# Patient Record
Sex: FEMALE | Race: WHITE | NOT HISPANIC OR LATINO | Employment: UNEMPLOYED | URBAN - METROPOLITAN AREA
[De-identification: names, ages, dates, MRNs, and addresses within clinical notes are randomized per-mention and may not be internally consistent; named-entity substitution may affect disease eponyms.]

---

## 2018-01-11 ENCOUNTER — ALLIED HEALTH/NURSE VISIT (OUTPATIENT)
Dept: FAMILY MEDICINE | Facility: OTHER | Age: 28
End: 2018-01-11
Payer: COMMERCIAL

## 2018-01-11 VITALS — WEIGHT: 179 LBS | BODY MASS INDEX: 31.71 KG/M2

## 2018-01-11 DIAGNOSIS — Z32.01 PREGNANCY TEST POSITIVE: ICD-10-CM

## 2018-01-11 DIAGNOSIS — Z32.00 POSSIBLE PREGNANCY, NOT YET CONFIRMED: Primary | ICD-10-CM

## 2018-01-11 LAB
B-HCG SERPL-ACNC: ABNORMAL IU/L (ref 0–5)
BETA HCG QUAL IFA URINE: POSITIVE

## 2018-01-11 PROCEDURE — 84703 CHORIONIC GONADOTROPIN ASSAY: CPT | Performed by: FAMILY MEDICINE

## 2018-01-11 PROCEDURE — 36415 COLL VENOUS BLD VENIPUNCTURE: CPT | Performed by: FAMILY MEDICINE

## 2018-01-11 PROCEDURE — 84702 CHORIONIC GONADOTROPIN TEST: CPT | Performed by: FAMILY MEDICINE

## 2018-01-11 NOTE — MR AVS SNAPSHOT
After Visit Summary   1/11/2018    Mary Mckeon    MRN: [id number]           Patient Information     Date Of Birth          1990        Visit Information        Provider Department      1/11/2018 4:00 PM NL RN TEAM A, [facility]        Today's Diagnoses     Possible pregnancy, not yet confirmed    -  1    Pregnancy test positive           Follow-ups after your visit        Your next 10 appointments already scheduled     Feb 08, 2018  4:00 PM CST   [facility] with ISE[facility] CN[facility]   [facility]    [address] [facility] [id number] [phone]              Who to contact     If you have questions or need follow up information about today's clinic visit or your schedule please contact [facility] directly at [phone].  Normal or non-critical lab and imaging results will be communicated to you by MyChart, letter or phone within 4 business days after the clinic has received the results. If you do not hear from us within 7 days, please contact the clinic through MyChart or phone. If you have a critical or abnormal lab result, we will notify you by phone as soon as possible.  Submit refill requests through PacerPro or call your pharmacy and they will forward the refill request to us. Please allow 3 business days for your refill to be completed.          Additional Information About Your Visit        MyChart Information     PacerPro gives you secure access to your electronic health record. If you see a primary care provider, you can also send messages to your care team and make appointments. If you have questions, please call your primary care clinic.  If you do not have a primary care provider, please call [phone] and they will assist you.        Care EveryWhere ID     This is your Care EveryWhere ID. This could be used by other organizations to access your [facility] medical records  [id number]         Your Vitals Were     Last Period Breastfeeding? BMI (Body Mass Index)             11/27/2017 (Exact Date) No 31.71 kg/m2          Blood Pressure from Last 3 Encounters:   07/22/16 114/60   06/30/16 94/60   06/03/16 114/66    Weight from Last 3 Encounters:   01/11/18 179 lb (81.2 kg)   07/22/16 207 lb (93.9 kg)   06/30/16 203 lb 12 oz (92.4 kg)              We Performed the Following     Beta HCG qual IFA urine     HCG quantitative pregnancy        Primary Care Provider Fax #    Physician No Ref-Primary [phone]       No address on file        Equal Access to Services     ANAHI DIAZ : Hadii sylvester Johnson, kinsey shultz, sarkis espino, garrick perrin . So [facility] [phone].    ATENCIÓN: Si habla español, tiene a turpin disposición servicios gratuitos de asistencia lingüística. Llame al [phone].    We comply with applicable federal civil rights laws and [place] laws. We do not discriminate on the basis of race, color, national origin, age, disability, sex, sexual orientation, or gender identity.            Thank you!     Thank you for choosing [facility]  for your care. Our goal is always to provide you with excellent care. Hearing back from our patients is one way we can continue to improve our services. Please take a few minutes to complete the written survey that you may receive in the mail after your visit with us. Thank you!             Your Updated Medication List - Protect others around you: Learn how to safely use, store and throw away your medicines at www.disposemymeds.org.          This list is accurate as of: 1/11/18  4:24 PM.  Always use your most recent med list.                   Brand Name Dispense Instructions for use Diagnosis    albuterol 108 (90 BASE) MCG/ACT Inhaler    PROAIR HFA/PROVENTIL HFA/VENTOLIN HFA    1 Inhaler    Inhale 2 puffs into the lungs every 6 hours as needed for shortness of breath / dyspnea     Intermittent asthma       cephALEXin 500 MG capsule    KEFLEX    40 capsule    Take 1 capsule (500 mg) by mouth 4 times daily    Cellulitis of abdominal wall       FLOVENT  MCG/ACT Inhaler   Generic drug:  fluticasone     3 Inhaler    INHALE 2 PUFFS INTO THE LUNGS 2 TIMES DAILY    Intermittent asthma       IRON FORMULA PO      Take 325 mg by mouth 2 times daily        norethindrone 0.35 MG per tablet    MICRONOR    84 tablet    Take 1 tablet (0.35 mg) by mouth daily    Encounter for initial prescription of contraceptive pills       PRENATAL 1 PO       Pregnancy examination or test, pregnancy unconfirmed

## 2018-01-11 NOTE — PROGRESS NOTES
Mary Mckeon is a 27 year old here today for a pregnancy test.  LMP: Patient's last menstrual period was 2017 (exact date).  Wt: 179 lbs 0 oz.    Symptoms include breast tenderness, absence of menses, nausea, and fatigue.    Mary informed of positive pregnancy test results. FABRICIO: 9/3/2018    Educational advice given: nutrition, smoking and drugs & alcohol.    Current medications reviewed: Yes    Previous pregnancy history remarkable for: none.    Plan: follow-up appointment with Kandy Lux CNM, for pre-aleyda care, take multivitamin or pre-aleyda vitamins and OB Education packet given.    She is to call back if she has any questions or concerns.  She is advised to notify a provider immediately if she experiences any severe cramping or abdominal pain or any vaginal bleeding.    Nayla Vazquez RN

## 2018-01-22 ENCOUNTER — RADIANT APPOINTMENT (OUTPATIENT)
Dept: ULTRASOUND IMAGING | Facility: OTHER | Age: 28
End: 2018-01-22
Attending: ADVANCED PRACTICE MIDWIFE
Payer: COMMERCIAL

## 2018-01-22 DIAGNOSIS — Z32.01 PREGNANCY TEST POSITIVE: ICD-10-CM

## 2018-01-22 PROCEDURE — 76801 OB US < 14 WKS SINGLE FETUS: CPT

## 2018-02-10 ENCOUNTER — NURSE TRIAGE (OUTPATIENT)
Dept: NURSING | Facility: CLINIC | Age: 28
End: 2018-02-10

## 2018-02-11 NOTE — TELEPHONE ENCOUNTER
"Pt called from [place] so unable to triage. Pt states she is 12 wks pregnant and \"I am just miscarried my baby\"  She had moderate bleeding and passed \"the entire fetus\". No severe abdominal pain. No lightheadedness. No heavy bleeding. Advised wear regular sanitary pad. If saturating pad in 2 hrs or less she should seek care at nearest hospital ED. If feeling lightheaded/dizzy or very weak or if constant abd pain, severe cramping, fever, feeling ill in general or other sx she is not sure about, seek care at nearest hospital ED. Pt voiced understanding. Advised she may call back if she has questions we can try to answer or we can try on-call. Cary Hollins RN/FNA    "

## 2018-02-20 ENCOUNTER — TELEPHONE (OUTPATIENT)
Dept: OBGYN | Facility: OTHER | Age: 28
End: 2018-02-20

## 2018-02-20 NOTE — TELEPHONE ENCOUNTER
LM for the patient to return call to the clinic to discuss the below. Will await to hear from patient. Ana Brwoning RN, BSN

## 2018-02-20 NOTE — TELEPHONE ENCOUNTER
Reason for Call:  Other question    Detailed comments: patient states she had a miscarriage a week ago Saturday and she wants to know if she needs to come in and see you? Patient has an appointment on 02/26/2018 for her 1 st ob should she keep it and see you for the miscarriage?    Phone Number Patient can be reached at: Home number on file [phone] (home)    Best Time: anytime    Can we leave a detailed message on this number? YES    Call taken on 2/20/2018 at 8:38 AM by Brooklyn Baker

## 2018-02-20 NOTE — TELEPHONE ENCOUNTER
"Mary Mckeon is a 27 year old female who calls with miscarriage.    NURSING ASSESSMENT:  Description:  Last Saturday when she was on her way to [place] started to have vaginal bleeding and when she went to the bathroom \"saw it in the toilet.\"  Believes she was around 11 weeks along when this occurred. Denies vaginal bleeding at this time, vaginal discomfort, discharge, odor, itching, abdominal pain, fevers.   Onset/duration:  Last Saturday   Precip. factors:  Newly pregnant followed by vaginal bleeding   Associated symptoms:  None at this time  Improves/worsens symptoms:  Improved   Pain scale (0-10)   0/10  LMP/preg/breast feedin/10/2018  Last exam/Treatment:  2016  Allergies: No Known Allergies    NURSING PLAN: Nursing advice to patient to keep scheduled OV RN changed  Reason for visit and type    RECOMMENDED DISPOSITION:  Home care advice   Will comply with recommendation: Yes  If further questions/concerns or if symptoms do not improve, worsen or new symptoms develop, call your PCP or [facility] as soon as possible.    NOTES:  Disposition was determined by the first positive assessment question, therefore all previous assessment questions were negative    Guideline used:  Telephone Triage Protocols for Nurses, Fifth Edition, Lizy Del Rio  Vaginal bleeding  Nursing judgment    Ana Browning, RN, BSN         "

## 2018-02-20 NOTE — TELEPHONE ENCOUNTER
LM for the patient to return call to the clinic to discuss the below. Will await to hear from patient. Ana Browning RN, BSN

## 2018-02-23 ENCOUNTER — VIRTUAL VISIT (OUTPATIENT)
Dept: FAMILY MEDICINE | Facility: OTHER | Age: 28
End: 2018-02-23

## 2018-02-23 NOTE — PROGRESS NOTES
"Date:   Clinician: Broderick Monterroso  Clinician NPI: [id number]  Patient: Mary Torres  Patient : 1990  Patient Address: [address] [place] [id number]  Patient Phone: [phone]  Visit Protocol: UTI  Patient Summary:  Mary is a 27 year old ( : 1990 ) female who initiated a Visit for a presumed bladder infection. When asked the question \"Please sign me up to receive news, health information and promotions. \", Mary responded \"No\".    Her symptoms began 2 days ago and consist of hesitation, urgency, foul smelling urine, urinary frequency, and dysuria.   Symptom Details   Urinary Frequency: Every hour    She denies loss of appetite, chills, vaginal discharge, flank pain, feeling feverish, nausea, recent antibiotic use, abdominal pain, vomiting, hematuria, and urinary incontinence. Mary has never had kidney stones. She has not been hospitalized, been a patient in a nursing home, or had a catheter in the past two weeks. She denies risk factors for sexually transmitted infections.   Mary has not had any UTIs in the past 12 months. Her current symptoms are similar to the previous UTI symptoms. She took TMP/Sulfa for her last infection and found it to be effective.   Mary does not get yeast infections when she takes antibiotics.   She denies pregnancy and denies breastfeeding. Her last period was over a month ago.   She does NOT smoke or use smokeless tobacco.  MEDICATIONS:  No current medications , ALLERGIES:  NKDA   Clinician Response:  Dear Mary,  Based on the information you have provided, you likely have a bladder infection, also called an acute urinary tract infection (UTI).   To treat your infection, I am prescribing:   Nitrofurantoin (Macrobid). Swallow one (1) tablet twice a day for 5 days. Take the tablet with food. Continue taking the tablets even if you feel better before all the medication is gone. There is no refill with this prescription.   Antibiotic " selections by the provider are based on safety and effectiveness. You may or may not be prescribed the same medication that you took for your last bladder infection.   Some people develop allergies to antibiotics. If you notice a new rash, significant swelling, or difficulty breathing, stop the medication immediately and go into a clinic for physical evaluation.   To help treat your current UTI and prevent future occurrences, remember to:     Drink 8-10, 8-ounce glasses of water daily.    Urinate after sexual intercourse.    Wipe front to back after using the bathroom.     Some women may develop a yeast infection as a side effect of taking antibiotics. If you notice symptoms of a yeast infection, [facility] can help treat that condition as well. Simply log in and complete another Visit, which will cover all of the necessary questions to determine the best treatment for you.   You should visit a clinic for a follow-up visit if your symptoms do not improve in 1-2 days or if you experience another urinary tract infection soon after completing this treatment.  If you become pregnant during this course of treatment, stop taking the medication and contact your primary care provider.   Diagnosis: Acute Uncomplicated Bladder Infection  Diagnosis ICD: N39.0  Prescription: nitrofurantoin (Macrobid) 100mg oral tablet 10 tablets, 5 days supply. Take one tablet by mouth two times a day for 5 days. Refills: 0, Refill as needed: no, Allow substitutions: yes  Pharmacy: [id number] - [phone] - [address], [place] [address]

## 2018-02-26 ENCOUNTER — PRENATAL OFFICE VISIT (OUTPATIENT)
Dept: OBGYN | Facility: OTHER | Age: 28
End: 2018-02-26
Payer: COMMERCIAL

## 2018-02-26 ENCOUNTER — MYC MEDICAL ADVICE (OUTPATIENT)
Dept: OBGYN | Facility: OTHER | Age: 28
End: 2018-02-26

## 2018-02-26 ENCOUNTER — RADIANT APPOINTMENT (OUTPATIENT)
Dept: ULTRASOUND IMAGING | Facility: CLINIC | Age: 28
End: 2018-02-26
Attending: OBSTETRICS & GYNECOLOGY
Payer: COMMERCIAL

## 2018-02-26 VITALS
WEIGHT: 182 LBS | SYSTOLIC BLOOD PRESSURE: 116 MMHG | BODY MASS INDEX: 32.24 KG/M2 | DIASTOLIC BLOOD PRESSURE: 60 MMHG | HEART RATE: 104 BPM

## 2018-02-26 DIAGNOSIS — O03.9 MISCARRIAGE: Primary | ICD-10-CM

## 2018-02-26 DIAGNOSIS — B96.89 BV (BACTERIAL VAGINOSIS): ICD-10-CM

## 2018-02-26 DIAGNOSIS — N89.8 VAGINAL DISCHARGE: ICD-10-CM

## 2018-02-26 DIAGNOSIS — O03.9 MISCARRIAGE: ICD-10-CM

## 2018-02-26 DIAGNOSIS — N76.0 BV (BACTERIAL VAGINOSIS): ICD-10-CM

## 2018-02-26 LAB
B-HCG SERPL-ACNC: ABNORMAL IU/L (ref 0–5)
SPECIMEN SOURCE: ABNORMAL
WET PREP SPEC: ABNORMAL

## 2018-02-26 PROCEDURE — 84702 CHORIONIC GONADOTROPIN TEST: CPT | Performed by: OBSTETRICS & GYNECOLOGY

## 2018-02-26 PROCEDURE — 76816 OB US FOLLOW-UP PER FETUS: CPT | Performed by: RADIOLOGY

## 2018-02-26 PROCEDURE — 36415 COLL VENOUS BLD VENIPUNCTURE: CPT | Performed by: OBSTETRICS & GYNECOLOGY

## 2018-02-26 PROCEDURE — 99214 OFFICE O/P EST MOD 30 MIN: CPT | Performed by: OBSTETRICS & GYNECOLOGY

## 2018-02-26 PROCEDURE — 87210 SMEAR WET MOUNT SALINE/INK: CPT | Performed by: OBSTETRICS & GYNECOLOGY

## 2018-02-26 RX ORDER — METRONIDAZOLE 500 MG/1
500 TABLET ORAL 2 TIMES DAILY
Qty: 14 TABLET | Refills: 0 | Status: SHIPPED | OUTPATIENT
Start: 2018-02-26 | End: 2018-03-09

## 2018-02-26 ASSESSMENT — PAIN SCALES - GENERAL: PAINLEVEL: NO PAIN (0)

## 2018-02-26 NOTE — MR AVS SNAPSHOT
After Visit Summary   2/26/2018    Mary Torres    MRN: [id number]           Patient Information     Date Of Birth          1990        Visit Information        Provider Department      2/26/2018 8:00 AM Jailyn Palacios DO [facility]        Today's Diagnoses     Miscarriage    -  1    Vaginal discharge          Care Instructions    Please call if you any questions.    [facility]  [address] [place]   [address]  [phone]        Jailyn Palacios            Follow-ups after your visit        Follow-up notes from your care team     Return in about 2 weeks (around 3/12/2018).      Future tests that were ordered for you today     Open Standing Orders        Priority Remaining Interval Expires Ordered    HCG quantitative pregnancy Routine 4/5  3/16/2018 2/26/2018          Open Future Orders        Priority Expected Expires Ordered    US OB Single Follow Up Repeat Routine  5/27/2018 2/26/2018            Who to contact     If you have questions or need follow up information about today's clinic visit or your schedule please contact [facility] directly at [phone].  Normal or non-critical lab and imaging results will be communicated to you by TextRecruithart, letter or phone within 4 business days after the clinic has received the results. If you do not hear from us within 7 days, please contact the clinic through TextRecruithart or phone. If you have a critical or abnormal lab result, we will notify you by phone as soon as possible.  Submit refill requests through Taste Guru or call your pharmacy and they will forward the refill request to us. Please allow 3 business days for your refill to be completed.          Additional Information About Your Visit        MyChart Information     Taste Guru gives you secure access to your electronic health record. If you see a primary care provider, you can also send messages to your care team and make appointments. If you  have questions, please call your primary care clinic.  If you do not have a primary care provider, please call [phone] and they will assist you.        Care EveryWhere ID     This is your Care EveryWhere ID. This could be used by other organizations to access your [facility] medical records  [id number]        Your Vitals Were     Pulse Last Period Breastfeeding? BMI (Body Mass Index)          104 11/27/2017 (Exact Date) Unknown 32.24 kg/m2         Blood Pressure from Last 3 Encounters:   02/26/18 116/60   07/22/16 114/60   06/30/16 94/60    Weight from Last 3 Encounters:   02/26/18 182 lb (82.6 kg)   01/11/18 179 lb (81.2 kg)   07/22/16 207 lb (93.9 kg)              We Performed the Following     HCG quantitative pregnancy     Wet prep        Primary Care Provider Fax #    Physician No Ref-Primary [phone]       No address on file        Equal Access to Services     ANAHI DIAZ : Hadii sylvester martinio Alex, waaxda luqadaha, qaybta kaalmada ademargaret, garrick perrin . So [facility] [phone].    ATENCIÓN: Si habla español, tiene a turpin disposición servicios gratuitos de asistencia lingüística. Tracie al [phone].    We comply with applicable federal civil rights laws and [place] laws. We do not discriminate on the basis of race, color, national origin, age, disability, sex, sexual orientation, or gender identity.            Thank you!     Thank you for choosing [facility]  for your care. Our goal is always to provide you with excellent care. Hearing back from our patients is one way we can continue to improve our services. Please take a few minutes to complete the written survey that you may receive in the mail after your visit with us. Thank you!             Your Updated Medication List - Protect others around you: Learn how to safely use, store and throw away your medicines at www.disposemymeds.org.          This list is accurate as of 2/26/18  8:33 AM.  Always  use your most recent med list.                   Brand Name Dispense Instructions for use Diagnosis    albuterol 108 (90 BASE) MCG/ACT Inhaler    PROAIR HFA/PROVENTIL HFA/VENTOLIN HFA    1 Inhaler    Inhale 2 puffs into the lungs every 6 hours as needed for shortness of breath / dyspnea    Intermittent asthma       FLOVENT  MCG/ACT Inhaler   Generic drug:  fluticasone     3 Inhaler    INHALE 2 PUFFS INTO THE LUNGS 2 TIMES DAILY    Intermittent asthma       MACROBID PO      Take by mouth 2 times daily (with meals)        PRENATAL 1 PO       Pregnancy examination or test, pregnancy unconfirmed

## 2018-02-26 NOTE — PROGRESS NOTES
"Subjective  27 year old pregnant female at 13 6/7 weeks based on 8 week ultrasound presents today complaining of a likely MAB.  Patient was flying to [place] on 2/10 and felt a gush of blood.  She went to the bathroom and the blood had soaked through her underwear and jeans.  She went to the bathroom later that day and saw a \"fetus\" in it.  She is not having anymore vaginal bleeding.  She had very minimal vaginal bleeding after passing the tissue she says.  No cramping.  No fever or chills.  Patient has had unprotected since then.  She was seen early January and had a quant done then which was 30,050.  O+.  We discussed getting a quant and ultrasound today.  Reviewed that miscarriage occurs ~ 1 in 5 pregnancy events and that there was no direct event or prevention that the patient could have avoided or performed.  Discussed that there are many etiologies for miscarriage, the most common being a genetic anomaly that occurs within the pregnancy itself.  Reviewed that risk of miscarriage for next pregnancy is not elevated by the current event.    Reviewed options of expectant management, D&C, and medical therapy (cytotec).  Reviewed risks and benefits of all options.  All questions answered.  Patient is opting for expectant management for now until the results are back.             ROS: 10 point ROS neg other than the symptoms noted above in the HPI.  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 No family hx of      Hypertension No family hx of      CEREBROVASCULAR DISEASE No family hx of      Breast Cancer No family hx of      Cancer " - colorectal No family hx of      Prostate Cancer No family hx of      Alcohol/Drug No family hx of      Allergies No family hx of      Alzheimer Disease No family hx of      Anesthesia Reaction No family hx of      Arthritis No family hx of      Blood Disease No family hx of      Cardiovascular No family hx of      Circulatory No family hx of      Congenital Anomalies No family hx of      Connective Tissue Disorder No family hx of      Depression No family hx of      Endocrine Disease No family hx of      Eye Disorder No family hx of      Genetic Disorder No family hx of      GASTROINTESTINAL DISEASE No family hx of      Genitourinary Problems No family hx of      Gynecology No family hx of      HEART DISEASE No family hx of      Lipids No family hx of      Musculoskeletal Disorder No family hx of      Neurologic Disorder No family hx of      Obesity No family hx of      OSTEOPOROSIS No family hx of      Psychotic Disorder No family hx of      Respiratory No family hx of      Family History Negative No family hx of      Hearing Loss No family hx of      Social History   Substance Use Topics     Smoking status: Never Smoker     Smokeless tobacco: Never Used     Alcohol use No         Objective  Vitals: /60  Pulse 104  Wt 182 lb (82.6 kg)  LMP 11/27/2017 (Exact Date)  Breastfeeding? Unknown  BMI 32.24 kg/m2  BMI= Body mass index is 32.24 kg/(m^2).    General appearance=well developed, well-nourished female  Psych=mood is stable  PELVIC:    External genitalia: normal without lesions or masses  Urethral meatus: no lesions or prolapse noted, normal size  Urethra: no masses, non tender  Bladder: non tender, no fullness  Vagina: normal mucosa and rugae, moderate amount of clear discharge, no old blood or active bleeding, wet prep obatained  Cervix: normal without lesion, no cervical motion tenderness, healthy, multiparous  Uterus: small, mobile, nontender.  Adnexa: non tender, without masses  Rectal:  deffered  Ext=no clubbing or cyanosis, no swelling      Assessment  1.)  Likely MAB  2.)  Vaginal discharge       Plan  1.)  Quant and ultrasound  2.)  Wet prep      25 minutes was spent face to face with the patient today discussing her history, diagnosis, and follow-up plan as noted above.  Over 50% of the visit was spent in counseling and coordination of care.        Nursing notes read and reviewed    Jailyn Palacios

## 2018-02-26 NOTE — PATIENT INSTRUCTIONS
Please call if you any questions.    [address]   [address]  [phone]        Jailyn Palacios

## 2018-02-26 NOTE — TELEPHONE ENCOUNTER
I called and spoke to patient.  All questions answered and patient verbalizes understanding.    Jailyn Palacios

## 2018-02-26 NOTE — NURSING NOTE
"Chief Complaint   Patient presents with     Miscarriage       Initial /60  Pulse 104  Wt 182 lb (82.6 kg)  LMP 11/27/2017 (Exact Date)  Breastfeeding? Unknown  BMI 32.24 kg/m2 Estimated body mass index is 32.24 kg/(m^2) as calculated from the following:    Height as of 11/24/15: 5' 3\" (1.6 m).    Weight as of this encounter: 182 lb (82.6 kg).  Medication Reconciliation: complete  "

## 2018-03-06 ENCOUNTER — MYC MEDICAL ADVICE (OUTPATIENT)
Dept: OBGYN | Facility: OTHER | Age: 28
End: 2018-03-06

## 2018-03-07 NOTE — TELEPHONE ENCOUNTER
Routing to  OB/GYN Provider pool to review and advise if willing to do a phone visit with patient for possible yeast infection after antibiotic use (prescribed by GG) and is pregnant. Please review and advise. Ana Browning RN, BSN

## 2018-03-07 NOTE — TELEPHONE ENCOUNTER
I believe Dr. Palacios may be only one of the OB providers that does phone visits. I'm happy to do E-visit, but as patient stated it may not let her due to pregnancy. Could then either do office visit for wet prep, lab visit for self collect wet prep-I would be ok putting in orders for her or could do 7 day OTC Monistat. As she is pregnant, this is likely what would be prescribed over Diflucan anyway. Bhargavi WEISS CNP

## 2018-03-08 NOTE — TELEPHONE ENCOUNTER
Patient needs to be seen in the office for a wet prep.  I can work her into my schedule.  Thanks.    Jailyn Palacios

## 2018-03-09 ENCOUNTER — PRENATAL OFFICE VISIT (OUTPATIENT)
Dept: OBGYN | Facility: CLINIC | Age: 28
End: 2018-03-09
Payer: COMMERCIAL

## 2018-03-09 VITALS
BODY MASS INDEX: 32.06 KG/M2 | HEART RATE: 82 BPM | WEIGHT: 181 LBS | SYSTOLIC BLOOD PRESSURE: 116 MMHG | DIASTOLIC BLOOD PRESSURE: 71 MMHG

## 2018-03-09 DIAGNOSIS — Z98.891 H/O: C-SECTION: Chronic | ICD-10-CM

## 2018-03-09 DIAGNOSIS — Z23 NEED FOR TDAP VACCINATION: ICD-10-CM

## 2018-03-09 DIAGNOSIS — Z23 NEED FOR PROPHYLACTIC VACCINATION AND INOCULATION AGAINST INFLUENZA: ICD-10-CM

## 2018-03-09 DIAGNOSIS — J45.20 MILD INTERMITTENT ASTHMA, UNSPECIFIED WHETHER COMPLICATED: ICD-10-CM

## 2018-03-09 DIAGNOSIS — Z34.92 NORMAL PREGNANCY IN SECOND TRIMESTER: Primary | ICD-10-CM

## 2018-03-09 LAB
ABO + RH BLD: NORMAL
ABO + RH BLD: NORMAL
ALBUMIN UR-MCNC: NEGATIVE MG/DL
APPEARANCE UR: CLEAR
BASOPHILS # BLD AUTO: 0 10E9/L (ref 0–0.2)
BASOPHILS NFR BLD AUTO: 0.1 %
BILIRUB UR QL STRIP: NEGATIVE
BLD GP AB SCN SERPL QL: NORMAL
BLOOD BANK CMNT PATIENT-IMP: NORMAL
COLOR UR AUTO: YELLOW
DIFFERENTIAL METHOD BLD: NORMAL
EOSINOPHIL # BLD AUTO: 0.1 10E9/L (ref 0–0.7)
EOSINOPHIL NFR BLD AUTO: 1.8 %
ERYTHROCYTE [DISTWIDTH] IN BLOOD BY AUTOMATED COUNT: 13.1 % (ref 10–15)
GLUCOSE UR STRIP-MCNC: NEGATIVE MG/DL
HBV SURFACE AG SERPL QL IA: NONREACTIVE
HCT VFR BLD AUTO: 35.4 % (ref 35–47)
HGB BLD-MCNC: 12.1 G/DL (ref 11.7–15.7)
HGB UR QL STRIP: ABNORMAL
HIV 1+2 AB+HIV1 P24 AG SERPL QL IA: NONREACTIVE
KETONES UR STRIP-MCNC: NEGATIVE MG/DL
LEUKOCYTE ESTERASE UR QL STRIP: NEGATIVE
LYMPHOCYTES # BLD AUTO: 1.9 10E9/L (ref 0.8–5.3)
LYMPHOCYTES NFR BLD AUTO: 24.5 %
MCH RBC QN AUTO: 29.9 PG (ref 26.5–33)
MCHC RBC AUTO-ENTMCNC: 34.2 G/DL (ref 31.5–36.5)
MCV RBC AUTO: 87 FL (ref 78–100)
MONOCYTES # BLD AUTO: 0.6 10E9/L (ref 0–1.3)
MONOCYTES NFR BLD AUTO: 7.2 %
NEUTROPHILS # BLD AUTO: 5.2 10E9/L (ref 1.6–8.3)
NEUTROPHILS NFR BLD AUTO: 66.4 %
NITRATE UR QL: NEGATIVE
NON-SQ EPI CELLS #/AREA URNS LPF: ABNORMAL /LPF
PH UR STRIP: 8.5 PH (ref 5–7)
PLATELET # BLD AUTO: 241 10E9/L (ref 150–450)
RBC # BLD AUTO: 4.05 10E12/L (ref 3.8–5.2)
RBC #/AREA URNS AUTO: ABNORMAL /HPF
SOURCE: ABNORMAL
SP GR UR STRIP: 1.01 (ref 1–1.03)
SPECIMEN EXP DATE BLD: NORMAL
SPECIMEN SOURCE: NORMAL
UROBILINOGEN UR STRIP-ACNC: 0.2 EU/DL (ref 0.2–1)
WBC # BLD AUTO: 7.8 10E9/L (ref 4–11)
WBC #/AREA URNS AUTO: ABNORMAL /HPF
WET PREP SPEC: NORMAL

## 2018-03-09 PROCEDURE — 87591 N.GONORRHOEAE DNA AMP PROB: CPT | Performed by: OBSTETRICS & GYNECOLOGY

## 2018-03-09 PROCEDURE — 90471 IMMUNIZATION ADMIN: CPT | Performed by: OBSTETRICS & GYNECOLOGY

## 2018-03-09 PROCEDURE — 87491 CHLMYD TRACH DNA AMP PROBE: CPT | Performed by: OBSTETRICS & GYNECOLOGY

## 2018-03-09 PROCEDURE — 99207 ZZC FIRST OB VISIT: CPT | Performed by: OBSTETRICS & GYNECOLOGY

## 2018-03-09 PROCEDURE — 87389 HIV-1 AG W/HIV-1&-2 AB AG IA: CPT | Performed by: OBSTETRICS & GYNECOLOGY

## 2018-03-09 PROCEDURE — 86900 BLOOD TYPING SEROLOGIC ABO: CPT | Performed by: OBSTETRICS & GYNECOLOGY

## 2018-03-09 PROCEDURE — 87210 SMEAR WET MOUNT SALINE/INK: CPT | Performed by: OBSTETRICS & GYNECOLOGY

## 2018-03-09 PROCEDURE — 86901 BLOOD TYPING SEROLOGIC RH(D): CPT | Performed by: OBSTETRICS & GYNECOLOGY

## 2018-03-09 PROCEDURE — 85025 COMPLETE CBC W/AUTO DIFF WBC: CPT | Performed by: OBSTETRICS & GYNECOLOGY

## 2018-03-09 PROCEDURE — 36415 COLL VENOUS BLD VENIPUNCTURE: CPT | Performed by: OBSTETRICS & GYNECOLOGY

## 2018-03-09 PROCEDURE — 86780 TREPONEMA PALLIDUM: CPT | Performed by: OBSTETRICS & GYNECOLOGY

## 2018-03-09 PROCEDURE — 86762 RUBELLA ANTIBODY: CPT | Performed by: OBSTETRICS & GYNECOLOGY

## 2018-03-09 PROCEDURE — 81001 URINALYSIS AUTO W/SCOPE: CPT | Performed by: OBSTETRICS & GYNECOLOGY

## 2018-03-09 PROCEDURE — 90686 IIV4 VACC NO PRSV 0.5 ML IM: CPT | Performed by: OBSTETRICS & GYNECOLOGY

## 2018-03-09 PROCEDURE — 87340 HEPATITIS B SURFACE AG IA: CPT | Performed by: OBSTETRICS & GYNECOLOGY

## 2018-03-09 PROCEDURE — 86850 RBC ANTIBODY SCREEN: CPT | Performed by: OBSTETRICS & GYNECOLOGY

## 2018-03-09 ASSESSMENT — PAIN SCALES - GENERAL: PAINLEVEL: NO PAIN (0)

## 2018-03-09 NOTE — MR AVS SNAPSHOT
After Visit Summary   3/9/2018    Mary Torres    MRN: [id number]           Patient Information     Date Of Birth          1990        Visit Information        Provider Department      3/9/2018 8:15 AM Jailyn Palacios,  [facility], [place]        Today's Diagnoses     Normal pregnancy in second trimester    -  1    H/O:         Need for Tdap vaccination        Mild intermittent asthma, unspecified whether complicated        Need for prophylactic vaccination and inoculation against influenza          Care Instructions    Prenatal Care  What is prenatal care?   Prenatal care is the care you receive when you are pregnant. It includes care given by your healthcare provider, support from your family, and an extra focus on giving yourself the care you need during this special time. Prenatal care improves your chances for a healthy pregnancy and healthy baby.   When should I see my healthcare provider?   Good care during pregnancy includes regularly scheduled prenatal exams.   If you are not yet pregnant but planning to get pregnant in the next few months, see your provider. Your provider may do some tests and talk about things you can do to have a healthy pregnancy and healthy baby.   You should schedule your first prenatal visit with your provider as soon as you think or know you are pregnant. Depending on your health and health history, your provider will probably schedule visits at least once a month for the first 6 months. During the 7th and 8th months you will see your provider every 2 weeks. During the last month you will probably see your provider once a week until you deliver your baby. If your pregnancy is high risk, your provider will probably want to see you more often. In some cases your provider may refer you to a medical specialist for more help with special needs, such as diabetes.   At each visit your healthcare provider will check to make sure that you and the  baby are healthy. Regular visits can help you and your provider prevent possible problems. They can also help your provider find and treat any problems early. In addition to meeting your medical needs, your provider advise you about caring for yourself. You will talk about how to have a healthy diet and get plenty of exercise and rest. Your provider can also help you deal with the emotional changes that can happen during pregnancy.   What will happen at the first prenatal visit?   At your first visit, your provider will ask about your personal medical history. He or she will also ask about the baby's father and your family health history. This information can help give your provider an idea of any problems you might have during your pregnancy. You will have a physical exam, including checks of your height, weight, and blood pressure and a pelvic exam. You will have a Pap test, urine tests, blood tests, and cultures of the cervix and vagina to check for infection.   Your provider will calculate your due date and the age of your baby. If your periods were regular before you got pregnant, and you are sure of the day when your last period started, your due date will be estimated to be 40 weeks from that day.   Your provider will talk to you about how to stay healthy during your pregnancy.   What will happen at other prenatal visits?   Your provider will check how you are doing and how the baby is developing. He or she will discuss how you are feeling, ask if you have any problems, and answer your questions.   During each prenatal visit your provider will:   weigh you   take your blood pressure   check your urine for sugar, protein, or bacteria   check your face, hands, ankles, and feet for swelling   listen to the baby's heartbeat   measure the size of the uterus to check the baby's growth.   At different times during the pregnancy, other exams and tests may be done. Some are routine and others are done only when a  problem is suspected or you have a risk factor for a problem. Examples of other tests you might have are:   tests to check for genetic problems and some birth defects, such as:   chorionic villus sampling of cells from the placenta   amniocentesis to test the fluid around the baby   blood tests called triple or quad screens   ultrasound scans to check the baby's growth, development, and health and to look at your uterus, the amniotic sac, and the placenta   blood tests to check for diabetes   electronic monitoring to check the health of the baby.   How can I take care of myself during my pregnancy?   Here are some things you can do to take good care of yourself during your pregnancy and prepare for the birth of your child:   Keep all appointments with your healthcare provider. Use these visits to discuss your pregnancy concerns or problems. Write down questions before each visit so that you will not forget about things you want to talk about.   Eat healthy meals that include whole grains, fresh fruits and vegetables, and calcium-rich foods, such as milk, cheese, and yogurt. Choose foods low in saturated fat. Do not eat uncooked or undercooked meats or fish.   Avoid certain fish with high levels of mercury. These fish include shark, kyle mackerel, swordfish, and tilefish. Do not eat more than 12 ounces of fish per week, or no more than 6 ounces of tuna fish per week. Because albacore tuna fish is also high in mercury, choose light tuna.   Drink plenty of water each day.   Take vitamins, other supplements, and medicines as recommended by your provider.   Unless your healthcare provider tells you not to, try to be physically active for at least 30 minutes a day, most days of the week. If you are pressed for time, you might find it easier to exercise 10 minutes at a time, 3 times a day. Consider taking a prenatal exercise class.   Do not smoke, do not drink alcohol, and do not take illegal drugs.   Talk to your  healthcare provider before you take any medicine, including nonprescription and herbal medicines. Some medicines are not safe during pregnancy.   Avoid hot tubs or saunas.   If you have cats in your home, do not empty the cat litter while you are pregnant. It may contain a parasite that causes an infection called toxoplasmosis, which can cause birth defects. Also, use gloves when you work in garden areas used by cats.   Stay away from toxic chemicals like insecticides, solvents (such as some  or paint thinners), lead, and mercury. Check labels on household products. Most dangerous products have pregnancy warnings on their labels. Ask your healthcare provider about products if you are unsure.   Relax by taking breaks from work or chores.   Help reduce stress by sharing your feelings with others.   Report any violence or other types of abuse in your home.   Learn more about pregnancy, labor, and delivery. Read books, watch videos, go to a childbirth class, and talk with experienced moms.   Plan for the lifestyle changes a new baby will bring. Prepare for possible changes in your budget, work situation, daily schedule, and relationships with family and friends.   Talk to your provider about the pros and cons of breast-feeding.   Before and during your pregnancy, try to do everything you can to keep yourself and your baby healthy during your pregnancy.     Published by [employer].  This content is reviewed periodically and is subject to change as new health information becomes available. The information is intended to inform and educate and is not a replacement for medical evaluation, advice, diagnosis or treatment by a healthcare professional.   Developed by [employer].   ? 2010 [employer] and/or its affiliates. All Rights Reserved.   Copyright   [employer] 2011                Follow-ups after your visit        Follow-up notes from your care team     Return in about 4 weeks (around 4/6/2018).       Who to contact     If you have questions or need follow up information about today's clinic visit or your schedule please contact [facility] directly at [phone].  Normal or non-critical lab and imaging results will be communicated to you by MyChart, letter or phone within 4 business days after the clinic has received the results. If you do not hear from us within 7 days, please contact the clinic through [employer] or phone. If you have a critical or abnormal lab result, we will notify you by phone as soon as possible.  Submit refill requests through [employer] or call your pharmacy and they will forward the refill request to us. Please allow 3 business days for your refill to be completed.          Additional Information About Your Visit        [employer] Information     [employer] gives you secure access to your electronic health record. If you see a primary care provider, you can also send messages to your care team and make appointments. If you have questions, please call your primary care clinic.  If you do not have a primary care provider, please call [phone] and they will assist you.        Care EveryWhere ID     This is your Care EveryWhere ID. This could be used by other organizations to access your [facility] medical records  [id number]        Your Vitals Were     Pulse Last Period BMI (Body Mass Index)             82 11/27/2017 (Exact Date) 32.06 kg/m2          Blood Pressure from Last 3 Encounters:   03/09/18 116/71   02/26/18 116/60   07/22/16 114/60    Weight from Last 3 Encounters:   03/09/18 181 lb (82.1 kg)   02/26/18 182 lb (82.6 kg)   01/11/18 179 lb (81.2 kg)              We Performed the Following     ABO/Rh type and screen     Anti Treponema     CBC with platelets differential     Chlamydia trachomatis PCR     FLU VAC, SPLIT VIRUS IM > 3 YO (QUADRIVALENT) [65100]     Hepatitis B surface antigen     HIV Antigen Antibody Combo     Neisseria gonorrhoeae PCR     Rubella Antibody IgG  Quantitative     UA with Microscopic reflex to Culture     Vaccine Administration, Initial [93305]     Wet prep        Primary Care Provider Fax #    Physician No Ref-Primary [phone]       No address on file        Equal Access to Services     ANAHI DIAZ : Hadii aad ku hadbrendakrystle Johnson, kinsey shultz, sarkis espino, garrick cornejo marychuy hickman. So [facility] [phone].    ATENCIÓN: Si habla español, tiene a turpin disposición servicios gratuitos de asistencia lingüística. Llame al [phone].    We comply with applicable federal civil rights laws and [place] laws. We do not discriminate on the basis of race, color, national origin, age, disability, sex, sexual orientation, or gender identity.            Thank you!     Thank you for choosing [facility]  for your care. Our goal is always to provide you with excellent care. Hearing back from our patients is one way we can continue to improve our services. Please take a few minutes to complete the written survey that you may receive in the mail after your visit with us. Thank you!             Your Updated Medication List - Protect others around you: Learn how to safely use, store and throw away your medicines at www.disposemymeds.org.          This list is accurate as of 3/9/18 11:59 PM.  Always use your most recent med list.                   Brand Name Dispense Instructions for use Diagnosis    albuterol 108 (90 BASE) MCG/ACT Inhaler    PROAIR HFA/PROVENTIL HFA/VENTOLIN HFA    1 Inhaler    Inhale 2 puffs into the lungs every 6 hours as needed for shortness of breath / dyspnea    Intermittent asthma       FLOVENT  MCG/ACT Inhaler   Generic drug:  fluticasone     3 Inhaler    INHALE 2 PUFFS INTO THE LUNGS 2 TIMES DAILY    Intermittent asthma       PRENATAL 1 PO       Pregnancy examination or test, pregnancy unconfirmed

## 2018-03-09 NOTE — PROGRESS NOTES
HPI:    Mary is a 27 year old yo  at 14 4/7 weeks by 8 week ultrasound who presents today for her initial OB visit. Last pap smear was in 2016 and was normal.    Issues: Vaginal bleeding early in pregnancy.  She passed tissue and thought miscarriage.  Patient has a white vaginal discharge with itching. No odor.  No fever or chills.     Past OB Hx: 2 c sections     Father of baby: No health issues       Past Medical History:   Diagnosis Date     Asthma, intermittent              Past Surgical History:   Procedure Laterality Date      SECTION  13    Failure to descend     ENT SURGERY      wisdom teeth        Family History   Problem Relation Age of Onset     Asthma Mother      DIABETES Father      Thyroid Disease Father      CANCER Paternal Grandmother      lung cancer     Asthma Maternal Grandmother      Asthma Sister      C.A.D. No family hx of      Hypertension No family hx of      CEREBROVASCULAR DISEASE No family hx of      Breast Cancer No family hx of      Cancer - colorectal No family hx of      Prostate Cancer No family hx of      Alcohol/Drug No family hx of      Allergies No family hx of      Alzheimer Disease No family hx of      Anesthesia Reaction No family hx of      Arthritis No family hx of      Blood Disease No family hx of      Cardiovascular No family hx of      Circulatory No family hx of      Congenital Anomalies No family hx of      Connective Tissue Disorder No family hx of      Depression No family hx of      Endocrine Disease No family hx of      Eye Disorder No family hx of      Genetic Disorder No family hx of      GASTROINTESTINAL DISEASE No family hx of      Genitourinary Problems No family hx of      Gynecology No family hx of      HEART DISEASE No family hx of      Lipids No family hx of      Musculoskeletal Disorder No family hx of      Neurologic Disorder No family hx of      Obesity No family hx of      OSTEOPOROSIS No family hx of      Psychotic Disorder No  family hx of      Respiratory No family hx of      Family History Negative No family hx of      Hearing Loss No family hx of         Social History     Social History     Marital status:      Spouse name: N/A     Number of children: 1     Years of education: N/A     Occupational History      [employer]     MA-  @ [employer]     Social History Main Topics     Smoking status: Never Smoker     Smokeless tobacco: Never Used     Alcohol use No     Drug use: No     Sexual activity: Yes     Partners: Male     Birth control/ protection: None      Comment: none     Other Topics Concern      Service No     Blood Transfusions No     Caffeine Concern No     Occupational Exposure No     Hobby Hazards No     Sleep Concern No     Stress Concern No     Weight Concern No     Special Diet No     Back Care No     Exercise Yes     Bike Helmet No     don't ride bike     Seat Belt Yes     Self-Exams Yes     Social History Narrative    Lives with spouse and daughter, in own home. One dog. No cats.          Current Outpatient Prescriptions:      Prenatal MV-Min-Fe Fum-FA-DHA (PRENATAL 1 PO), , Disp: , Rfl:      FLOVENT  MCG/ACT inhaler, INHALE 2 PUFFS INTO THE LUNGS 2 TIMES DAILY, Disp: 3 Inhaler, Rfl: 1     albuterol (PROAIR HFA, PROVENTIL HFA, VENTOLIN HFA) 108 (90 BASE) MCG/ACT inhaler, Inhale 2 puffs into the lungs every 6 hours as needed for shortness of breath / dyspnea, Disp: 1 Inhaler, Rfl: 3      Objective:  Physical Exam:   Breast:  Benign exam, no masses palpated.  No skin changes, no axillary lymphadenopathy, no nipple discharge.  Axilla feel completely normal, no lymph node enlargement and non-tender.  Heart=RRR, no murmurs  Thyroid=normal, no masses, no TTP  Lungs=Clear to ascultation bilateral, non-labored breathing  Abd=soft, Nontender/nondistended, +bowel sounds x4  PELVIC:    External genitalia: normal without lesion  Vagina: normal mucosa and rugae, white discharge, wet prep obtained  Cervix:  normal without lesion, healthy, nulliparous, GC and Chlamydia obtained  Uterus: small, mobile, nontender.  Adnexa: non tender, without masses  Rectal: deffered  Ext=no clubbing or cyanosis  UILi=994's    Assessment:   1.  IUP at 14 4/7 weeks here for her initial OB visit    Plan:    1.  PNV  2.  NOB Labs and flu vaccine  3.  Discussed routine prenatal care, quad screen, GCT, anatomy scan at ~19 weeks, frequency of visits.  4.  Discussed first trimester screen and she is past this  5.  [place] [facility] [place].  RTC 4 weeks.  7.  History of 2 c sections  8.  Vaginal discharge=wet prep  9.  Asthma=stable      Discussed physician coverage, tertiary support, diet, exercise, weight gain, schedule of visits, routine and indicated ultrasounds, and childbirth education.    Options for  testing for chromosomal and birth defects were discussed with the patient. Diagnostic tests include CVS and Amniocentesis. We discussed that these tests are definitive but invasive and do carry a risk of fetal loss.   Screening tests include nuchal translucency/blood marker testing in the first trimester and quad screening in the second trimester. We discussed that these are screening tests and not diagnostic tests and that false positives and negatives are a distinct possibility.     25 minutes was spent face to face with the patient today discussing her history, diagnosis, and follow-up plan as noted above.  Over 50% of the visit was spent in counseling and coordination of care.    Total Visit Time: 30 minutes      Jailyn Palacios

## 2018-03-09 NOTE — NURSING NOTE
"Chief Complaint   Patient presents with     Vaginal Problem     wet prep       Initial /71  Pulse 82  Wt 181 lb (82.1 kg)  BMI 32.06 kg/m2 Estimated body mass index is 32.06 kg/(m^2) as calculated from the following:    Height as of 11/24/15: 5' 3\" (1.6 m).    Weight as of this encounter: 181 lb (82.1 kg).  Medication Reconciliation: complete  "

## 2018-03-09 NOTE — PATIENT INSTRUCTIONS
Prenatal Care  What is prenatal care?   Prenatal care is the care you receive when you are pregnant. It includes care given by your healthcare provider, support from your family, and an extra focus on giving yourself the care you need during this special time. Prenatal care improves your chances for a healthy pregnancy and healthy baby.   When should I see my healthcare provider?   Good care during pregnancy includes regularly scheduled prenatal exams.   If you are not yet pregnant but planning to get pregnant in the next few months, see your provider. Your provider may do some tests and talk about things you can do to have a healthy pregnancy and healthy baby.   You should schedule your first prenatal visit with your provider as soon as you think or know you are pregnant. Depending on your health and health history, your provider will probably schedule visits at least once a month for the first 6 months. During the 7th and 8th months you will see your provider every 2 weeks. During the last month you will probably see your provider once a week until you deliver your baby. If your pregnancy is high risk, your provider will probably want to see you more often. In some cases your provider may refer you to a medical specialist for more help with special needs, such as diabetes.   At each visit your healthcare provider will check to make sure that you and the baby are healthy. Regular visits can help you and your provider prevent possible problems. They can also help your provider find and treat any problems early. In addition to meeting your medical needs, your provider advise you about caring for yourself. You will talk about how to have a healthy diet and get plenty of exercise and rest. Your provider can also help you deal with the emotional changes that can happen during pregnancy.   What will happen at the first prenatal visit?   At your first visit, your provider will ask about your personal medical history. He  or she will also ask about the baby's father and your family health history. This information can help give your provider an idea of any problems you might have during your pregnancy. You will have a physical exam, including checks of your height, weight, and blood pressure and a pelvic exam. You will have a Pap test, urine tests, blood tests, and cultures of the cervix and vagina to check for infection.   Your provider will calculate your due date and the age of your baby. If your periods were regular before you got pregnant, and you are sure of the day when your last period started, your due date will be estimated to be 40 weeks from that day.   Your provider will talk to you about how to stay healthy during your pregnancy.   What will happen at other prenatal visits?   Your provider will check how you are doing and how the baby is developing. He or she will discuss how you are feeling, ask if you have any problems, and answer your questions.   During each prenatal visit your provider will:   weigh you   take your blood pressure   check your urine for sugar, protein, or bacteria   check your face, hands, ankles, and feet for swelling   listen to the baby's heartbeat   measure the size of the uterus to check the baby's growth.   At different times during the pregnancy, other exams and tests may be done. Some are routine and others are done only when a problem is suspected or you have a risk factor for a problem. Examples of other tests you might have are:   tests to check for genetic problems and some birth defects, such as:   chorionic villus sampling of cells from the placenta   amniocentesis to test the fluid around the baby   blood tests called triple or quad screens   ultrasound scans to check the baby's growth, development, and health and to look at your uterus, the amniotic sac, and the placenta   blood tests to check for diabetes   electronic monitoring to check the health of the baby.   How can I take care  of myself during my pregnancy?   Here are some things you can do to take good care of yourself during your pregnancy and prepare for the birth of your child:   Keep all appointments with your healthcare provider. Use these visits to discuss your pregnancy concerns or problems. Write down questions before each visit so that you will not forget about things you want to talk about.   Eat healthy meals that include whole grains, fresh fruits and vegetables, and calcium-rich foods, such as milk, cheese, and yogurt. Choose foods low in saturated fat. Do not eat uncooked or undercooked meats or fish.   Avoid certain fish with high levels of mercury. These fish include shark, kyle mackerel, swordfish, and tilefish. Do not eat more than 12 ounces of fish per week, or no more than 6 ounces of tuna fish per week. Because albacore tuna fish is also high in mercury, choose light tuna.   Drink plenty of water each day.   Take vitamins, other supplements, and medicines as recommended by your provider.   Unless your healthcare provider tells you not to, try to be physically active for at least 30 minutes a day, most days of the week. If you are pressed for time, you might find it easier to exercise 10 minutes at a time, 3 times a day. Consider taking a prenatal exercise class.   Do not smoke, do not drink alcohol, and do not take illegal drugs.   Talk to your healthcare provider before you take any medicine, including nonprescription and herbal medicines. Some medicines are not safe during pregnancy.   Avoid hot tubs or saunas.   If you have cats in your home, do not empty the cat litter while you are pregnant. It may contain a parasite that causes an infection called toxoplasmosis, which can cause birth defects. Also, use gloves when you work in garden areas used by cats.   Stay away from toxic chemicals like insecticides, solvents (such as some  or paint thinners), lead, and mercury. Check labels on household products.  Most dangerous products have pregnancy warnings on their labels. Ask your healthcare provider about products if you are unsure.   Relax by taking breaks from work or chores.   Help reduce stress by sharing your feelings with others.   Report any violence or other types of abuse in your home.   Learn more about pregnancy, labor, and delivery. Read books, watch videos, go to a childbirth class, and talk with experienced moms.   Plan for the lifestyle changes a new baby will bring. Prepare for possible changes in your budget, work situation, daily schedule, and relationships with family and friends.   Talk to your provider about the pros and cons of breast-feeding.   Before and during your pregnancy, try to do everything you can to keep yourself and your baby healthy during your pregnancy.     Published by [employer].  This content is reviewed periodically and is subject to change as new health information becomes available. The information is intended to inform and educate and is not a replacement for medical evaluation, advice, diagnosis or treatment by a healthcare professional.   Developed by [employer].   ? 2010 [employer] and/or its affiliates. All Rights Reserved.   Copyright   [employer] 2011

## 2018-03-09 NOTE — PROGRESS NOTES
Injectable Influenza Immunization Documentation    1.  Is the person to be vaccinated sick today?   No    2. Does the person to be vaccinated have an allergy to a component   of the vaccine?   No  Egg Allergy Algorithm Link    3. Has the person to be vaccinated ever had a serious reaction   to influenza vaccine in the past?   No    4. Has the person to be vaccinated ever had Guillain-Barré syndrome?   No    Form completed by [facility]           Injectable Influenza Immunization Documentation    1.  Is the person to be vaccinated sick today?   No    2. Does the person to be vaccinated have an allergy to a component   of the vaccine?   No  Egg Allergy Algorithm Link    3. Has the person to be vaccinated ever had a serious reaction   to influenza vaccine in the past?   No    4. Has the person to be vaccinated ever had Guillain-Barré syndrome?   No    Form completed by [facility]

## 2018-03-10 LAB — T PALLIDUM IGG+IGM SER QL: NEGATIVE

## 2018-03-11 LAB
C TRACH DNA SPEC QL NAA+PROBE: NEGATIVE
N GONORRHOEA DNA SPEC QL NAA+PROBE: NEGATIVE
RUBV IGG SERPL IA-ACNC: 15 IU/ML
SPECIMEN SOURCE: NORMAL
SPECIMEN SOURCE: NORMAL

## 2018-03-14 ENCOUNTER — MYC MEDICAL ADVICE (OUTPATIENT)
Dept: OBGYN | Facility: OTHER | Age: 28
End: 2018-03-14

## 2018-03-14 NOTE — TELEPHONE ENCOUNTER
GG please review and advise if able to place 20 week US for patient to complete prior to next OV at 21 weeks.   Next 5 appointments (look out 90 days)     Apr 20, 2018  3:45 PM CDT   ESTABL[facility] PRENATAL with Jail[facility]acios DO   [facility]    [address] [id number]   [phone]              Ana Browning, RN, BSN

## 2018-03-15 DIAGNOSIS — Z34.92 NORMAL PREGNANCY IN SECOND TRIMESTER: Primary | ICD-10-CM

## 2018-04-13 ENCOUNTER — RADIANT APPOINTMENT (OUTPATIENT)
Dept: ULTRASOUND IMAGING | Facility: OTHER | Age: 28
End: 2018-04-13
Attending: OBSTETRICS & GYNECOLOGY
Payer: COMMERCIAL

## 2018-04-13 DIAGNOSIS — Z34.92 NORMAL PREGNANCY IN SECOND TRIMESTER: ICD-10-CM

## 2018-04-13 PROCEDURE — 76805 OB US >/= 14 WKS SNGL FETUS: CPT

## 2018-04-20 ENCOUNTER — PRENATAL OFFICE VISIT (OUTPATIENT)
Dept: OBGYN | Facility: OTHER | Age: 28
End: 2018-04-20
Payer: COMMERCIAL

## 2018-04-20 VITALS
DIASTOLIC BLOOD PRESSURE: 60 MMHG | WEIGHT: 192 LBS | SYSTOLIC BLOOD PRESSURE: 122 MMHG | BODY MASS INDEX: 34.01 KG/M2 | HEART RATE: 80 BPM

## 2018-04-20 DIAGNOSIS — J45.20 MILD INTERMITTENT ASTHMA, UNSPECIFIED WHETHER COMPLICATED: ICD-10-CM

## 2018-04-20 DIAGNOSIS — Z34.92 NORMAL PREGNANCY IN SECOND TRIMESTER: Primary | ICD-10-CM

## 2018-04-20 DIAGNOSIS — Z98.891 H/O: C-SECTION: Chronic | ICD-10-CM

## 2018-04-20 PROCEDURE — 99207 ZZC PRENATAL VISIT: CPT | Performed by: OBSTETRICS & GYNECOLOGY

## 2018-04-20 ASSESSMENT — PAIN SCALES - GENERAL: PAINLEVEL: NO PAIN (0)

## 2018-04-20 NOTE — PROGRESS NOTES
28 year old  at 20w4d weeks presents to the clinic for a routine prenatal visit.  No concerns.  No leakage of fluid, vaginal bleeding, or contractions   Good fetal movement.  FHTs: 150's  Fundal height:  s=d  Normal anatomy ultrasound but questionable marginal cord insertion.  Level II ultrasound with MFM ordered.  Asthma=stable   RTC 4 weeks    Jailyn Palacios

## 2018-04-20 NOTE — MR AVS SNAPSHOT
After Visit Summary   2018    Mary Torres    MRN: [id number]           Patient Information     Date Of Birth          1990        Visit Information        Provider Department      2018 3:45 PM Jailyn Palacios DO [facility]        Today's Diagnoses     Normal pregnancy in second trimester    -  1    H/O:         Mild intermittent asthma, unspecified whether complicated           Follow-ups after your visit        Follow-up notes from your care team     Return in about 4 weeks (around 2018).      Future tests that were ordered for you today     Open Future Orders        Priority Expected Expires Ordered    Glucose tolerance gest screen 1 hour Routine  2018    OB hemoglobin Routine  2018            Who to contact     If you have questions or need follow up information about today's clinic visit or your schedule please contact [facility] directly at [phone].  Normal or non-critical lab and imaging results will be communicated to you by [email or web address], letter or phone within 4 business days after the clinic has received the results. If you do not hear from us within 7 days, please contact the clinic through [email or web address] or phone. If you have a critical or abnormal lab result, we will notify you by phone as soon as possible.  Submit refill requests through [employer] or call your pharmacy and they will forward the refill request to us. Please allow 3 business days for your refill to be completed.          Additional Information About Your Visit        Alum.nihart Information     [employer] gives you secure access to your electronic health record. If you see a primary care provider, you can also send messages to your care team and make appointments. If you have questions, please call your primary care clinic.  If you do not have a primary care provider, please call [phone] and they will assist you.        Care EveryWhere  ID     This is your Care EveryWhere ID. This could be used by other organizations to access your [facility] medical records  [id number]        Your Vitals Were     Pulse Last Period BMI (Body Mass Index)             80 11/27/2017 (Exact Date) 34.01 kg/m2          Blood Pressure from Last 3 Encounters:   04/20/18 122/60   03/09/18 116/71   02/26/18 116/60    Weight from Last 3 Encounters:   04/20/18 192 lb (87.1 kg)   03/09/18 181 lb (82.1 kg)   02/26/18 182 lb (82.6 kg)               Primary Care Provider Fax #    Physician No Ref-Primary [phone]       No address on file        Equal Access to Services     ANAHI DIAZ : Guera Johnson, kinsey shultz, sarkis kaalmaezequiel espino, garrick perrin . So [facility] [phone].    ATENCIÓN: Si habla español, tiene a turpin disposición servicios gratuitos de asistencia lingüística. Llame al [phone].    We comply with applicable federal civil rights laws and [place] laws. We do not discriminate on the basis of race, color, national origin, age, disability, sex, sexual orientation, or gender identity.            Thank you!     Thank you for choosing [facility]  for your care. Our goal is always to provide you with excellent care. Hearing back from our patients is one way we can continue to improve our services. Please take a few minutes to complete the written survey that you may receive in the mail after your visit with us. Thank you!             Your Updated Medication List - Protect others around you: Learn how to safely use, store and throw away your medicines at www.disposemymeds.org.          This list is accurate as of 4/20/18  4:14 PM.  Always use your most recent med list.                   Brand Name Dispense Instructions for use Diagnosis    albuterol 108 (90 Base) MCG/ACT Inhaler    PROAIR HFA/PROVENTIL HFA/VENTOLIN HFA    1 Inhaler    Inhale 2 puffs into the lungs every 6 hours as needed for shortness  of breath / dyspnea    Intermittent asthma       FLOVENT  MCG/ACT Inhaler   Generic drug:  fluticasone     3 Inhaler    INHALE 2 PUFFS INTO THE LUNGS 2 TIMES DAILY    Intermittent asthma       PRENATAL 1 PO       Pregnancy examination or test, pregnancy unconfirmed

## 2018-04-20 NOTE — NURSING NOTE
"Chief Complaint   Patient presents with     Prenatal Care       Initial /60  Pulse 80  Wt 192 lb (87.1 kg)  LMP 11/27/2017 (Exact Date)  BMI 34.01 kg/m2 Estimated body mass index is 34.01 kg/(m^2) as calculated from the following:    Height as of 11/24/15: 5' 3\" (1.6 m).    Weight as of this encounter: 192 lb (87.1 kg).  Medication Reconciliation: complete     20w4d    "

## 2018-05-09 ENCOUNTER — TRANSFERRED RECORDS (OUTPATIENT)
Dept: HEALTH INFORMATION MANAGEMENT | Facility: CLINIC | Age: 28
End: 2018-05-09

## 2018-05-14 ENCOUNTER — PRENATAL OFFICE VISIT (OUTPATIENT)
Dept: OBGYN | Facility: OTHER | Age: 28
End: 2018-05-14
Payer: COMMERCIAL

## 2018-05-14 VITALS
DIASTOLIC BLOOD PRESSURE: 54 MMHG | WEIGHT: 199 LBS | BODY MASS INDEX: 35.25 KG/M2 | HEART RATE: 92 BPM | SYSTOLIC BLOOD PRESSURE: 120 MMHG

## 2018-05-14 DIAGNOSIS — Z53.9 ERRONEOUS ENCOUNTER--DISREGARD: Primary | ICD-10-CM

## 2018-05-14 DIAGNOSIS — Z34.92 NORMAL PREGNANCY IN SECOND TRIMESTER: Primary | ICD-10-CM

## 2018-05-14 DIAGNOSIS — Z22.7 LTBI (LATENT TUBERCULOSIS INFECTION): ICD-10-CM

## 2018-05-14 DIAGNOSIS — J45.20 MILD INTERMITTENT ASTHMA, UNSPECIFIED WHETHER COMPLICATED: ICD-10-CM

## 2018-05-14 LAB
GLUCOSE 1H P 50 G GLC PO SERPL-MCNC: 114 MG/DL (ref 60–129)
HGB BLD-MCNC: 11.6 G/DL (ref 11.7–15.7)

## 2018-05-14 PROCEDURE — 00000218 ZZHCL STATISTIC OBHBG - HEMOGLOBIN: Performed by: OBSTETRICS & GYNECOLOGY

## 2018-05-14 PROCEDURE — 99207 ZZC PRENATAL VISIT: CPT | Performed by: OBSTETRICS & GYNECOLOGY

## 2018-05-14 PROCEDURE — 36415 COLL VENOUS BLD VENIPUNCTURE: CPT | Performed by: OBSTETRICS & GYNECOLOGY

## 2018-05-14 PROCEDURE — 82950 GLUCOSE TEST: CPT | Performed by: OBSTETRICS & GYNECOLOGY

## 2018-05-14 ASSESSMENT — PAIN SCALES - GENERAL: PAINLEVEL: NO PAIN (0)

## 2018-05-14 NOTE — PROGRESS NOTES
28 year old  at 24w0d weeks presents to the clinic for a routine prenatal visit.  No concerns.  No vaginal bleeding, leakage of fluid, or contractions   Good fetal movement.  FHTs= 148  Fundal height=25cm  Normal anatomy ultrasound but cardiac views not well seen.  Patient has a repeat ultrasound with MFM in   RT 4 weeks  Asthma=stable  GCT today  Blood type:O+    Jailyn Palacios

## 2018-05-14 NOTE — MR AVS SNAPSHOT
After Visit Summary   5/14/2018    Mary Torres    MRN: [id number]           Patient Information     Date Of Birth          1990        Visit Information        Provider Department      5/14/2018 8:15 AM Jailyn Palacios, DO [facility]        Today's Diagnoses     Normal pregnancy in second trimester    -  1    LTBI (latent tuberculosis infection)        Mild intermittent asthma, unspecified whether complicated          Care Instructions    What to watch out for are: regular contractions every 5 min, vaginal bleeding, decreased fetal movement, or leakage of fluid.  Please call the office or go to L&D if you develop any of these signs and symptoms.      I will see you for your follow up appointment.  Please feel free to call if you have any questions or concerns.      Thanks,  Jailyn Palacios, DO            Follow-ups after your visit        Follow-up notes from your care team     Return in about 4 weeks (around 6/11/2018).      Who to contact     If you have questions or need follow up information about today's clinic visit or your schedule please contact [facility] directly at [phone].  Normal or non-critical lab and imaging results will be communicated to you by Population Diagnosticshart, letter or phone within 4 business days after the clinic has received the results. If you do not hear from us within 7 days, please contact the clinic through [employer] or phone. If you have a critical or abnormal lab result, we will notify you by phone as soon as possible.  Submit refill requests through [employer] or call your pharmacy and they will forward the refill request to us. Please allow 3 business days for your refill to be completed.          Additional Information About Your Visit        Population Diagnostics[employer] Information     [employer] gives you secure access to your electronic health record. If you see a primary care provider, you can also send messages to your care team and make appointments.  If you have questions, please call your primary care clinic.  If you do not have a primary care provider, please call [phone] and they will assist you.        Care EveryWhere ID     This is your Care EveryWhere ID. This could be used by other organizations to access your [facility] medical records  [id number]        Your Vitals Were     Pulse Last Period BMI (Body Mass Index)             92 11/27/2017 (Exact Date) 35.25 kg/m2          Blood Pressure from Last 3 Encounters:   05/14/18 120/54   04/20/18 122/60   03/09/18 116/71    Weight from Last 3 Encounters:   05/14/18 199 lb (90.3 kg)   04/20/18 192 lb (87.1 kg)   03/09/18 181 lb (82.1 kg)              We Performed the Following     Glucose tolerance gest screen 1 hour     OB hemoglobin        Primary Care Provider Fax #    Physician No Ref-Primary [phone]       No address on file        Equal Access to Services     ANAHI DIAZ : Hadii sylvester Johnson, waaxda luannamarie, qaybta kaalmada srinivas, garrick perrin . So [facility] [phone].    ATENCIÓN: Si habla español, tiene a turpin disposición servicios gratuitos de asistencia lingüística. Llame al [phone].    We comply with applicable federal civil rights laws and [place] laws. We do not discriminate on the basis of race, color, national origin, age, disability, sex, sexual orientation, or gender identity.            Thank you!     Thank you for choosing [facility]  for your care. Our goal is always to provide you with excellent care. Hearing back from our patients is one way we can continue to improve our services. Please take a few minutes to complete the written survey that you may receive in the mail after your visit with us. Thank you!             Your Updated Medication List - Protect others around you: Learn how to safely use, store and throw away your medicines at www.disposemymeds.org.          This list is accurate as of 5/14/18  8:33 AM.   Always use your most recent med list.                   Brand Name Dispense Instructions for use Diagnosis    albuterol 108 (90 Base) MCG/ACT Inhaler    PROAIR HFA/PROVENTIL HFA/VENTOLIN HFA    1 Inhaler    Inhale 2 puffs into the lungs every 6 hours as needed for shortness of breath / dyspnea    Intermittent asthma       FLOVENT  MCG/ACT Inhaler   Generic drug:  fluticasone     3 Inhaler    INHALE 2 PUFFS INTO THE LUNGS 2 TIMES DAILY    Intermittent asthma       PRENATAL 1 PO       Pregnancy examination or test, pregnancy unconfirmed

## 2018-05-14 NOTE — PATIENT INSTRUCTIONS
What to watch out for are: regular contractions every 5 min, vaginal bleeding, decreased fetal movement, or leakage of fluid.  Please call the office or go to L&D if you develop any of these signs and symptoms.      I will see you for your follow up appointment.  Please feel free to call if you have any questions or concerns.      Thanks,  Jailyn Palacios, DO

## 2018-06-06 ENCOUNTER — TRANSFERRED RECORDS (OUTPATIENT)
Dept: HEALTH INFORMATION MANAGEMENT | Facility: CLINIC | Age: 28
End: 2018-06-06

## 2018-06-11 ENCOUNTER — PRENATAL OFFICE VISIT (OUTPATIENT)
Dept: OBGYN | Facility: OTHER | Age: 28
End: 2018-06-11
Payer: COMMERCIAL

## 2018-06-11 VITALS
DIASTOLIC BLOOD PRESSURE: 60 MMHG | WEIGHT: 204.5 LBS | BODY MASS INDEX: 36.23 KG/M2 | HEART RATE: 84 BPM | SYSTOLIC BLOOD PRESSURE: 118 MMHG

## 2018-06-11 DIAGNOSIS — Z98.891 H/O: C-SECTION: Chronic | ICD-10-CM

## 2018-06-11 DIAGNOSIS — Z23 NEED FOR VACCINATION: ICD-10-CM

## 2018-06-11 DIAGNOSIS — Z34.93 NORMAL PREGNANCY IN THIRD TRIMESTER: Primary | ICD-10-CM

## 2018-06-11 PROCEDURE — 99207 ZZC PRENATAL VISIT: CPT | Performed by: OBSTETRICS & GYNECOLOGY

## 2018-06-11 PROCEDURE — 90471 IMMUNIZATION ADMIN: CPT | Performed by: OBSTETRICS & GYNECOLOGY

## 2018-06-11 PROCEDURE — 90715 TDAP VACCINE 7 YRS/> IM: CPT | Performed by: OBSTETRICS & GYNECOLOGY

## 2018-06-11 NOTE — PROGRESS NOTES
28 year old  at 28w6d weeks presents to the clinic for a routine prenatal visit.  No concerns.  No vaginal bleeding, leakage of fluid, or contractions   Good fetal movement.  Fundal height=29cm  CLQh=451  IVV=472  Hgb=11.6  Rh O+  RTC 2 weeks.  TDAP today  ERCS=try for   Asthma=stable    Jailyn Palacios

## 2018-06-11 NOTE — MR AVS SNAPSHOT
After Visit Summary   2018    Mary Torres    MRN: [id number]           Patient Information     Date Of Birth          1990        Visit Information        Provider Department      2018 8:15 AM Jailyn Palacios,  [facility]        Today's Diagnoses     Normal pregnancy in third trimester    -  1    H/O:           Care Instructions    What to watch out for are: regular contractions every 5 min, vaginal bleeding, decreased fetal movement, or leakage of fluid.  Please call the office or go to L&D if you develop any of these signs and symptoms.      I will see you for your follow up appointment.  Please feel free to call if you have any questions or concerns.      Thanks,  Jailyn Palacios, DO            Follow-ups after your visit        Follow-up notes from your care team     Return in about 2 weeks (around 2018).      Who to contact     If you have questions or need follow up information about today's clinic visit or your schedule please contact [facility] directly at [phone].  Normal or non-critical lab and imaging results will be communicated to you by [employer], letter or phone within 4 business days after the clinic has received the results. If you do not hear from us within 7 days, please contact the clinic through [employer] or phone. If you have a critical or abnormal lab result, we will notify you by phone as soon as possible.  Submit refill requests through [employer] or call your pharmacy and they will forward the refill request to us. Please allow 3 business days for your refill to be completed.          Additional Information About Your Visit        Cant[employer] Information     [employer] gives you secure access to your electronic health record. If you see a primary care provider, you can also send messages to your care team and make appointments. If you have questions, please call your primary care clinic.  If you do not have a  primary care provider, please call [phone] and they will assist you.        Care EveryWhere ID     This is your Care EveryWhere ID. This could be used by other organizations to access your [facility] medical records  [id number]        Your Vitals Were     Pulse Last Period BMI (Body Mass Index)             84 11/27/2017 (Exact Date) 36.23 kg/m2          Blood Pressure from Last 3 Encounters:   06/11/18 118/60   05/14/18 120/54   04/20/18 122/60    Weight from Last 3 Encounters:   06/11/18 204 lb 8 oz (92.8 kg)   05/14/18 199 lb (90.3 kg)   04/20/18 192 lb (87.1 kg)              We Performed the Following     Neema-Operative Worksheet        Primary Care Provider Fax #    Physician No Ref-Primary [phone]       No address on file        Equal Access to Services     ANAHI DIAZ : Hadii sylvester Johnson, waaxezequiel shultz, qamumtazta kaalmaezequiel espino, garrick perrin . So [facility] [phone].    ATENCIÓN: Si habla español, tiene a turpin disposición servicios gratuitos de asistencia lingüística. Llame al [phone].    We comply with applicable federal civil rights laws and [place] laws. We do not discriminate on the basis of race, color, national origin, age, disability, sex, sexual orientation, or gender identity.            Thank you!     Thank you for choosing [facility]  for your care. Our goal is always to provide you with excellent care. Hearing back from our patients is one way we can continue to improve our services. Please take a few minutes to complete the written survey that you may receive in the mail after your visit with us. Thank you!             Your Updated Medication List - Protect others around you: Learn how to safely use, store and throw away your medicines at www.disposemymeds.org.          This list is accurate as of 6/11/18  8:36 AM.  Always use your most recent med list.                   Brand Name Dispense Instructions for use Diagnosis     albuterol 108 (90 Base) MCG/ACT Inhaler    PROAIR HFA/PROVENTIL HFA/VENTOLIN HFA    1 Inhaler    Inhale 2 puffs into the lungs every 6 hours as needed for shortness of breath / dyspnea    Intermittent asthma       FLOVENT  MCG/ACT Inhaler   Generic drug:  fluticasone     3 Inhaler    INHALE 2 PUFFS INTO THE LUNGS 2 TIMES DAILY    Intermittent asthma       PRENATAL 1 PO       Pregnancy examination or test, pregnancy unconfirmed

## 2018-06-12 ENCOUNTER — TELEPHONE (OUTPATIENT)
Dept: OBGYN | Facility: CLINIC | Age: 28
End: 2018-06-12

## 2018-06-12 NOTE — TELEPHONE ENCOUNTER
Surgery Scheduled.    Date of Surgery 18 Time of Surgery 12:30 pm  Procedure: Repeat C/Section  Hospital/Surgical Facility[place] [facility]  Surgeon: Dr. Palacios  Type of Anesthesia Anticipated: Spinal  Pre-op:To be done by Dr. Palacios at [facility]  6 week post op 10/05/18 with Dr. Palacios at [facility]  Pre-certification 18  Consent signed: NA  Hospital Stay 3 days       [facility] surgery packet mailed to patient's home address.  Patient instructed NPO 12 hours prior to surgery, arrive 1 1/2 hours prior to surgery, must not have a .  Patient understood and agrees to the plan.      Surgery Pre-Certification    Medical Record Number: [id number]  Mary Torres  YOB: 1990   Phone: [phone] (home) [phone] (work)  Primary Provider: No Ref-Primary, Physician    Reason for Admit:  Previous     Surgeon: Dr. Palacios  Surgical Procedure: Repeat   ICD-9 Coded: O34.21  Date of Surgery: 18  Consent signed? N/A     Hospital: [facility]  Inpatient- Length of stay:  3 days.    Requestor:  Mame Hoover     Location:  [facility]    Bella Vizcarra CMA

## 2018-06-12 NOTE — TELEPHONE ENCOUNTER
Associated Diagnoses      H/O:            Comments      Body mass index is 36.23 kg/(m^2).          Order Questions      Question Answer Comment     Procedure name(s) - multi select Repeat c section      Is this a multi surgeon case? No      Laterality N/A      Reason for procedure History of c sections x2      Location of Case: [facility]      Surgeon Procedure Time (incision to closure) in minutes (per procedure as applicable) 60      Note:  Surgical Case Time Needed (in minutes)     Patient Class (for admit prior to surgery, specify number of days in comments): Inpatient      Length of Stay: 3 days      Anesthesia Spinal      H&P To Be Completed By: Surgeon      Post-Op Appointment 6 weeks      Vendor Needed? No

## 2018-06-18 NOTE — TELEPHONE ENCOUNTER
Spoke to Claire, at [employer], CPT code:57140 code is valid and billable, if patient stays longer then 96 hours then will need to do a PA on this. Call ref. # [id number] Claire 06/18/18 10:01am

## 2018-06-26 ENCOUNTER — PRENATAL OFFICE VISIT (OUTPATIENT)
Dept: OBGYN | Facility: OTHER | Age: 28
End: 2018-06-26
Payer: COMMERCIAL

## 2018-06-26 VITALS
BODY MASS INDEX: 36.98 KG/M2 | HEART RATE: 68 BPM | WEIGHT: 208.75 LBS | SYSTOLIC BLOOD PRESSURE: 130 MMHG | DIASTOLIC BLOOD PRESSURE: 56 MMHG

## 2018-06-26 DIAGNOSIS — Z98.891 H/O: C-SECTION: Primary | Chronic | ICD-10-CM

## 2018-06-26 DIAGNOSIS — Z34.03 ENCOUNTER FOR SUPERVISION OF NORMAL FIRST PREGNANCY IN THIRD TRIMESTER: ICD-10-CM

## 2018-06-26 PROCEDURE — 99207 ZZC PRENATAL VISIT: CPT | Performed by: ADVANCED PRACTICE MIDWIFE

## 2018-06-26 NOTE — MR AVS SNAPSHOT
After Visit Summary   2018    Mary Torres    MRN: [id number]           Patient Information     Date Of Birth          1990        Visit Information        Provider Department      2018 8:30 AM Kandy Pope APRN CNM [facility]        Today's Diagnoses     H/O:     -  1    Encounter for supervision of normal first pregnancy in third trimester           Follow-ups after your visit        Follow-up notes from your care team     Return in about 2 weeks (around 7/10/2018).      Your next 10 appointments already scheduled     Oct 05, 2018  3:30 PM CDT   Post Partum with Jailyn Palacios,    [facility]    [address] [facility] [address] [phone]              Who to contact     If you have questions or need follow up information about today's clinic visit or your schedule please contact [facility] directly at [phone].  Normal or non-critical lab and imaging results will be communicated to you by Rocket Softwarehart, letter or phone within 4 business days after the clinic has received the results. If you do not hear from us within 7 days, please contact the clinic through statusboom or phone. If you have a critical or abnormal lab result, we will notify you by phone as soon as possible.  Submit refill requests through statusboom or call your pharmacy and they will forward the refill request to us. Please allow 3 business days for your refill to be completed.          Additional Information About Your Visit        [employer] Information     statusboom gives you secure access to your electronic health record. If you see a primary care provider, you can also send messages to your care team and make appointments. If you have questions, please call your primary care clinic.  If you do not have a primary care provider, please call [phone] and they will assist you.        Care EveryWhere ID     This is  your Care EveryWhere ID. This could be used by other organizations to access your [place] medical records  [id number]        Your Vitals Were     Pulse Last Period BMI (Body Mass Index)             68 11/27/2017 (Exact Date) 36.98 kg/m2          Blood Pressure from Last 3 Encounters:   06/26/18 130/56   06/11/18 118/60   05/14/18 120/54    Weight from Last 3 Encounters:   06/26/18 208 lb 12 oz (94.7 kg)   06/11/18 204 lb 8 oz (92.8 kg)   05/14/18 199 lb (90.3 kg)              Today, you had the following     No orders found for display       Primary Care Provider Fax #    Physician No Ref-Primary [phone]       No address on file        Equal Access to Services     ANAHI DIAZ : Guera Johnson, waaxda luqadaha, qaybta kaalmada srinivas, garrick perrin . So [facility] [phone].    ATENCIÓN: Si habla español, tiene a turpin disposición servicios gratuitos de asistencia lingüística. Llame al [phone].    We comply with applicable federal civil rights laws and [place] laws. We do not discriminate on the basis of race, color, national origin, age, disability, sex, sexual orientation, or gender identity.            Thank you!     Thank you for choosing [facility]  for your care. Our goal is always to provide you with excellent care. Hearing back from our patients is one way we can continue to improve our services. Please take a few minutes to complete the written survey that you may receive in the mail after your visit with us. Thank you!             Your Updated Medication List - Protect others around you: Learn how to safely use, store and throw away your medicines at www.disposemymeds.org.          This list is accurate as of 6/26/18  8:54 AM.  Always use your most recent med list.                   Brand Name Dispense Instructions for use Diagnosis    albuterol 108 (90 Base) MCG/ACT Inhaler    PROAIR HFA/PROVENTIL HFA/VENTOLIN HFA    1 Inhaler    Inhale  2 puffs into the lungs every 6 hours as needed for shortness of breath / dyspnea    Intermittent asthma       FLOVENT  MCG/ACT Inhaler   Generic drug:  fluticasone     3 Inhaler    INHALE 2 PUFFS INTO THE LUNGS 2 TIMES DAILY    Intermittent asthma       PRENATAL 1 PO       Pregnancy examination or test, pregnancy unconfirmed

## 2018-06-26 NOTE — PROGRESS NOTES
Feeling well.  No complaints.   No contra/lof/vb  Will be stopping work soon.   Moving in to her new home a few days before her scheduled c/s.  Knows her limits.   Will breast feed.  POP for BC>   RTC 2 w  Kandy Lux, ISELA, CNM

## 2018-06-26 NOTE — NURSING NOTE
"Chief Complaint   Patient presents with     Prenatal Care       Initial /56 (BP Location: Left arm, Patient Position: Chair, Cuff Size: Adult Large)  Pulse 68  Wt 208 lb 12 oz (94.7 kg)  LMP 11/27/2017 (Exact Date)  BMI 36.98 kg/m2 Estimated body mass index is 36.98 kg/(m^2) as calculated from the following:    Height as of 11/24/15: 5' 3\" (1.6 m).    Weight as of this encounter: 208 lb 12 oz (94.7 kg).  Medication Reconciliation: complete    Berenice Gross CMA    "

## 2018-06-27 ENCOUNTER — TRANSFERRED RECORDS (OUTPATIENT)
Dept: HEALTH INFORMATION MANAGEMENT | Facility: CLINIC | Age: 28
End: 2018-06-27

## 2018-07-13 ENCOUNTER — PRENATAL OFFICE VISIT (OUTPATIENT)
Dept: OBGYN | Facility: OTHER | Age: 28
End: 2018-07-13
Payer: COMMERCIAL

## 2018-07-13 VITALS
HEART RATE: 84 BPM | SYSTOLIC BLOOD PRESSURE: 120 MMHG | DIASTOLIC BLOOD PRESSURE: 62 MMHG | WEIGHT: 211.75 LBS | BODY MASS INDEX: 37.51 KG/M2

## 2018-07-13 DIAGNOSIS — Z98.891 H/O: C-SECTION: Chronic | ICD-10-CM

## 2018-07-13 DIAGNOSIS — J45.20 MILD INTERMITTENT ASTHMA, UNSPECIFIED WHETHER COMPLICATED: ICD-10-CM

## 2018-07-13 DIAGNOSIS — Z34.93 NORMAL PREGNANCY IN THIRD TRIMESTER: Primary | ICD-10-CM

## 2018-07-13 PROCEDURE — 99207 ZZC PRENATAL VISIT: CPT | Performed by: OBSTETRICS & GYNECOLOGY

## 2018-07-13 NOTE — MR AVS SNAPSHOT
After Visit Summary   2018    Mary Torres    MRN: [id number]           Patient Information     Date Of Birth          1990        Visit Information        Provider Department      2018 4:30 PM Jailyn Palacios DO [facility]        Today's Diagnoses     Normal pregnancy in third trimester    -  1    H/O:         Mild intermittent asthma, unspecified whether complicated          Care Instructions    SIGNS OF  LABOR    Labor is  if it happens more than three weeks before your due date.    It can be hard to know if you are in labor, since the symptoms can be like the normal feelings of pregnancy.  Often, the only difference is the symptoms increase or they don't go away.     Signs of  labor can include:    Change in your vaginal discharge:  You will have more vaginal discharge when you are pregnant and it should be creamy white.  Call the clinic right away if your discharge has a foul odor, pink or bloody,  or if it becomes watery or is more than is normal for you during your pregnancy.    More than 5-6 contractions or tightenings per hour.  Contractions can feel like period cramps or bowel (gas or diarrhea) pain.  You will feel it in the lower part of your abdomen, in your back or as a pressure feeling in your bottom.  It is often regular, coming for 30 seconds or a minute and then going away, only to come back 5 or 10 minutes later. Some contractions are normal during pregnancy, but if you are feeling more than 5-6 in one hour, empty your bladder, then drink 16-24 ounces of water, eat a snack and lay down on your left side. Put your hand on your abdomen to count the contractions.  If after one hour of resting you have still had 5-6 contractions call your clinic.          Follow-ups after your visit        Follow-up notes from your care team     Return in about 2 weeks (around 2018).      Your next 10 appointments already  scheduled     Jul 30, 2018  9:00 AM CDT   ESTABL[facility]    [address] [facility] [id number] [phone]            Oct 05, 2018  3:30 PM CDT   Post Partum with Jailyn Avila AlexandrailDO   [facility]    [address] [facility] [id number] [phone]              Who to contact     If you have questions or need follow up information about today's clinic visit or your schedule please contact [facility] directly at [phone].  Normal or non-critical lab and imaging results will be communicated to you by alikehart, letter or phone within 4 business days after the clinic has received the results. If you do not hear from us within 7 days, please contact the clinic through [employer]is Network Technologyt or phone. If you have a critical or abnormal lab result, we will notify you by phone as soon as possible.  Submit refill requests through Keepcon or call your pharmacy and they will forward the refill request to us. Please allow 3 business days for your refill to be completed.          Additional Information About Your Visit        Keepcon Information     Keepcon gives you secure access to your electronic health record. If you see a primary care provider, you can also send messages to your care team and make appointments. If you have questions, please call your primary care clinic.  If you do not have a primary care provider, please call [phone] and they will assist you.        Care EveryWhere ID     This is your Care EveryWhere ID. This could be used by other organizations to access your [place] medical records  [id number]        Your Vitals Were     Pulse Last Period BMI (Body Mass Index)             84 11/27/2017 (Exact Date) 37.51 kg/m2          Blood Pressure from Last 3 Encounters:   07/13/18 120/62   06/26/18 130/56   06/11/18 118/60    Weight from Last 3  Encounters:   07/13/18 211 lb 12 oz (96 kg)   06/26/18 208 lb 12 oz (94.7 kg)   06/11/18 204 lb 8 oz (92.8 kg)              Today, you had the following     No orders found for display       Primary Care Provider Fax #    Physician No Ref-Primary [phone]       No address on file        Equal Access to Services     [facility] : Hadii aad ku hadasho Soomaali, waaxda luqadaha, qaybta kaalmada adeegyada, garrick kline quen elizabeth haywardyueeneida perrin . So [facility] [phone].    ATENCIÓN: Si habla español, tiene a turpin disposición servicios gratuitos de asistencia lingüística. Llame al [phone].    We comply with applicable federal civil rights laws and [place] laws. We do not discriminate on the basis of race, color, national origin, age, disability, sex, sexual orientation, or gender identity.            Thank you!     Thank you for choosing [facility]  for your care. Our goal is always to provide you with excellent care. Hearing back from our patients is one way we can continue to improve our services. Please take a few minutes to complete the written survey that you may receive in the mail after your visit with us. Thank you!             Your Updated Medication List - Protect others around you: Learn how to safely use, store and throw away your medicines at www.disposemymeds.org.          This list is accurate as of 7/13/18  4:46 PM.  Always use your most recent med list.                   Brand Name Dispense Instructions for use Diagnosis    albuterol 108 (90 Base) MCG/ACT Inhaler    PROAIR HFA/PROVENTIL HFA/VENTOLIN HFA    1 Inhaler    Inhale 2 puffs into the lungs every 6 hours as needed for shortness of breath / dyspnea    Intermittent asthma       FLOVENT  MCG/ACT Inhaler   Generic drug:  fluticasone     3 Inhaler    INHALE 2 PUFFS INTO THE LUNGS 2 TIMES DAILY    Intermittent asthma       PRENATAL 1 PO       Pregnancy examination or test, pregnancy unconfirmed

## 2018-07-13 NOTE — PROGRESS NOTES
28 year old  at 33w3d weeks presents to the clinic for a routine prenatal visit.    No concerns.  Patient denies any vaginal bleeding, leakage of fluid, or contractions     Good fetal movement  Fundal height=35cm  FHTs=160's  RCS=  Asthma=stable  Discussed labor precautions.  RTC 2 weeks    Jailyn Palacios

## 2018-07-30 ENCOUNTER — PRENATAL OFFICE VISIT (OUTPATIENT)
Dept: OBGYN | Facility: OTHER | Age: 28
End: 2018-07-30
Payer: COMMERCIAL

## 2018-07-30 VITALS
BODY MASS INDEX: 38 KG/M2 | WEIGHT: 214.5 LBS | DIASTOLIC BLOOD PRESSURE: 62 MMHG | SYSTOLIC BLOOD PRESSURE: 126 MMHG | HEART RATE: 96 BPM

## 2018-07-30 DIAGNOSIS — Z98.891 H/O: C-SECTION: Chronic | ICD-10-CM

## 2018-07-30 DIAGNOSIS — Z34.93 NORMAL PREGNANCY IN THIRD TRIMESTER: Primary | ICD-10-CM

## 2018-07-30 PROCEDURE — 99207 ZZC PRENATAL VISIT: CPT | Performed by: OBSTETRICS & GYNECOLOGY

## 2018-07-30 PROCEDURE — 87653 STREP B DNA AMP PROBE: CPT | Performed by: OBSTETRICS & GYNECOLOGY

## 2018-07-30 NOTE — PROGRESS NOTES
28 year old  at 35w6d weeks presents to the clinic for a routine prenatal visit.  No concerns.  No vaginal bleeding, leakage of fluid, or contractions  Fundal height=36cm  VIPk=386  CX=1-2/-2  GBS done today  ERCS=18  Asthma=stable  Labor precautions discussed  RTC 1 week    Jailyn Palacios

## 2018-07-30 NOTE — MR AVS SNAPSHOT
After Visit Summary   2018    Mary Torres    MRN: [id number]           Patient Information     Date Of Birth          1990        Visit Information        Provider Department      2018 9:00 AM Jailyn Palacios DO [facility]        Today's Diagnoses     Normal pregnancy in third trimester    -  1    H/O:           Care Instructions    What to watch out for are: regular contractions every 5 min, vaginal bleeding, decreased fetal movement, or leakage of fluid.  Please call the office or go to L&D if you develop any of these signs and symptoms.      I will see you for your follow up appointment.  Please feel free to call if you have any questions or concerns.      Thanks,  Jailyn Palacios, DO            Follow-ups after your visit        Follow-up notes from your care team     Return in about 1 week (around 2018).      Your next 10 appointments already scheduled     Oct 05, 2018  3:30 PM CDT   Post Partum with Jailyn Palacios DO   [facility]    [address] [place] [address] [phone]              Who to contact     If you have questions or need follow up information about today's clinic visit or your schedule please contact [facility] directly at [phone].  Normal or non-critical lab and imaging results will be communicated to you by MyChart, letter or phone within 4 business days after the clinic has received the results. If you do not hear from us within 7 days, please contact the clinic through MyChart or phone. If you have a critical or abnormal lab result, we will notify you by phone as soon as possible.  Submit refill requests through [employer] or call your pharmacy and they will forward the refill request to us. Please allow 3 business days for your refill to be completed.          Additional Information About Your Visit        MyChart Information     Character [employer] gives  you secure access to your electronic health record. If you see a primary care provider, you can also send messages to your care team and make appointments. If you have questions, please call your primary care clinic.  If you do not have a primary care provider, please call [phone] and they will assist you.        Care EveryWhere ID     This is your Care EveryWhere ID. This could be used by other organizations to access your [place] medical records  [id number]        Your Vitals Were     Pulse Last Period BMI (Body Mass Index)             96 11/27/2017 (Exact Date) 38 kg/m2          Blood Pressure from Last 3 Encounters:   07/30/18 126/62   07/13/18 120/62   06/26/18 130/56    Weight from Last 3 Encounters:   07/30/18 214 lb 8 oz (97.3 kg)   07/13/18 211 lb 12 oz (96 kg)   06/26/18 208 lb 12 oz (94.7 kg)              We Performed the Following     Group B strep PCR        Primary Care Provider Fax #    Physician No Ref-Primary [phone]       No address on file        Equal Access to Services     BRAD [facility]: Hadii aad ku hadasho Soluis, waaxda luqadaha, qaybta kaalmada adejanieyaezequiel, garrick perrin . So [facility] [phone].    ATENCIÓN: Si habla español, tiene a turpin disposición servicios gratuitos de asistencia lingüística. Llame al [phone].    We comply with applicable federal civil rights laws and [place] laws. We do not discriminate on the basis of race, color, national origin, age, disability, sex, sexual orientation, or gender identity.            Thank you!     Thank you for choosing [facility]  for your care. Our goal is always to provide you with excellent care. Hearing back from our patients is one way we can continue to improve our services. Please take a few minutes to complete the written survey that you may receive in the mail after your visit with us. Thank you!             Your Updated Medication List - Protect others around you: Learn how to  safely use, store and throw away your medicines at www.disposemymeds.org.          This list is accurate as of 7/30/18  9:15 AM.  Always use your most recent med list.                   Brand Name Dispense Instructions for use Diagnosis    albuterol 108 (90 Base) MCG/ACT Inhaler    PROAIR HFA/PROVENTIL HFA/VENTOLIN HFA    1 Inhaler    Inhale 2 puffs into the lungs every 6 hours as needed for shortness of breath / dyspnea    Intermittent asthma       FLOVENT  MCG/ACT Inhaler   Generic drug:  fluticasone     3 Inhaler    INHALE 2 PUFFS INTO THE LUNGS 2 TIMES DAILY    Intermittent asthma       PRENATAL 1 PO       Pregnancy examination or test, pregnancy unconfirmed

## 2018-07-31 LAB
GP B STREP DNA SPEC QL NAA+PROBE: NEGATIVE
SPECIMEN SOURCE: NORMAL

## 2018-08-07 ENCOUNTER — PRENATAL OFFICE VISIT (OUTPATIENT)
Dept: OBGYN | Facility: CLINIC | Age: 28
End: 2018-08-07
Payer: COMMERCIAL

## 2018-08-07 VITALS
DIASTOLIC BLOOD PRESSURE: 70 MMHG | BODY MASS INDEX: 38.19 KG/M2 | HEART RATE: 97 BPM | OXYGEN SATURATION: 98 % | SYSTOLIC BLOOD PRESSURE: 119 MMHG | WEIGHT: 215.6 LBS

## 2018-08-07 DIAGNOSIS — Z98.891 H/O: C-SECTION: Chronic | ICD-10-CM

## 2018-08-07 DIAGNOSIS — Z34.93 NORMAL PREGNANCY IN THIRD TRIMESTER: Primary | ICD-10-CM

## 2018-08-07 DIAGNOSIS — J45.20 MILD INTERMITTENT ASTHMA, UNSPECIFIED WHETHER COMPLICATED: ICD-10-CM

## 2018-08-07 PROCEDURE — 99207 ZZC PRENATAL VISIT: CPT | Performed by: OBSTETRICS & GYNECOLOGY

## 2018-08-07 NOTE — PROGRESS NOTES
28 year old  at 37w0d weeks presents to the clinic for a routine prenatal visit.  No concerns.  Complains of feeling more pressure.  No vaginal bleeding, leakage of fluid, or contractions   Fundal height=38cm  KTWl=103  CX=3/80/-2  Discussed labor precautions  RTC 1 week  ERCS=  Asthma=stable  GBS=Negative    Jailyn Palacios

## 2018-08-07 NOTE — MR AVS SNAPSHOT
After Visit Summary   2018    Mary Torres    MRN: [id number]           Patient Information     Date Of Birth          1990        Visit Information        Provider Department      2018 2:15 PM Jailyn Palacios DO [facility]        Today's Diagnoses     Normal pregnancy in third trimester    -  1    H/O:         Mild intermittent asthma, unspecified whether complicated          Care Instructions    What to watch out for are: regular contractions every 5 min, vaginal bleeding, decreased fetal movement, or leakage of fluid.  Please call the office or go to L&D if you develop any of these signs and symptoms.      I will see you for your follow up appointment.  Please feel free to call if you have any questions or concerns.      Thanks,  Jailyn Palacios, DO            Follow-ups after your visit        Follow-up notes from your care team     Return in about 1 week (around 2018).      Your next 10 appointments already scheduled     Oct 05, 2018  3:30 PM CDT   Post Partum with Jailyn Palacios DO   [facility]    [address] [place] [address] [phone]              Who to contact     If you have questions or need follow up information about today's clinic visit or your schedule please contact [facility] directly at [phone].  Normal or non-critical lab and imaging results will be communicated to you by MyChart, letter or phone within 4 business days after the clinic has received the results. If you do not hear from us within 7 days, please contact the clinic through MyChart or phone. If you have a critical or abnormal lab result, we will notify you by phone as soon as possible.  Submit refill requests through [employer] or call your pharmacy and they will forward the refill request to us. Please allow 3 business days for your refill to be completed.          Additional  Information About Your Visit        [employer] Inchart Information     Betty[employer]il gives you secure access to your electronic health record. If you see a primary care provider, you can also send messages to your care team and make appointments. If you have questions, please call your primary care clinic.  If you do not have a primary care provider, please call [phone] and they will assist you.        Care EveryWhere ID     This is your Care EveryWhere ID. This could be used by other organizations to access your [place] medical records  [id number]        Your Vitals Were     Pulse Last Period Pulse Oximetry BMI (Body Mass Index)          97 11/27/2017 (Exact Date) 98% 38.19 kg/m2         Blood Pressure from Last 3 Encounters:   08/07/18 119/70   07/30/18 126/62   07/13/18 120/62    Weight from Last 3 Encounters:   08/07/18 215 lb 9.6 oz (97.8 kg)   07/30/18 214 lb 8 oz (97.3 kg)   07/13/18 211 lb 12 oz (96 kg)              Today, you had the following     No orders found for display       Primary Care Provider Fax #    Physician No Ref-Primary [phone]       No address on file        Equal Access to Services     [facility]: Hadii sylvester Johnson, waaxda luannamarie, qaybta kaalmada adeabdulkadirda, garrick perrin . So [facility] [phone].    ATENCIÓN: Si habla español, tiene a turpin disposición servicios gratuitos de asistencia lingüística. Llame al [phone].    We comply with applicable federal civil rights laws and [place] laws. We do not discriminate on the basis of race, color, national origin, age, disability, sex, sexual orientation, or gender identity.            Thank you!     Thank you for choosing [facility]  for your care. Our goal is always to provide you with excellent care. Hearing back from our patients is one way we can continue to improve our services. Please take a few minutes to complete the written survey that you may receive in the mail after your visit  with us. Thank you!             Your Updated Medication List - Protect others around you: Learn how to safely use, store and throw away your medicines at www.disposemymeds.org.          This list is accurate as of 8/7/18  2:19 PM.  Always use your most recent med list.                   Brand Name Dispense Instructions for use Diagnosis    albuterol 108 (90 Base) MCG/ACT Inhaler    PROAIR HFA/PROVENTIL HFA/VENTOLIN HFA    1 Inhaler    Inhale 2 puffs into the lungs every 6 hours as needed for shortness of breath / dyspnea    Intermittent asthma       FLOVENT  MCG/ACT Inhaler   Generic drug:  fluticasone     3 Inhaler    INHALE 2 PUFFS INTO THE LUNGS 2 TIMES DAILY    Intermittent asthma       PRENATAL 1 PO       Pregnancy examination or test, pregnancy unconfirmed

## 2018-08-13 ENCOUNTER — PRENATAL OFFICE VISIT (OUTPATIENT)
Dept: OBGYN | Facility: OTHER | Age: 28
End: 2018-08-13
Payer: COMMERCIAL

## 2018-08-13 VITALS
BODY MASS INDEX: 38.62 KG/M2 | DIASTOLIC BLOOD PRESSURE: 60 MMHG | HEART RATE: 88 BPM | WEIGHT: 218 LBS | SYSTOLIC BLOOD PRESSURE: 120 MMHG

## 2018-08-13 DIAGNOSIS — Z98.891 H/O: C-SECTION: Chronic | ICD-10-CM

## 2018-08-13 DIAGNOSIS — J45.20 MILD INTERMITTENT ASTHMA, UNSPECIFIED WHETHER COMPLICATED: ICD-10-CM

## 2018-08-13 DIAGNOSIS — Z34.93 NORMAL PREGNANCY IN THIRD TRIMESTER: Primary | ICD-10-CM

## 2018-08-13 PROCEDURE — 99207 ZZC PRENATAL VISIT: CPT | Performed by: OBSTETRICS & GYNECOLOGY

## 2018-08-13 NOTE — MR AVS SNAPSHOT
After Visit Summary   2018    Mary Torres    MRN: [id number]           Patient Information     Date Of Birth          1990        Visit Information        Provider Department      2018 7:45 AM Jailyn Palacios DO [facility]        Today's Diagnoses     Normal pregnancy in third trimester    -  1    H/O:         Mild intermittent asthma, unspecified whether complicated          Care Instructions    What to watch out for are: regular contractions every 5 min, vaginal bleeding, decreased fetal movement, or leakage of fluid.  Please call the office or go to L&D if you develop any of these signs and symptoms.      I will see you for your follow up appointment.  Please feel free to call if you have any questions or concerns.      Thanks,  Jailyn Palacios, DO            Follow-ups after your visit        Follow-up notes from your care team     Return in about 1 week (around 2018).      Your next 10 appointments already scheduled     Oct 05, 2018  3:30 PM CDT   Post Partum with Jailyn Palacios DO   [facility]    [address] [place] [address] [id number] [phone]              Who to contact     If you have questions or need follow up information about today's clinic visit or your schedule please contact [facility] directly at [phone].  Normal or non-critical lab and imaging results will be communicated to you by MyChart, letter or phone within 4 business days after the clinic has received the results. If you do not hear from us within 7 days, please contact the clinic through MyChart or phone. If you have a critical or abnormal lab result, we will notify you by phone as soon as possible.  Submit refill requests through Imagry or call your pharmacy and they will forward the refill request to us. Please allow 3 business days for your refill to be completed.          Additional  Information About Your Visit        [employer] Information     [employer] gives you secure access to your electronic health record. If you see a primary care provider, you can also send messages to your care team and make appointments. If you have questions, please call your primary care clinic.  If you do not have a primary care provider, please call [phone] and they will assist you.        Care EveryWhere ID     This is your Care EveryWhere ID. This could be used by other organizations to access your [place] medical records  [id number]        Your Vitals Were     Pulse Last Period BMI (Body Mass Index)             88 11/27/2017 (Exact Date) 38.62 kg/m2          Blood Pressure from Last 3 Encounters:   08/13/18 120/60   08/07/18 119/70   07/30/18 126/62    Weight from Last 3 Encounters:   08/13/18 218 lb (98.9 kg)   08/07/18 215 lb 9.6 oz (97.8 kg)   07/30/18 214 lb 8 oz (97.3 kg)              Today, you had the following     No orders found for display       Primary Care Provider Fax #    Physician No Ref-Primary [phone]       No address on file        Equal Access to Services     BRAD DIAZ : Hadii sylvester bruner Soluis, waarchieda lexii, qaybta kaalmaezequiel espino, garrick perrin . So [facility] [phone].    ATENCIÓN: Si habla español, tiene a turpin disposición servicios gratZuni Comprehensive Health Centeros de asistencia lingüística. Llame al [phone].    We comply with applicable federal civil rights laws and [place] laws. We do not discriminate on the basis of race, color, national origin, age, disability, sex, sexual orientation, or gender identity.            Thank you!     Thank you for choosing [facility]  for your care. Our goal is always to provide you with excellent care. Hearing back from our patients is one way we can continue to improve our services. Please take a few minutes to complete the written survey that you may receive in the mail after your visit with us. Thank  you!             Your Updated Medication List - Protect others around you: Learn how to safely use, store and throw away your medicines at www.disposemymeds.org.          This list is accurate as of 8/13/18  8:07 AM.  Always use your most recent med list.                   Brand Name Dispense Instructions for use Diagnosis    albuterol 108 (90 Base) MCG/ACT inhaler    PROAIR HFA/PROVENTIL HFA/VENTOLIN HFA    1 Inhaler    Inhale 2 puffs into the lungs every 6 hours as needed for shortness of breath / dyspnea    Intermittent asthma       FLOVENT  MCG/ACT Inhaler   Generic drug:  fluticasone     3 Inhaler    INHALE 2 PUFFS INTO THE LUNGS 2 TIMES DAILY    Intermittent asthma       PRENATAL 1 PO       Pregnancy examination or test, pregnancy unconfirmed

## 2018-08-13 NOTE — NURSING NOTE
"Chief Complaint   Patient presents with     Prenatal Care       Initial /60 (BP Location: Left arm, Patient Position: Chair, Cuff Size: Adult Regular)  Pulse 88  Wt 218 lb (98.9 kg)  LMP 2017 (Exact Date)  BMI 38.62 kg/m2 Estimated body mass index is 38.62 kg/(m^2) as calculated from the following:    Height as of 11/24/15: 5' 3\" (1.6 m).    Weight as of this encounter: 218 lb (98.9 kg).  BP completed using cuff size: regular        Jamila Delgado, [facility]  2018          "

## 2018-10-05 ENCOUNTER — PRENATAL OFFICE VISIT (OUTPATIENT)
Dept: OBGYN | Facility: OTHER | Age: 28
End: 2018-10-05
Payer: COMMERCIAL

## 2018-10-05 VITALS
BODY MASS INDEX: 35.47 KG/M2 | DIASTOLIC BLOOD PRESSURE: 70 MMHG | HEART RATE: 76 BPM | WEIGHT: 200.25 LBS | SYSTOLIC BLOOD PRESSURE: 110 MMHG

## 2018-10-05 DIAGNOSIS — Z30.011 ENCOUNTER FOR INITIAL PRESCRIPTION OF CONTRACEPTIVE PILLS: ICD-10-CM

## 2018-10-05 DIAGNOSIS — D22.9 ATYPICAL MOLE: ICD-10-CM

## 2018-10-05 PROCEDURE — 99207 ZZC POST PARTUM EXAM: CPT | Performed by: OBSTETRICS & GYNECOLOGY

## 2018-10-05 RX ORDER — ACETAMINOPHEN AND CODEINE PHOSPHATE 120; 12 MG/5ML; MG/5ML
0.35 SOLUTION ORAL DAILY
Qty: 84 TABLET | Refills: 3 | Status: SHIPPED | OUTPATIENT
Start: 2018-10-05 | End: 2019-07-22

## 2018-10-05 NOTE — PATIENT INSTRUCTIONS
Please call if you any questions.    [address]   [address]  [phone]        Jailyn Palacios

## 2018-10-05 NOTE — MR AVS SNAPSHOT
After Visit Summary   10/5/2018    Mary Torres    MRN: [id number]           Patient Information     Date Of Birth          1990        Visit Information        Provider Department      10/5/2018 3:30 PM Jailyn Palacios DO [facility]        Today's Diagnoses     Routine postpartum follow-up    -  1    Encounter for initial prescription of contraceptive pills        Atypical mole          Care Instructions    Please call if you any questions.    [facility]  [address] [place]   [address]  [phone]        Jailyn Palacios            Follow-ups after your visit        Additional Services     DERMATOLOGY REFERRAL       Your provider has referred you to: [facility]: [facility] [phone]   [email or web address]    Please be aware that coverage of these services is subject to the terms and limitations of your health insurance plan.  Call member services at your health plan with any benefit or coverage questions.      Please bring the following with you to your appointment:    (1) Any X-Rays, CTs or MRIs which have been performed.  Contact the facility where they were done to arrange for  prior to your scheduled appointment.    (2) List of current medications  (3) This referral request   (4) Any documents/labs given to you for this referral                  Follow-up notes from your care team     Return in about 1 year (around 10/5/2019).      Who to contact     If you have questions or need follow up information about today's clinic visit or your schedule please contact [facility] directly at [phone].  Normal or non-critical lab and imaging results will be communicated to you by MyChart, letter or phone within 4 business days after the clinic has received the results. If you do not hear from us within 7 days, please contact the clinic through iStorezt  or phone. If you have a critical or abnormal lab result, we will notify you by phone as soon as possible.  Submit refill requests through [employer] or call your pharmacy and they will forward the refill request to us. Please allow 3 business days for your refill to be completed.          Additional Information About Your Visit        [employer] Information     [employer] gives you secure access to your electronic health record. If you see a primary care provider, you can also send messages to your care team and make appointments. If you have questions, please call your primary care clinic.  If you do not have a primary care provider, please call [phone] and they will assist you.        Care EveryWhere ID     This is your Care EveryWhere ID. This could be used by other organizations to access your [place] medical records  [id number]        Your Vitals Were     Pulse Last Period BMI (Body Mass Index)             76 11/27/2017 (Exact Date) 35.47 kg/m2          Blood Pressure from Last 3 Encounters:   10/05/18 110/70   08/13/18 120/60   08/07/18 119/70    Weight from Last 3 Encounters:   10/05/18 200 lb 4 oz (90.8 kg)   08/13/18 218 lb (98.9 kg)   08/07/18 215 lb 9.6 oz (97.8 kg)              We Performed the Following     DERMATOLOGY REFERRAL          Today's Medication Changes          These changes are accurate as of 10/5/18  3:58 PM.  If you have any questions, ask your nurse or doctor.               Start taking these medicines.        Dose/Directions    norethindrone 0.35 MG per tablet   Commonly known as:  MICRONOR   Used for:  Encounter for initial prescription of contraceptive pills   Started by:  Jailyn Palacios DO        Dose:  0.35 mg   Take 1 tablet (0.35 mg) by mouth daily   Quantity:  84 tablet   Refills:  3            Where to get your medicines      These medications were sent to [place] [address] [place] [address] [facility] [address]     Phone:  [phone]      norethindrone 0.35 MG per tablet                Primary Care Provider Fax #    Physician No Ref-Primary [phone]       No address on file        Equal Access to Services     ANAHI DIAZ : Hadii aad ku hadlinda Tapiaradhaali, kinsey josepatria, sarkis espino, garrick knappbean marylou. So [facility] [phone].    ATENCIÓN: Si habla español, tiene a turpin disposición servicios gratuitos de asistencia lingüística. Llame al [phone].    We comply with applicable federal civil rights laws and [place] laws. We do not discriminate on the basis of race, color, national origin, age, disability, sex, sexual orientation, or gender identity.            Thank you!     Thank you for choosing [facility]  for your care. Our goal is always to provide you with excellent care. Hearing back from our patients is one way we can continue to improve our services. Please take a few minutes to complete the written survey that you may receive in the mail after your visit with us. Thank you!             Your Updated Medication List - Protect others around you: Learn how to safely use, store and throw away your medicines at www.disposemymeds.org.          This list is accurate as of 10/5/18  3:58 PM.  Always use your most recent med list.                   Brand Name Dispense Instructions for use Diagnosis    albuterol 108 (90 Base) MCG/ACT inhaler    PROAIR HFA/PROVENTIL HFA/VENTOLIN HFA    1 Inhaler    Inhale 2 puffs into the lungs every 6 hours as needed for shortness of breath / dyspnea    Intermittent asthma       FLOVENT  MCG/ACT Inhaler   Generic drug:  fluticasone     3 Inhaler    INHALE 2 PUFFS INTO THE LUNGS 2 TIMES DAILY    Intermittent asthma       norethindrone 0.35 MG per tablet    MICRONOR    84 tablet    Take 1 tablet (0.35 mg) by mouth daily    Encounter for initial prescription of contraceptive pills       PRENATAL 1 PO       Pregnancy examination or test, pregnancy  unconfirmed

## 2018-10-05 NOTE — PROGRESS NOTES
Subjective  28 year old non-pregnant female presents today for a postpartum visit.  Patient had a RCS on 18.  No pain.  Some vaginal spotting.  No problems urinating.  Normal bowel movements.  Patient is breast feeding.  No signs and symptoms of ppd.  Patient scored a 2 on the PHQ-9.  No thoughts of suicide or infanticide.  Patient is not due for a pap smear today.  Patient is wanting to do an oral contractive pills for birth control.  Will do Micronor because she is breastfeeding.        ROS: 10 point ROS neg other than the symptoms noted above in the HPI.  Past Medical History:   Diagnosis Date     Asthma, intermittent      Past Surgical History:   Procedure Laterality Date      SECTION  13    Failure to descend     ENT SURGERY      wisdom teeth     Family History   Problem Relation Age of Onset     Asthma Mother      Diabetes Father      Thyroid Disease Father      Cancer Paternal Grandmother      lung cancer     Asthma Maternal Grandmother      Asthma Sister      C.A.D. No family hx of      Hypertension No family hx of      Cerebrovascular Disease No family hx of      Breast Cancer No family hx of      Cancer - colorectal No family hx of      Prostate Cancer No family hx of      Alcohol/Drug No family hx of      Allergies No family hx of      Alzheimer Disease No family hx of      Anesthesia Reaction No family hx of      Arthritis No family hx of      Blood Disease No family hx of      Cardiovascular No family hx of      Circulatory No family hx of      Congenital Anomalies No family hx of      Connective Tissue Disorder No family hx of      Depression No family hx of      Endocrine Disease No family hx of      Eye Disorder No family hx of      Genetic Disorder No family hx of      GASTROINTESTINAL DISEASE No family hx of      Genitourinary Problems No family hx of      Gynecology No family hx of      HEART DISEASE No family hx of      Lipids No family hx of      Musculoskeletal Disorder No  family hx of      Neurologic Disorder No family hx of      Obesity No family hx of      Osteoporosis No family hx of      Psychotic Disorder No family hx of      Respiratory No family hx of      Family History Negative No family hx of      Hearing Loss No family hx of      Social History   Substance Use Topics     Smoking status: Never Smoker     Smokeless tobacco: Never Used     Alcohol use No         Objective  Vitals: /70 (Patient Position: Sitting, Cuff Size: Adult Regular)  Pulse 76  Wt 200 lb 4 oz (90.8 kg)  LMP 11/27/2017 (Exact Date)  BMI 35.47 kg/m2  BMI= Body mass index is 35.47 kg/(m^2).       Skin=small lesion on right side of chest, likey atypical mole that was scratched  Abd=soft, Nontender/nondistended, incision=healed well        Assessment  1.)  Post partum visit  2.)  S/p RCS on 8/21  3.)  Birth control   4.)  Atypical mole       Plan  1.)  Micronor ordered  2.)  Dermatology referral        Jailyn Palacios

## 2018-10-06 ASSESSMENT — PATIENT HEALTH QUESTIONNAIRE - PHQ9: SUM OF ALL RESPONSES TO PHQ QUESTIONS 1-9: 0

## 2018-11-15 ENCOUNTER — OFFICE VISIT (OUTPATIENT)
Dept: DERMATOLOGY | Facility: CLINIC | Age: 28
End: 2018-11-15
Attending: OBSTETRICS & GYNECOLOGY
Payer: COMMERCIAL

## 2018-11-15 DIAGNOSIS — D48.5 NEOPLASM OF UNCERTAIN BEHAVIOR OF SKIN: Primary | ICD-10-CM

## 2018-11-15 PROCEDURE — 11100 HC BIOPSY SKIN/SUBQ/MUC MEM, SINGLE LESION: CPT | Performed by: DERMATOLOGY

## 2018-11-15 PROCEDURE — 88305 TISSUE EXAM BY PATHOLOGIST: CPT | Mod: TC | Performed by: DERMATOLOGY

## 2018-11-15 ASSESSMENT — PAIN SCALES - GENERAL: PAINLEVEL: NO PAIN (0)

## 2018-11-15 NOTE — NURSING NOTE
The following medication was given:     MEDICATION:  Lidocaine with epinephrine 1% 1:[id number]  ROUTE: SQ  SITE: see procedure note  DOSE: 1 cc  LOT #: -[id number]  : Ferny  EXPIRATION DATE: 09-19  NDC#: 8693-3717-34   Was there drug waste? No  Multi-dose vial: Yes    Cheryl Grey LPN  November 15, 2018

## 2018-11-15 NOTE — PATIENT INSTRUCTIONS

## 2018-11-15 NOTE — NURSING NOTE
Mary Torres's goals for this visit include:   Chief Complaint   Patient presents with     Spot check     of spot on right chest. No personal or family hx of SC.     She requests these members of her care team be copied on today's visit information:     PCP: No Ref-Primary, Physician    Referring Provider:  Jailyn Palacios DO  [address] [facility] [address]  [place] [address]    [facility] 11/27/2017 (Exact Date)    Do you need any medication refills at today's visit? No    Cheryl Grey LPN

## 2018-11-15 NOTE — LETTER
"    11/15/2018         RE: Mary Torres  [address] [facility] [address]        Dear Colleague,    Thank you for referring your patient, Mary Torres, to the [facility]. Please see a copy of my visit note below.    [facility] Dermatology Note      Dermatology Problem List:  1. NUB, right chest DDx: pyogenic granuloma vs AMM  -s/p biopsy 11/15/2018    Encounter Date: Nov 15, 2018    CC:  Chief Complaint   Patient presents with     Spot check     of spot on right chest. No personal or family hx of SC.         History of Present Illness:  Ms. Mary Torres is a 28 year old female who presents as a referral from Dr. Palacios fro an \"atypical mole\" on her chest. It started as a pink bump. It developed when she was pregnant. It got larger in size then had random episodes of bleeding that was very difficult to stop. Patient had her third baby girl in August, she is breast feeding but not exclusively. No other concerns addressed today.    Past Medical History:   Patient Active Problem List   Diagnosis     CARDIOVASCULAR SCREENING; LDL GOAL LESS THAN 160     Lymph node enlargement     Intermittent asthma     LTBI (latent tuberculosis infection)     Encounter for long-term (current) use of medications     Encounter for initial prescription of contraceptive pills     Past Medical History:   Diagnosis Date     Asthma, intermittent      Past Surgical History:   Procedure Laterality Date      SECTION  13    Failure to descend     ENT SURGERY      wisdom teeth       Social History:  Patient  reports that she has never smoked. She has never used smokeless tobacco. She reports that she does not drink alcohol or use illicit drugs. 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    Family History:  Family History   Problem Relation Age of Onset     Asthma Mother      Diabetes Father      Thyroid Disease " Father      Cancer Paternal Grandmother      lung cancer     Asthma Maternal Grandmother      Asthma Sister      C.A.D. No family hx of      Hypertension No family hx of      Cerebrovascular Disease No family hx of      Breast Cancer No family hx of      Cancer - colorectal No family hx of      Prostate Cancer No family hx of      Alcohol/Drug No family hx of      Allergies No family hx of      Alzheimer Disease No family hx of      Anesthesia Reaction No family hx of      Arthritis No family hx of      Blood Disease No family hx of      Cardiovascular No family hx of      Circulatory No family hx of      Congenital Anomalies No family hx of      Connective Tissue Disorder No family hx of      Depression No family hx of      Endocrine Disease No family hx of      Eye Disorder No family hx of      Genetic Disorder No family hx of      GASTROINTESTINAL DISEASE No family hx of      Genitourinary Problems No family hx of      Gynecology No family hx of      HEART DISEASE No family hx of      Lipids No family hx of      Musculoskeletal Disorder No family hx of      Neurologic Disorder No family hx of      Obesity No family hx of      Osteoporosis No family hx of      Psychotic Disorder No family hx of      Respiratory No family hx of      Family History Negative No family hx of      Hearing Loss No family hx of        Medications:  Current Outpatient Prescriptions   Medication Sig Dispense Refill     albuterol (PROAIR HFA, PROVENTIL HFA, VENTOLIN HFA) 108 (90 BASE) MCG/ACT inhaler Inhale 2 puffs into the lungs every 6 hours as needed for shortness of breath / dyspnea 1 Inhaler 3     FLOVENT  MCG/ACT inhaler INHALE 2 PUFFS INTO THE LUNGS 2 TIMES DAILY 3 Inhaler 1     norethindrone (MICRONOR) 0.35 MG per tablet Take 1 tablet (0.35 mg) by mouth daily 84 tablet 3     Prenatal MV-Min-Fe Fum-FA-DHA (PRENATAL 1 PO)          No Known Allergies    Review of Systems:  -Constitutional: Patient is otherwise feeling well, in  usual state of health.   -Skin: As above in HPI. No additional skin concerns.    Physical exam:  Vitals: LMP 11/27/2017 (Exact Date)  GEN: This is a well developed, well-nourished female in no acute distress, in a pleasant mood.    SKIN: Focused examination of the face, neck, chest, and upper extremities was performed.  -3 mm pink papule with collarette of scale, inferior to the right clavicle  -No other lesions of concern on areas examined.           Impression/Plan:  1. 3 mm pink papule with collarette of scale, right chest. Neoplasm of uncertain behavior. Ddx: pyogenic granuloma vs AMM. Favor pyogenic granuloma. Discussed that pyogenic granulomas are common in children and pregnant women. Discussed that the best treatment is shave biopsy for diagnosis and removal. Discussed unknown risk of using lidocaine/epinephrine while breastfeeding of passing this medication into the breast milk. Expert opinion that if present in breast milk, it is at low concentrations that should not have an effect on the baby. After discussion of risks and benefits, patient wished to proceed with biopsy for diagnosis/treatment today. She will plan to pump breast milk and toss for the next day to lower risk to her baby.     Shave biopsy:  After discussion of benefits and risks including but not limited to bleeding/bruising, pain/swelling, infection, scar, incomplete removal, nerve damage/numbness, recurrence, and non-diagnostic biopsy, written consent, verbal consent and photographs were obtained. Time-out was performed. The area was cleaned with isopropyl alcohol.  was injected to obtain adequate anesthesia of the lesion on the right chest. 0.5ml of 1% lidocaine with 1:100,000 epinephrine was injected to obtain adequate anesthesia. A  shave biopsy was performed. Hemostasis was achieved with cautery. Vaseline and a sterile dressing were applied. The patient tolerated the procedure and no complications were noted. The patient was provided  with verbal and written post care instructions.     Follow-up prn.     Staff Involved:  Scribe/Staff    Scribe Disclosure  I, Magali Draper, am serving as a scribe to document services personally performed by Dr. Katharina Lemus MD, based on data collection and the provider's statements to me.     Provider Disclosure:   The documentation recorded by the scribe accurately reflects the services I personally performed and the decisions made by me.    Katharina Lemus MD    Department of Dermatology  [facility]: Phone: [phone], Fax:[phone]  [facility]: Phone: [phone], Fax: [phone]              Again, thank you for allowing me to participate in the care of your patient.        Sincerely,        Katharina Lemus MD

## 2018-11-15 NOTE — MR AVS SNAPSHOT
After Visit Summary   11/15/2018    Mary Torres    MRN: [id number]           Patient Information     Date Of Birth          1990        Visit Information        Provider Department      11/15/2018 11:00 AM Katharina Lemus MD [facility]        Today's Diagnoses     Neoplasm of uncertain behavior of skin    -  1      Care Instructions    Wound Care After a Biopsy    What is a skin biopsy?  A skin biopsy allows the doctor to examine a very small piece of tissue under the microscope to determine the diagnosis and the best treatment for the skin condition. A local anesthetic (numbing medicine)  is injected with a very small needle into the skin area to be tested. A small piece of skin is taken from the area. Sometimes a suture (stitch) is used.     What are the risks of a skin biopsy?  I will experience scar, bleeding, swelling, pain, crusting and redness. I may experience incomplete removal or recurrence. Risks of this procedure are excessive bleeding, bruising, infection, nerve damage, numbness, thick (hypertrophic or keloidal) scar and non-diagnostic biopsy.    How should I care for my wound for the first 24 hours?    Keep the wound dry and covered for 24 hours    If it bleeds, hold direct pressure on the area for 15 minutes. If bleeding does not stop then go to the emergency room    Avoid strenuous exercise the first 1-2 days or as your doctor instructs you    How should I care for the wound after 24 hours?    After 24 hours, remove the bandage    You may bathe or shower as normal    If you had a scalp biopsy, you can shampoo as usual and can use shower water to clean the biopsy site daily    Clean the wound twice a day with gentle soap and water    Do not scrub, be gentle    Apply white petroleum/Vaseline after cleaning the wound with a cotton swab or a clean finger, and keep the site covered with a Bandaid /bandage. Bandages are not necessary with a scalp biopsy    If you  are unable to cover the site with a Bandaid /bandage, re-apply ointment 2-3 times a day to keep the site moist. Moisture will help with healing    Avoid strenuous activity for first 1-2 days    Avoid lakes, rivers, pools, and oceans until the stitches are removed or the site is healed    How do I clean my wound?    Wash hands thoroughly with soap or use hand  before all wound care    Clean the wound with gentle soap and water    Apply white petroleum/Vaseline  to wound after it is clean    Replace the Bandaid /bandage to keep the wound covered for the first few days or as instructed by your doctor    If you had a scalp biopsy, warm shower water to the area on a daily basis should suffice    What should I use to clean my wound?     Cotton-tipped applicators (Qtips )    White petroleum jelly (Vaseline ). Use a clean new container and use Q-tips to apply.    Bandaids   as needed    Gentle soap     How should I care for my wound long term?    Do not get your wound dirty    Keep up with wound care for one week or until the area is healed.    A small scab will form and fall off by itself when the area is completely healed. The area will be red and will become pink in color as it heals. Sun protection is very important for how your scar will turn out. Sunscreen with an SPF 30 or greater is recommended once the area is healed.    You should have some soreness but it should be mild and slowly go away over several days. Talk to your doctor about using tylenol for pain,    When should I call my doctor?  If you have increased:     Pain or swelling    Pus or drainage (clear or slightly yellow drainage is ok)    Temperature over 100F    Spreading redness or warmth around wound    When will I hear about my results?  The biopsy results can take 2-3 weeks to come back. The clinic will call you with the results, send you a [employer] message, or have you schedule a follow-up clinic or phone time to discuss the results. Contact  our clinics if you do not hear from us in 3 weeks.     Who should I call with questions?    [facility]: [phone]     [facility]: [phone]    For urgent needs outside of business hours call the [facility] at [phone] and ask for the dermatology resident on call              Follow-ups after your visit        Who to contact     If you have questions or need follow up information about today's clinic visit or your schedule please contact [facility] directly at [phone].  Normal or non-critical lab and imaging results will be communicated to you by [employer], letter or phone within 4 business days after the clinic has received the results. If you do not hear from us within 7 days, please contact the clinic through [employer] or phone. If you have a critical or abnormal lab result, we will notify you by phone as soon as possible.  Submit refill requests through [employer] or call your pharmacy and they will forward the refill request to us. Please allow 3 business days for your refill to be completed.          Additional Information About Your Visit        MyChart Information     [employer] gives you secure access to your electronic health record. If you see a primary care provider, you can also send messages to your care team and make appointments. If you have questions, please call your primary care clinic.  If you do not have a primary care provider, please call [phone] and they will assist you.      [employer] is an electronic gateway that provides easy, online access to your medical records. With [employer], you can request a clinic appointment, read your test results, renew a prescription or communicate with your care team.     To access your existing account, please contact your [facility] or call [phone] for assistance.        Care EveryWhere ID     This is your Care EveryWhere ID. This  could be used by other organizations to access your [place] medical records  [id number]        Your Vitals Were     Last Period                   11/27/2017 (Exact Date)            Blood Pressure from Last 3 Encounters:   10/05/18 110/70   08/13/18 120/60   08/07/18 119/70    Weight from Last 3 Encounters:   10/05/18 90.8 kg (200 lb 4 oz)   08/13/18 98.9 kg (218 lb)   08/07/18 97.8 kg (215 lb 9.6 oz)              Today, you had the following     No orders found for display       Primary Care Provider Fax #    Physician No Ref-Primary [phone]       No address on file        Equal Access to Services     BRAD DIAZ : Guera Johnson, watom shultz, sarkis bhatiaalmada srinivas, garrick perrin . So [facility] [phone].    ATENCIÓN: Si habla español, tiene a turpin disposición servicios gratuitos de asistencia lingüística. Llame al [phone].    We comply with applicable federal civil rights laws and [place] laws. We do not discriminate on the basis of race, color, national origin, age, disability, sex, sexual orientation, or gender identity.            Thank you!     Thank you for choosing [facility]  for your care. Our goal is always to provide you with excellent care. Hearing back from our patients is one way we can continue to improve our services. Please take a few minutes to complete the written survey that you may receive in the mail after your visit with us. Thank you!             Your Updated Medication List - Protect others around you: Learn how to safely use, store and throw away your medicines at www.disposemymeds.org.          This list is accurate as of 11/15/18 11:14 AM.  Always use your most recent med list.                   Brand Name Dispense Instructions for use Diagnosis    albuterol 108 (90 Base) MCG/ACT inhaler    PROAIR HFA/PROVENTIL HFA/VENTOLIN HFA    1 Inhaler    Inhale 2 puffs into the lungs every 6 hours as needed for shortness  of breath / dyspnea    Intermittent asthma       FLOVENT  MCG/ACT Inhaler   Generic drug:  fluticasone     3 Inhaler    INHALE 2 PUFFS INTO THE LUNGS 2 TIMES DAILY    Intermittent asthma       norethindrone 0.35 MG per tablet    MICRONOR    84 tablet    Take 1 tablet (0.35 mg) by mouth daily    Encounter for initial prescription of contraceptive pills       PRENATAL 1 PO       Pregnancy examination or test, pregnancy unconfirmed

## 2018-11-15 NOTE — PROGRESS NOTES
"[facility] Dermatology Note      Dermatology Problem List:  1. NUB, right chest DDx: pyogenic granuloma vs AMM  -s/p biopsy 11/15/2018    Encounter Date: Nov 15, 2018    CC:  Chief Complaint   Patient presents with     Spot check     of spot on right chest. No personal or family hx of SC.         History of Present Illness:  Ms. Mary Torres is a 28 year old female who presents as a referral from Dr. Palacios fro an \"atypical mole\" on her chest. It started as a pink bump. It developed when she was pregnant. It got larger in size then had random episodes of bleeding that was very difficult to stop. Patient had her third baby girl in August, she is breast feeding but not exclusively. No other concerns addressed today.    Past Medical History:   Patient Active Problem List   Diagnosis     CARDIOVASCULAR SCREENING; LDL GOAL LESS THAN 160     Lymph node enlargement     Intermittent asthma     LTBI (latent tuberculosis infection)     Encounter for long-term (current) use of medications     Encounter for initial prescription of contraceptive pills     Past Medical History:   Diagnosis Date     Asthma, intermittent      Past Surgical History:   Procedure Laterality Date      SECTION  13    Failure to descend     ENT SURGERY      wisdom teeth       Social History:  Patient  reports that she has never smoked. She has never used smokeless tobacco. She reports that she does not drink alcohol or use illicit drugs. 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    Family History:  Family History   Problem Relation Age of Onset     Asthma Mother      Diabetes Father      Thyroid Disease Father      Cancer Paternal Grandmother      lung cancer     Asthma Maternal Grandmother      Asthma Sister      C.A.D. No family hx of      Hypertension No family hx of      Cerebrovascular Disease No family hx of      Breast Cancer No family " hx of      Cancer - colorectal No family hx of      Prostate Cancer No family hx of      Alcohol/Drug No family hx of      Allergies No family hx of      Alzheimer Disease No family hx of      Anesthesia Reaction No family hx of      Arthritis No family hx of      Blood Disease No family hx of      Cardiovascular No family hx of      Circulatory No family hx of      Congenital Anomalies No family hx of      Connective Tissue Disorder No family hx of      Depression No family hx of      Endocrine Disease No family hx of      Eye Disorder No family hx of      Genetic Disorder No family hx of      GASTROINTESTINAL DISEASE No family hx of      Genitourinary Problems No family hx of      Gynecology No family hx of      HEART DISEASE No family hx of      Lipids No family hx of      Musculoskeletal Disorder No family hx of      Neurologic Disorder No family hx of      Obesity No family hx of      Osteoporosis No family hx of      Psychotic Disorder No family hx of      Respiratory No family hx of      Family History Negative No family hx of      Hearing Loss No family hx of        Medications:  Current Outpatient Prescriptions   Medication Sig Dispense Refill     albuterol (PROAIR HFA, PROVENTIL HFA, VENTOLIN HFA) 108 (90 BASE) MCG/ACT inhaler Inhale 2 puffs into the lungs every 6 hours as needed for shortness of breath / dyspnea 1 Inhaler 3     FLOVENT  MCG/ACT inhaler INHALE 2 PUFFS INTO THE LUNGS 2 TIMES DAILY 3 Inhaler 1     norethindrone (MICRONOR) 0.35 MG per tablet Take 1 tablet (0.35 mg) by mouth daily 84 tablet 3     Prenatal MV-Min-Fe Fum-FA-DHA (PRENATAL 1 PO)          No Known Allergies    Review of Systems:  -Constitutional: Patient is otherwise feeling well, in usual state of health.   -Skin: As above in HPI. No additional skin concerns.    Physical exam:  Vitals: LMP 11/27/2017 (Exact Date)  GEN: This is a well developed, well-nourished female in no acute distress, in a pleasant mood.    SKIN: Focused  examination of the face, neck, chest, and upper extremities was performed.  -3 mm pink papule with collarette of scale, inferior to the right clavicle  -No other lesions of concern on areas examined.           Impression/Plan:  1. 3 mm pink papule with collarette of scale, right chest. Neoplasm of uncertain behavior. Ddx: pyogenic granuloma vs AMM. Favor pyogenic granuloma. Discussed that pyogenic granulomas are common in children and pregnant women. Discussed that the best treatment is shave biopsy for diagnosis and removal. Discussed unknown risk of using lidocaine/epinephrine while breastfeeding of passing this medication into the breast milk. Expert opinion that if present in breast milk, it is at low concentrations that should not have an effect on the baby. After discussion of risks and benefits, patient wished to proceed with biopsy for diagnosis/treatment today. She will plan to pump breast milk and toss for the next day to lower risk to her baby.     Shave biopsy:  After discussion of benefits and risks including but not limited to bleeding/bruising, pain/swelling, infection, scar, incomplete removal, nerve damage/numbness, recurrence, and non-diagnostic biopsy, written consent, verbal consent and photographs were obtained. Time-out was performed. The area was cleaned with isopropyl alcohol.  was injected to obtain adequate anesthesia of the lesion on the right chest. 0.5ml of 1% lidocaine with 1:100,000 epinephrine was injected to obtain adequate anesthesia. A  shave biopsy was performed. Hemostasis was achieved with cautery. Vaseline and a sterile dressing were applied. The patient tolerated the procedure and no complications were noted. The patient was provided with verbal and written post care instructions.     Follow-up prn.     Staff Involved:  Scribe/Staff    Scribe Disclosure  I, Magali Draper am serving as a scribe to document services personally performed by Dr. Katharina Lemus MD, based on  data collection and the provider's statements to me.     Provider Disclosure:   The documentation recorded by the scribe accurately reflects the services I personally performed and the decisions made by me.    Katharina Lemus MD    Department of Dermatology  [facility] [place]: Phone: [phone], Fax:[phone]  [facility]: Phone: [phone], Fax: [phone]

## 2018-11-21 LAB — COPATH REPORT: NORMAL

## 2019-01-21 ENCOUNTER — MYC REFILL (OUTPATIENT)
Dept: FAMILY MEDICINE | Facility: OTHER | Age: 29
End: 2019-01-21

## 2019-01-21 DIAGNOSIS — J45.20 INTERMITTENT ASTHMA, UNSPECIFIED ASTHMA SEVERITY, UNSPECIFIED WHETHER COMPLICATED: ICD-10-CM

## 2019-01-23 RX ORDER — ALBUTEROL SULFATE 90 UG/1
2 AEROSOL, METERED RESPIRATORY (INHALATION) EVERY 6 HOURS PRN
Qty: 1 INHALER | Refills: 3 | OUTPATIENT
Start: 2019-01-23

## 2019-01-23 NOTE — TELEPHONE ENCOUNTER
"Requested Prescriptions   Pending Prescriptions Disp Refills     albuterol (PROAIR HFA/PROVENTIL HFA/VENTOLIN HFA) 108 (90 Base) MCG/ACT inhaler 1 Inhaler 3     Sig: Inhale 2 puffs into the lungs every 6 hours as needed for shortness of breath / dyspnea    Asthma Maintenance Inhalers - Anticholinergics Failed - 1/21/2019  8:27 AM       Failed - Asthma control assessment score within normal limits in last 6 months    Please review ACT score.   ACT Total Scores 11/10/2015   ACT TOTAL SCORE -   ASTHMA ER VISITS -   ASTHMA HOSPITALIZATIONS -   ACT TOTAL SCORE (Goal Greater than or Equal to 20) 22   In the past 12 months, how many times did you visit the emergency room for your asthma without being admitted to the hospital? 0   In the past 12 months, how many times were you hospitalized overnight because of your asthma? 0          Failed - Recent (6 mo) or future (30 days) visit within the authorizing provider's specialty    Patient had office visit in the last 6 months or has a visit in the next 30 days with authorizing provider or within the authorizing provider's specialty.  See \"Patient Info\" tab in inbasket, or \"Choose Columns\" in Meds & Orders section of the refill encounter.           Passed - Patient is age 12 years or older       Passed - Medication is active on med list        albuterol (PROAIR HFA, PROVENTIL HFA, VENTOLIN HFA) 108 (90 BASE) MCG/ACT inhaler  Routing refill request to provider for review/approval because:  A break in medication, last prescribed 01/06/2015    Ana Browning RN, BSN           "

## 2019-01-29 ENCOUNTER — OFFICE VISIT (OUTPATIENT)
Dept: FAMILY MEDICINE | Facility: CLINIC | Age: 29
End: 2019-01-29
Payer: COMMERCIAL

## 2019-01-29 VITALS
HEART RATE: 94 BPM | RESPIRATION RATE: 18 BRPM | TEMPERATURE: 98.2 F | DIASTOLIC BLOOD PRESSURE: 70 MMHG | SYSTOLIC BLOOD PRESSURE: 110 MMHG | OXYGEN SATURATION: 98 % | WEIGHT: 200.3 LBS | BODY MASS INDEX: 35.48 KG/M2

## 2019-01-29 DIAGNOSIS — J45.21 INTERMITTENT ASTHMA WITH ACUTE EXACERBATION, UNSPECIFIED ASTHMA SEVERITY: Primary | ICD-10-CM

## 2019-01-29 PROCEDURE — 99214 OFFICE O/P EST MOD 30 MIN: CPT | Performed by: PHYSICIAN ASSISTANT

## 2019-01-29 RX ORDER — ALBUTEROL SULFATE 0.83 MG/ML
2.5 SOLUTION RESPIRATORY (INHALATION) EVERY 6 HOURS PRN
Qty: 1 BOX | Refills: 1 | Status: SHIPPED | OUTPATIENT
Start: 2019-01-29 | End: 2020-04-06

## 2019-01-29 RX ORDER — METHYLPREDNISOLONE 4 MG
TABLET, DOSE PACK ORAL
Qty: 21 TABLET | Refills: 0 | Status: SHIPPED | OUTPATIENT
Start: 2019-01-29 | End: 2019-02-18

## 2019-01-29 RX ORDER — ALBUTEROL SULFATE 90 UG/1
2 AEROSOL, METERED RESPIRATORY (INHALATION) EVERY 6 HOURS PRN
Qty: 1 INHALER | Refills: 3 | Status: SHIPPED | OUTPATIENT
Start: 2019-01-29 | End: 2021-01-13

## 2019-01-29 ASSESSMENT — PAIN SCALES - GENERAL: PAINLEVEL: NO PAIN (0)

## 2019-01-29 NOTE — PATIENT INSTRUCTIONS
I will refill inhaler and nebs and treat asthma flare with a medrol pack. If worsening or not improving follow up in clinic or call.

## 2019-01-29 NOTE — PROGRESS NOTES
SUBJECTIVE:   Mary Torres is a 28 year old female who presents to clinic today for the following health issues:    HPI  Asthma Follow-Up    Was ACT completed today?    Yes    ACT Total Scores 1/29/2019   ACT TOTAL SCORE -   ASTHMA ER VISITS -   ASTHMA HOSPITALIZATIONS -   ACT TOTAL SCORE (Goal Greater than or Equal to 20) 10   In the past 12 months, how many times did you visit the emergency room for your asthma without being admitted to the hospital? 0   In the past 12 months, how many times were you hospitalized overnight because of your asthma? 0       Recent asthma triggers that patient is dealing with: cold air    Patient is here in clinic due to asthma exacerbation she has been having for the last 2 weeks. She has not had fevers or chills, she has not had a productive cough or sinus symptoms. She has been using her albuterol inhaler but remains wheezy and tight and is also due for a refill.     Problem list and histories reviewed & adjusted, as indicated.  Additional history: as documented    BP Readings from Last 3 Encounters:   01/29/19 110/70   10/05/18 110/70   08/13/18 120/60    Wt Readings from Last 3 Encounters:   01/29/19 90.9 kg (200 lb 4.8 oz)   10/05/18 90.8 kg (200 lb 4 oz)   08/13/18 98.9 kg (218 lb)         ROS:  Constitutional, HEENT, cardiovascular, pulmonary, gi and gu systems are negative, except as otherwise noted.    OBJECTIVE:     /70   Pulse 94   Temp 98.2  F (36.8  C) (Temporal)   Resp 18   Wt 90.9 kg (200 lb 4.8 oz)   SpO2 98%   BMI 35.48 kg/m    Body mass index is 35.48 kg/m .  GENERAL: alert and no distress  HENT: normal cephalic/atraumatic, nose and mouth without ulcers or lesions, oropharynx clear and oral mucous membranes moist  NECK: no adenopathy, no asymmetry, masses, or scars and thyroid normal to palpation  RESP: expiratory wheezes bilateral  CV: regular rates and rhythm, no murmur, click or rub, peripheral pulses strong and no peripheral edema  MS: no gross  musculoskeletal defects noted, no edema    Diagnostic Test Results:  none     ASSESSMENT/PLAN:     Asthma: Intermittent with exacerbation   Plan:  See Patient Instructions        ICD-10-CM    1. Intermittent asthma with acute exacerbation, unspecified asthma severity J45.21 albuterol (PROAIR HFA/PROVENTIL HFA/VENTOLIN HFA) 108 (90 Base) MCG/ACT inhaler     albuterol (PROVENTIL) (2.5 MG/3ML) 0.083% neb solution     methylPREDNISolone (MEDROL DOSEPAK) 4 MG tablet therapy pack       I will refill inhaler and neb and will treat for exacerbation with a medrol pack. Follow up in clinic as needed if persisting or worsening.   See Patient Instructions    Nya Rosenberg PA-C  [facility]

## 2019-01-30 ASSESSMENT — ASTHMA QUESTIONNAIRES: ACT_TOTALSCORE: 10

## 2019-02-18 ENCOUNTER — OFFICE VISIT (OUTPATIENT)
Dept: FAMILY MEDICINE | Facility: CLINIC | Age: 29
End: 2019-02-18
Payer: COMMERCIAL

## 2019-02-18 ENCOUNTER — ANCILLARY PROCEDURE (OUTPATIENT)
Dept: GENERAL RADIOLOGY | Facility: CLINIC | Age: 29
End: 2019-02-18
Attending: PHYSICIAN ASSISTANT
Payer: COMMERCIAL

## 2019-02-18 VITALS
RESPIRATION RATE: 18 BRPM | HEART RATE: 84 BPM | BODY MASS INDEX: 34.45 KG/M2 | TEMPERATURE: 98.5 F | OXYGEN SATURATION: 96 % | DIASTOLIC BLOOD PRESSURE: 70 MMHG | WEIGHT: 194.4 LBS | HEIGHT: 63 IN | SYSTOLIC BLOOD PRESSURE: 112 MMHG

## 2019-02-18 DIAGNOSIS — R06.02 SOB (SHORTNESS OF BREATH): ICD-10-CM

## 2019-02-18 DIAGNOSIS — R06.2 WHEEZING: ICD-10-CM

## 2019-02-18 DIAGNOSIS — J45.31 MILD PERSISTENT ASTHMA WITH ACUTE EXACERBATION: Primary | ICD-10-CM

## 2019-02-18 PROCEDURE — 99214 OFFICE O/P EST MOD 30 MIN: CPT | Performed by: PHYSICIAN ASSISTANT

## 2019-02-18 PROCEDURE — 71046 X-RAY EXAM CHEST 2 VIEWS: CPT | Mod: FY

## 2019-02-18 PROCEDURE — 94640 AIRWAY INHALATION TREATMENT: CPT | Performed by: PHYSICIAN ASSISTANT

## 2019-02-18 RX ORDER — PREDNISONE 20 MG/1
40 TABLET ORAL DAILY
Qty: 10 TABLET | Refills: 0 | Status: SHIPPED | OUTPATIENT
Start: 2019-02-18 | End: 2019-03-11

## 2019-02-18 RX ORDER — ALBUTEROL SULFATE 0.83 MG/ML
2.5 SOLUTION RESPIRATORY (INHALATION) ONCE
Status: COMPLETED | OUTPATIENT
Start: 2019-02-18 | End: 2019-02-18

## 2019-02-18 RX ORDER — FLUTICASONE PROPIONATE 110 UG/1
2 AEROSOL, METERED RESPIRATORY (INHALATION) 2 TIMES DAILY
Qty: 1 INHALER | Refills: 5 | Status: SHIPPED | OUTPATIENT
Start: 2019-02-18 | End: 2019-05-28

## 2019-02-18 RX ORDER — AZITHROMYCIN 250 MG/1
TABLET, FILM COATED ORAL
Qty: 6 TABLET | Refills: 0 | Status: SHIPPED | OUTPATIENT
Start: 2019-02-18 | End: 2019-03-11

## 2019-02-18 RX ADMIN — ALBUTEROL SULFATE 2.5 MG: 0.83 SOLUTION RESPIRATORY (INHALATION) at 15:49

## 2019-02-18 ASSESSMENT — PAIN SCALES - GENERAL: PAINLEVEL: NO PAIN (0)

## 2019-02-18 ASSESSMENT — MIFFLIN-ST. JEOR: SCORE: 1582.66

## 2019-02-18 NOTE — PATIENT INSTRUCTIONS
Please neb (or inhaler) every 4 hrs on a schedule for a few days    Start zithromax  Prednisone 40mg daily for 5 days (take with food)    Start flovent 2 puffs twice daily- stay on for the next 4-6 weeks. Longer if needed    Follow up in 2 weeks for recheck     You were prescribed a steroid. This is a strong anti-inflammatory.   Primary side effects can include insomnia (trouble with sleep), increased appetite, and irritability/moodiness.  By dosing this medication earlier in the day (taking in the morning and/or at lunch) can help reduce the sleep side effects.    Do not take NSAIDs while taking prednisone (examples of NSAIDs: ibuprofen, Advil, Aleve, naproxen, diclofenac, celebrex, etc).

## 2019-02-18 NOTE — PROGRESS NOTES
"  SUBJECTIVE:   Mary Torres is a 28 year old female who presents to clinic today for the following health issues:      Asthma     Asthma:     Cough::  YES    Wheezing::  YES    Dyspnea::  YES    Use of rescue inhaler::  Q4 hr.    Taking Asthma medication as prescribed::  NO    Asthma triggers::  Cold air    ER/UC Visits or Admissions::  None    Diet:  Regular (no restrictions)  Frequency of exercise:  4-5 days/week  Duration of exercise:  15-30 minutes  Taking medications regularly:  Yes  Medication side effects:  Not applicable  Additional concerns today:  No  History of Present Illness     Asthma:     Cough::  YES    Wheezing::  YES    Dyspnea::  YES    Use of rescue inhaler::  Q4 hr.    Taking Asthma medication as prescribed::  NO    Asthma triggers::  Cold air    ER/UC Visits or Admissions::  None    Diet:  Regular (no restrictions)  Frequency of exercise:  4-5 days/week  Duration of exercise:  15-30 minutes  Taking medications regularly:  Yes  Medication side effects:  Not applicable  Additional concerns today:  No    Majority of year asthma does not bother her. \"I can go about my business and feel fine\".   Trigger: exercise, hot/humidity, URI sx.     Family had URI sx \"passing around\" early Jan 2019. Was using albuterol a few times per week. Has not used inhaled steroids in long time.   Was seen here 01/29/19 for asthma exacerbation. Treated with medrol dose pack and refilled steroids. Traveled to [place] for vacation while on steroids. Felt like she got \"a little bit better\". Sx that improved- less SOB. \"not bothering me at all\", except when running on beach- got a little SOB. \"not quite back to normal\"    Has been done with steroid pack about 2 weeks. Sx slowly got worse after off steroids. Was fine for awhile, got another cold and now \"really bad again\". Over weekend inhaler was not helping. Using neb 2x per day and was helping some. Waking at night. Sleeping propped up.   Coughing fits. Cough " productive light pink or yellow in am. Daytime dry cough. Feels wheezy. Chest tight. Not painful.   Little bit painful to breath.    No fevers.   No leg swelling.   No hx of clot. Nonsmoker.     6mo post-partum. Progestin only minipill. Breastfeeding.       Problem list and histories reviewed & adjusted, as indicated.  Additional history: as documented      Patient Active Problem List   Diagnosis     CARDIOVASCULAR SCREENING; LDL GOAL LESS THAN 160     Lymph node enlargement     Intermittent asthma     LTBI (latent tuberculosis infection)     Encounter for long-term (current) use of medications     Encounter for initial prescription of contraceptive pills     Past Surgical History:   Procedure Laterality Date      SECTION  13    Failure to descend     ENT SURGERY      wisdom teeth       Social History     Tobacco Use     Smoking status: Never Smoker     Smokeless tobacco: Never Used   Substance Use Topics     Alcohol use: No     Family History   Problem Relation Age of Onset     Asthma Mother      Diabetes Father      Thyroid Disease Father      Cancer Paternal Grandmother         lung cancer     Asthma Maternal Grandmother      Asthma Sister      C.A.D. No family hx of      Hypertension No family hx of      Cerebrovascular Disease No family hx of      Breast Cancer No family hx of      Cancer - colorectal No family hx of      Prostate Cancer No family hx of      Alcohol/Drug No family hx of      Allergies No family hx of      Alzheimer Disease No family hx of      Anesthesia Reaction No family hx of      Arthritis No family hx of      Blood Disease No family hx of      Cardiovascular No family hx of      Circulatory No family hx of      Congenital Anomalies No family hx of      Connective Tissue Disorder No family hx of      Depression No family hx of      Endocrine Disease No family hx of      Eye Disorder No family hx of      Genetic Disorder No family hx of      Gastrointestinal Disease No family  "hx of      Genitourinary Problems No family hx of      Gynecology No family hx of      Heart Disease No family hx of      Lipids No family hx of      Musculoskeletal Disorder No family hx of      Neurologic Disorder No family hx of      Obesity No family hx of      Osteoporosis No family hx of      Psychotic Disorder No family hx of      Respiratory No family hx of      Family History Negative No family hx of      Hearing Loss No family hx of      Skin Cancer No family hx of          Current Outpatient Medications   Medication Sig Dispense Refill     albuterol (PROAIR HFA/PROVENTIL HFA/VENTOLIN HFA) 108 (90 Base) MCG/ACT inhaler Inhale 2 puffs into the lungs every 6 hours as needed for shortness of breath / dyspnea 1 Inhaler 3     albuterol (PROVENTIL) (2.5 MG/3ML) 0.083% neb solution Take 1 vial (2.5 mg) by nebulization every 6 hours as needed for shortness of breath / dyspnea or wheezing 1 Box 1     norethindrone (MICRONOR) 0.35 MG per tablet Take 1 tablet (0.35 mg) by mouth daily 84 tablet 3     Prenatal MV-Min-Fe Fum-FA-DHA (PRENATAL 1 PO)        FLOVENT  MCG/ACT inhaler INHALE 2 PUFFS INTO THE LUNGS 2 TIMES DAILY (Patient not taking: Reported on 1/29/2019) 3 Inhaler 1     No Known Allergies  BP Readings from Last 3 Encounters:   02/18/19 112/70   01/29/19 110/70   10/05/18 110/70    Wt Readings from Last 3 Encounters:   02/18/19 88.2 kg (194 lb 6.4 oz)   01/29/19 90.9 kg (200 lb 4.8 oz)   10/05/18 90.8 kg (200 lb 4 oz)                  Labs reviewed in EPIC    ROS:  Constitutional, HEENT, cardiovascular, pulmonary, gi and gu systems are negative, except as otherwise noted.    OBJECTIVE:     /70   Pulse 84   Temp 98.5  F (36.9  C) (Temporal)   Resp 18   Ht 1.595 m (5' 2.8\")   Wt 88.2 kg (194 lb 6.4 oz)   SpO2 96%   BMI 34.66 kg/m    Body mass index is 34.66 kg/m .   GENERAL:well appearing, audibly wheezing, alert and no distress, nontoxic, no respiratory distress  EYES: Eyes grossly normal to " "inspection, PERRL and conjunctivae and sclerae normal  HENT: Normal cephalic/atraumatic. Ear canals clear and TM's normal, no effusion. Nose mucosa no erythema and turbinates normal. Lips normal, no lesions. Buccal muscosa moist. Soft palate no lesions or ulcers. Tonsilar archs no erythema, tonsils normal, no exudates or erythema. Uvula midline. Posterior oropharynx normal erythema.   NECK: no adenopathy and no asymmetry, masses, or scars  RESP: audibly wheezing, increased RR; diffusely wheezy through out.   CV: regular rate and rhythm, normal S1 S2, no S3 or S4, no murmur, click or rub  MSK: no LE edema, calf tenderness, normal pedal pulses  SKIN: no suspicious lesions or rashes      Diagnostic Test Results:  Xray -   Xr Chest 2 Views    Result Date: 2/18/2019  XR CHEST 2 VW 2/18/2019 4:03 PM HISTORY: Wheezing, short of breath. COMPARISON: None. FINDINGS: No airspace consolidation, pleural effusion or pneumothorax. Normal heart size.     IMPRESSION: No acute cardiopulmonary abnormality. NICO LANGE MD    Clinical course:  Albuterol neb given.  Pt reported \"I feel much better\", post-neb.   Post-neb exam: nearly full resolution of wheezing, soft wheezing in bases.       ASSESSMENT/PLAN:     1. Mild persistent asthma with acute exacerbation  Asthma exacerbation treated 2-3 weeks ago with improvement, not back to baseline  Pt had good improvement post-neb in clinic.   Restart steroid inhaler for next 4-6 weeks, longer if needed  Prednisone burst  zithromax to cover for atypicals with persisting sx after first round of prednisone  Use neb on schedule next few days.   AAP  - fluticasone (FLOVENT HFA) 110 MCG/ACT inhaler; Inhale 2 puffs into the lungs 2 times daily  Dispense: 1 Inhaler; Refill: 5  - azithromycin (ZITHROMAX) 250 MG tablet; Two tablets first day, then one tablet daily for four days.  Dispense: 6 tablet; Refill: 0  - predniSONE (DELTASONE) 20 MG tablet; Take 40 mg by mouth daily.  Dispense: 10 tablet; " Refill: 0  - Asthma Action Plan (AAP)    2. Wheezing  - albuterol (PROVENTIL) neb solution 2.5 mg; Take 3 mLs (2.5 mg) by nebulization once  - XR Chest 2 Views; Future    3. SOB (shortness of breath)  - albuterol (PROVENTIL) neb solution 2.5 mg; Take 3 mLs (2.5 mg) by nebulization once  - XR Chest 2 Views; Future    Follow Up: recheck 2 weeks  The patient was instructed to contact clinic for worsening symptoms, non-improvement as expected/discussed, and for questions regarding medications or treatment plan. Discussed parameters for follow up and included in After Visit Summary given to patient.      Diana Georges PA-C  [facility]

## 2019-02-18 NOTE — LETTER
My Asthma Action Plan  Name: Mary Torres   YOB: 1990  Date: 2/18/2019   My doctor: Diana Georges PA-C   My clinic: [facility]        My Control Medicine: Fluticasone propionate (Flovent) -   mcg 2 puffs twice daily  My Rescue Medicine: Albuterol nebulizer solution 1 treatment every 6 hrs  Albuterol (Proair/Ventolin/Proventil) inhaler 2 puffs every 4-6hrs   My Asthma Severity: mild persistent  Avoid your asthma triggers: upper respiratory infections, humidity and exercise or sports  cold air            GREEN ZONE   Good Control    I feel good    No cough or wheeze    Can work, sleep and play without asthma symptoms       Take your asthma control medicine every day.     1. If exercise triggers your asthma, take your rescue medication    15 minutes before exercise or sports, and    During exercise if you have asthma symptoms  2. Spacer to use with inhaler: If you have a spacer, make sure to use it with your inhaler             YELLOW ZONE Getting Worse  I have ANY of these:    I do not feel good    Cough or wheeze    Chest feels tight    Wake up at night   1. Keep taking your Green Zone medications  2. Start taking your rescue medicine:    every 20 minutes for up to 1 hour. Then every 4 hours for 24-48 hours.  3. If you stay in the Yellow Zone for more than 12-24 hours, contact your doctor.  4. If you do not return to the Green Zone in 12-24 hours or you get worse, start taking your oral steroid medicine if prescribed by your provider.           RED ZONE Medical Alert - Get Help  I have ANY of these:    I feel awful    Medicine is not helping    Breathing getting harder    Trouble walking or talking    Nose opens wide to breathe       1. Take your rescue medicine NOW  2. If your provider has prescribed an oral steroid medicine, start taking it NOW  3. Call your doctor NOW  4. If you are still in the Red Zone after 20 minutes and you have not reached your doctor:    Take your  rescue medicine again and    Call 911 or go to the emergency room right away    See your regular doctor within 2 weeks of an Emergency Room or Urgent Care visit for follow-up treatment.          Annual Reminders:  Meet with Asthma Educator,  Flu Shot in the Fall, consider Pneumonia Vaccination for patients with asthma (aged 19 and older).    Pharmacy:    [id number] - [place], [place] - [address]  [facility] [id number] [place] [facility] [place], [place] - [address]                      Asthma Triggers  How To Control Things That Make Your Asthma Worse    Triggers are things that make your asthma worse.  Look at the list below to help you find your triggers and what you can do about them.  You can help prevent asthma flare-ups by staying away from your triggers.      Trigger                                                          What you can do   Cigarette Smoke  Tobacco smoke can make asthma worse. Do not allow smoking in your home, car or around you.  Be sure no one smokes at a child s day care or school.  If you smoke, ask your health care provider for ways to help you quit.  Ask family members to quit too.  Ask your health care provider for a referral to Quit Plan to help you quit smoking, or call 4-784-887-PLAN.     Colds, Flu, Bronchitis  These are common triggers of asthma. Wash your hands often.  Don t touch your eyes, nose or mouth.  Get a flu shot every year.     Dust Mites  These are tiny bugs that live in cloth or carpet. They are too small to see. Wash sheets and blankets in hot water every week.   Encase pillows and mattress in dust mite proof covers.  Avoid having carpet if you can. If you have carpet, vacuum weekly.   Use a dust mask and HEPA vacuum.   Pollen and Outdoor Mold  Some people are allergic to trees, grass, or weed pollen, or molds. Try to keep your windows closed.  Limit time out doors when pollen count is high.   Ask you health care provider about taking medicine during allergy  season.     Animal Dander  Some people are allergic to skin flakes, urine or saliva from pets with fur or feathers. Keep pets with fur or feathers out of your home.    If you can t keep the pet outdoors, then keep the pet out of your bedroom.  Keep the bedroom door closed.  Keep pets off cloth furniture and away from stuffed toys.     Mice, Rats, and Cockroaches  Some people are allergic to the waste from these pests.   Cover food and garbage.  Clean up spills and food crumbs.  Store grease in the refrigerator.   Keep food out of the bedroom.   Indoor Mold  This can be a trigger if your home has high moisture. Fix leaking faucets, pipes, or other sources of water.   Clean moldy surfaces.  Dehumidify basement if it is damp and smelly.   Smoke, Strong Odors, and Sprays  These can reduce air quality. Stay away from strong odors and sprays, such as perfume, powder, hair spray, paints, smoke incense, paint, cleaning products, candles and new carpet.   Exercise or Sports  Some people with asthma have this trigger. Be active!  Ask your doctor about taking medicine before sports or exercise to prevent symptoms.    Warm up for 5-10 minutes before and after sports or exercise.     Other Triggers of Asthma  Cold air:  Cover your nose and mouth with a scarf.  Sometimes laughing or crying can be a trigger.  Some medicines and food can trigger asthma.

## 2019-02-18 NOTE — PROGRESS NOTES
The following nebulizer treatment was given:     MEDICATION: Albuterol Sulfate 2.5 mg  : [employer]  LOT #: [id number]  EXPIRATION DATE:  03/2019  NDC # 5367-6870-96     Nebulizer Start Time:  349    See Vital Signs Flowsheet

## 2019-02-19 ASSESSMENT — ASTHMA QUESTIONNAIRES: ACT_TOTALSCORE: 7

## 2019-03-04 ENCOUNTER — TELEPHONE (OUTPATIENT)
Dept: FAMILY MEDICINE | Facility: CLINIC | Age: 29
End: 2019-03-04

## 2019-03-04 NOTE — TELEPHONE ENCOUNTER
Summary:    Patient is due/failing the following:   Asthma follow up and ACT    Action needed:   Patient needs to do ACT.    Type of outreach:    Phone, spoke to patient.  patient scheduled OV    Questions for provider review:    None                                                                                                                                    Janet Salvador       Chart routed to Care Team .      Panel Management Review      Patient has the following on her problem list:   Asthma review     ACT Total Scores 2/18/2019   ACT TOTAL SCORE -   ASTHMA ER VISITS -   ASTHMA HOSPITALIZATIONS -   ACT TOTAL SCORE (Goal Greater than or Equal to 20) 7   In the past 12 months, how many times did you visit the emergency room for your asthma without being admitted to the hospital? 0   In the past 12 months, how many times were you hospitalized overnight because of your asthma? 0      1. Is Asthma diagnosis on the Problem List? Yes    2. Is Asthma listed on Health Maintenance? Yes    3. Patient is due for:  ACT      Composite cancer screening  Chart review shows that this patient is due/due soon for the following None

## 2019-03-04 NOTE — PROGRESS NOTES
"  SUBJECTIVE:   Mary Torres is a 28 year old female who presents to clinic today for the following health issues:      History of Present Illness     Asthma:     Cough::  No    Wheezing::  No    Dyspnea::  No    Use of rescue inhaler::  Twice a week    Taking Asthma medication as prescribed::  Yes    Asthma triggers::  Same as previous visit    ER/UC Visits or Admissions::  None    Diet:  Regular (no restrictions)  Frequency of exercise:  6-7 days/week  Duration of exercise:  15-30 minutes  Taking medications regularly:  Yes  Medication side effects:  None  Additional concerns today:  No    Asthma  \"I feel way better.\"  Last visit (1 mo ago) was rxed zithromax, more steroids and flovent.   Was on albuterol on schedule for a week, then realized she wasn't needing it. So stopped.  Has been using the flovent - twice daily. Thinks that is keeping sx controlled.  Able to work out again and shovel snow. Did use albuterol after shoveling and it helped.  No longer coughing on regular basis. No longer coughing up mucus.   No wheeze.  No SOB.   No more nighttime cough.  \"Its night and day difference\".  has noticed and commented how much better she is.    Triggers for asthma flares: flares with \"really hot and really cold weather\" and URIs.       Problem list and histories reviewed & adjusted, as indicated.  Additional history: as documented      Patient Active Problem List   Diagnosis     CARDIOVASCULAR SCREENING; LDL GOAL LESS THAN 160     Lymph node enlargement     Intermittent asthma     LTBI (latent tuberculosis infection)     Encounter for long-term (current) use of medications     Encounter for initial prescription of contraceptive pills     Past Surgical History:   Procedure Laterality Date      SECTION  13    Failure to descend     ENT SURGERY      wisdom teeth       Social History     Tobacco Use     Smoking status: Never Smoker     Smokeless tobacco: Never Used   Substance Use Topics     " Alcohol use: No     Family History   Problem Relation Age of Onset     Asthma Mother      Diabetes Father      Thyroid Disease Father      Cancer Paternal Grandmother         lung cancer     Asthma Maternal Grandmother      Asthma Sister      C.A.D. No family hx of      Hypertension No family hx of      Cerebrovascular Disease No family hx of      Breast Cancer No family hx of      Cancer - colorectal No family hx of      Prostate Cancer No family hx of      Alcohol/Drug No family hx of      Allergies No family hx of      Alzheimer Disease No family hx of      Anesthesia Reaction No family hx of      Arthritis No family hx of      Blood Disease No family hx of      Cardiovascular No family hx of      Circulatory No family hx of      Congenital Anomalies No family hx of      Connective Tissue Disorder No family hx of      Depression No family hx of      Endocrine Disease No family hx of      Eye Disorder No family hx of      Genetic Disorder No family hx of      Gastrointestinal Disease No family hx of      Genitourinary Problems No family hx of      Gynecology No family hx of      Heart Disease No family hx of      Lipids No family hx of      Musculoskeletal Disorder No family hx of      Neurologic Disorder No family hx of      Obesity No family hx of      Osteoporosis No family hx of      Psychotic Disorder No family hx of      Respiratory No family hx of      Family History Negative No family hx of      Hearing Loss No family hx of      Skin Cancer No family hx of          Current Outpatient Medications   Medication Sig Dispense Refill     albuterol (PROAIR HFA/PROVENTIL HFA/VENTOLIN HFA) 108 (90 Base) MCG/ACT inhaler Inhale 2 puffs into the lungs every 6 hours as needed for shortness of breath / dyspnea 1 Inhaler 3     albuterol (PROVENTIL) (2.5 MG/3ML) 0.083% neb solution Take 1 vial (2.5 mg) by nebulization every 6 hours as needed for shortness of breath / dyspnea or wheezing 1 Box 1     fluticasone (FLOVENT  "HFA) 110 MCG/ACT inhaler Inhale 2 puffs into the lungs 2 times daily 1 Inhaler 5     norethindrone (MICRONOR) 0.35 MG per tablet Take 1 tablet (0.35 mg) by mouth daily 84 tablet 3     Prenatal MV-Min-Fe Fum-FA-DHA (PRENATAL 1 PO)        No Known Allergies  BP Readings from Last 3 Encounters:   03/11/19 102/68   02/18/19 112/70   01/29/19 110/70    Wt Readings from Last 3 Encounters:   03/11/19 88.4 kg (194 lb 14.4 oz)   02/18/19 88.2 kg (194 lb 6.4 oz)   01/29/19 90.9 kg (200 lb 4.8 oz)                  Labs reviewed in EPIC    ROS:  Constitutional, HEENT, cardiovascular, pulmonary, gi and gu systems are negative, except as otherwise noted.    OBJECTIVE:     /68   Pulse 85   Temp 97.8  F (36.6  C) (Oral)   Resp 18   Ht 1.595 m (5' 2.8\")   Wt 88.4 kg (194 lb 14.4 oz)   LMP  (LMP Unknown)   SpO2 96%   Breastfeeding? No   BMI 34.75 kg/m    Body mass index is 34.75 kg/m .  GENERAL: healthy, alert and no distress  EYES: Eyes grossly normal to inspection, PERRL and conjunctivae and sclerae normal  NECK: no adenopathy, no asymmetry, masses, or scars and thyroid normal to palpation  RESP: lungs clear to auscultation - no rales, rhonchi or wheezes  MS: no gross musculoskeletal defects noted, no edema  NEURO: Normal strength and tone, mentation intact and speech normal  PSYCH: mentation appears normal, affect normal/bright    Diagnostic Test Results:  none     ASSESSMENT/PLAN:     1. Mild intermittent asthma without complication  Exacerbation has resolved.   She is well controlled, no sx.   Typical triggers are severe cold weather and heat.  Continue flovent another few weeks, can try to step down to once daily on flovent for a few weeks. If doing well, can try to stop.  Close monitoring. If sx return at any time, need to go back up on dosing to maintain control.  If maintains of flovent, discussed starting back preventively with the hot/humid weather.   Albuterol prn.       Follow Up: The patient was " instructed to contact clinic for worsening symptoms, non-improvement as expected/discussed, and for questions regarding medications or treatment plan. Discussed parameters for follow up and included in After Visit Summary given to patient.      Diana Georges PA-C  [facility]

## 2019-03-11 ENCOUNTER — OFFICE VISIT (OUTPATIENT)
Dept: FAMILY MEDICINE | Facility: CLINIC | Age: 29
End: 2019-03-11
Payer: COMMERCIAL

## 2019-03-11 VITALS
TEMPERATURE: 97.8 F | DIASTOLIC BLOOD PRESSURE: 68 MMHG | RESPIRATION RATE: 18 BRPM | SYSTOLIC BLOOD PRESSURE: 102 MMHG | HEART RATE: 85 BPM | BODY MASS INDEX: 34.54 KG/M2 | WEIGHT: 194.9 LBS | HEIGHT: 63 IN | OXYGEN SATURATION: 96 %

## 2019-03-11 DIAGNOSIS — J45.20 MILD INTERMITTENT ASTHMA WITHOUT COMPLICATION: Primary | ICD-10-CM

## 2019-03-11 PROCEDURE — 99213 OFFICE O/P EST LOW 20 MIN: CPT | Performed by: PHYSICIAN ASSISTANT

## 2019-03-11 ASSESSMENT — MIFFLIN-ST. JEOR: SCORE: 1585.01

## 2019-03-11 ASSESSMENT — PAIN SCALES - GENERAL: PAINLEVEL: NO PAIN (0)

## 2019-03-11 NOTE — PATIENT INSTRUCTIONS
Use the flovent for 6 weeks. Then can try to reduce to once daily with flovent for a few weeks. If sx flare up- go back to twice daily. If doing well, may be able to stop.  Monitor closely- if sx return- cough, nighttime time, wheeze- then restart flovent.    May want to restart flovent during summer months with warm/humid weather.

## 2019-03-12 ASSESSMENT — ASTHMA QUESTIONNAIRES: ACT_TOTALSCORE: 20

## 2019-05-01 ENCOUNTER — MYC MEDICAL ADVICE (OUTPATIENT)
Dept: OBGYN | Facility: OTHER | Age: 29
End: 2019-05-01

## 2019-05-01 DIAGNOSIS — Z30.011 ENCOUNTER FOR INITIAL PRESCRIPTION OF CONTRACEPTIVE PILLS: Primary | ICD-10-CM

## 2019-05-01 NOTE — TELEPHONE ENCOUNTER
Patient has previously been on Ortho Cyclen- Sprintec.  Forwarded to provider to advise on prescription.  Pharmacy updated.  Patient last seen by Dr. Palacios for PPE on 10/5/18.

## 2019-05-02 RX ORDER — NORGESTIMATE AND ETHINYL ESTRADIOL 0.25-0.035
1 KIT ORAL DAILY
Qty: 90 TABLET | Refills: 3 | Status: SHIPPED | OUTPATIENT
Start: 2019-05-02 | End: 2019-07-22

## 2019-05-02 NOTE — TELEPHONE ENCOUNTER
Left detailed per saying that was okay, let message letting pt know her prescription has been sent to her pharmacy.    Mia Mcginnis RN on 5/2/2019 at 8:14 AM

## 2019-05-24 DIAGNOSIS — J45.31 MILD PERSISTENT ASTHMA WITH ACUTE EXACERBATION: ICD-10-CM

## 2019-05-24 RX ORDER — FLUTICASONE PROPIONATE 110 UG/1
2 AEROSOL, METERED RESPIRATORY (INHALATION) 2 TIMES DAILY
Qty: 1 INHALER | Refills: 5 | OUTPATIENT
Start: 2019-05-24

## 2019-05-28 RX ORDER — FLUTICASONE PROPIONATE 110 UG/1
2 AEROSOL, METERED RESPIRATORY (INHALATION) 2 TIMES DAILY
Qty: 3 INHALER | Refills: 1 | Status: SHIPPED | OUTPATIENT
Start: 2019-05-28 | End: 2021-01-13

## 2019-05-28 NOTE — TELEPHONE ENCOUNTER
[facility] faxed note requesting 90 days supply at a time.     Last refill sent was for quantity 1 with 5 refills.  Can we resend new Rx for quantity 3 with XX refills.

## 2019-07-22 ENCOUNTER — OFFICE VISIT (OUTPATIENT)
Dept: OBGYN | Facility: OTHER | Age: 29
End: 2019-07-22
Payer: COMMERCIAL

## 2019-07-22 VITALS
DIASTOLIC BLOOD PRESSURE: 72 MMHG | BODY MASS INDEX: 29.1 KG/M2 | WEIGHT: 163.25 LBS | SYSTOLIC BLOOD PRESSURE: 140 MMHG | HEART RATE: 60 BPM

## 2019-07-22 DIAGNOSIS — Z00.00 ANNUAL PHYSICAL EXAM: Primary | ICD-10-CM

## 2019-07-22 DIAGNOSIS — J45.20 MILD INTERMITTENT ASTHMA WITHOUT COMPLICATION: ICD-10-CM

## 2019-07-22 PROCEDURE — 99395 PREV VISIT EST AGE 18-39: CPT | Performed by: OBSTETRICS & GYNECOLOGY

## 2019-07-22 PROCEDURE — G0145 SCR C/V CYTO,THINLAYER,RESCR: HCPCS | Performed by: OBSTETRICS & GYNECOLOGY

## 2019-07-22 NOTE — PATIENT INSTRUCTIONS
PREVENTIVE HEALTH RECOMMENDATIONS:   Get a physical every year.  A pap test is important to have done starting at age 21 and then every three years as long as your pap is normal.  When you receive the results of your pap test it will include when you need to have your next pap test.    You should be tested each year for chlamydia and gonorrhea if you are aged 16-25 and if you have had a new sexual partner since you were last tested.   Vaccines: Get a flu shot each year.   Eat at least 8-10 servings of fruits and vegetables daily.  Eat whole-grain bread and cereal, whole-wheat pasta and brown rice instead of white grains and white rice.   For bone health: Eat calcium-rich foods (dairy products) or take calcium pills (500 to 600 mg with vitamin D) twice a day with food.   Exercise for an average of 30 minutes a day, 5 days of the week. It can be as simple as taking a brisk walk.  This will help you control your weight and prevent many diseases.   Limit alcohol to one drink per day.   Don't smoke and limit your exposure to second hand smoke.  If you smoke consider making a plan to quit. Go to [employer] and clink on   [employer]  for help   Wear sunscreen with at least SPF15 to prevent skin damage and skin cancer.   Brush your teeth twice a day and floss once a day and see your dentist twice a year for an exam and cleaning.   Have a great year and I will look forward to seeing you next year.   Jailyn Palacios, DO

## 2019-07-22 NOTE — PROGRESS NOTES
Chief Complaint   Patient presents with     Physical     AFE       Subjective  Mary Torres is a 29 year old female who presents for her annual exam.  Patient states she is doing well.  Her menses are regular, monthly.  Her last menstrual period is today.  She is wanting to have another baby so this will be her last cycle on the pill.  She has had 3 c section.  We discussed this in detail.  She bleeds for 4-5 days.  Patient uses tampons and rarely has to change them.  No problems urinating.  Normal bowel movements.  Patient is sexually active.  No dyspareunia.  No vaginal spotting after.       Most recent pap: 2016  History of abnormal Pap smear: No  History of STI's:  No  History of PID:  no    Family history of uterine cancer:  No  Family history of ovarian cancer:  No  Family history of colon cancer:  No  Family history of breast cancer:  No    ROS  ROS: 10 point ROS neg other than the symptoms noted above in the HPI.    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 No family hx of      Hypertension No family hx of      Cerebrovascular Disease No family hx of      Breast Cancer No family hx of      Cancer - colorectal No family hx of      Prostate Cancer No family hx of      Alcohol/Drug No family hx of      Allergies No family hx of      Alzheimer Disease No family hx of      Anesthesia Reaction No family hx of      Arthritis No family hx of      Blood Disease No family hx of      Cardiovascular No family hx of      Circulatory No family hx of      Congenital Anomalies No family hx of      Connective Tissue Disorder No  family hx of      Depression No family hx of      Endocrine Disease No family hx of      Eye Disorder No family hx of      Genetic Disorder No family hx of      Gastrointestinal Disease No family hx of      Genitourinary Problems No family hx of      Gynecology No family hx of      Heart Disease No family hx of      Lipids No family hx of      Musculoskeletal Disorder No family hx of      Neurologic Disorder No family hx of      Obesity No family hx of      Osteoporosis No family hx of      Psychotic Disorder No family hx of      Respiratory No family hx of      Family History Negative No family hx of      Hearing Loss No family hx of      Skin Cancer No family hx of      Social History     Tobacco Use     Smoking status: Never Smoker     Smokeless tobacco: Never Used   Substance Use Topics     Alcohol use: No       Tobacco abuse:  No  Do you get at least three servings of calcium containing foods daily (dairy, green leafy vegetables, etc.)? yes   Outside of work or daily activities, how many days per week do you exercise for 30 minutes or longer? 3-4x per week  The patient does not drink >3 drinks per day nor >7 drinks per week.   Have you had an eye exam in the past two years? yes   Do you see a dentist twice per year? yes   Today's PHQ-2 Score:   Abuse: Current or Past(Physical, Sexual or Emotional)- No   Do you feel safe in your environment - Yes  Objective  Vitals: /72   Pulse 60   Wt 74 kg (163 lb 4 oz)   LMP 07/22/2019 (Exact Date)   Breastfeeding? No   BMI 29.10 kg/m    BMI= Body mass index is 29.1 kg/m .    General appearance=well developed, well-nourished female  Psych=mood is stable, alert and oriented x3  Skin=no rashes or lesions seen  Breast:  Benign exam, no masses palpated.  No skin changes, no axillary lymphadenopathy, no nipple discharge.  Axilla feel completely normal, no lymph node enlargement and non-tender.  Neck=overall appearance is normal  Heart=RRR, no murmurs, no swelling  noted  Thyroid=normal, no masses, no TTP, no enlargement  Lungs=non-labored breathing, no use of accessory muscles, clear to ausculation bilaterally  Abd=soft, Nontender/nondistended, +bowel sounds x4, no masses, no signs of hernias, no evidence of hepatosplenomegaly  PELVIC:    External genitalia: normal without lesions or masses  Urethral meatus: no lesions or prolapse noted, normal size  Urethra: no masses, non tender  Bladder: non tender, no fullness  Vagina: normal mucosa and rugae, no discharge, minimal amount of blood in vaginal vault  Cervix: normal without lesion, no cervical motion tenderness, healthy, pap smear obtained  Uterus: small, mobile, nontender.  Adnexa: non tender, without masses  Rectal: deferred  Ext=no clubbing or cyanosis, no swelling      Last lipid profile: 2012  Regular self breast exam:  No  Most recent mammogram:  N/A  History of abnormal mammogram: N/A  Fit testing:  N/A  DEXA:  N/A        Assessment/Plan  1.)  Annual/well woman exam=CBC, CMP, lipids, pap smear  2.)  Asthma=stable      The following topics were discussed or recommended   Discussed seat belt, helmet and sunscreen use  Vision screening   Calcium/Vitamin D supplement=Recommended 1000 mg of calcium daily and 800 IU of vitamin D.    25 minutes was spent face to face with the patient today discussing her history, diagnosis, and follow-up plan as noted above.  Over 50% of the visit was spent in counseling and coordination of care.    Total Visit Time: 30 minutes        Jailyn Palacios

## 2019-07-25 LAB
COPATH REPORT: NORMAL
PAP: NORMAL

## 2019-10-14 ENCOUNTER — ALLIED HEALTH/NURSE VISIT (OUTPATIENT)
Dept: FAMILY MEDICINE | Facility: OTHER | Age: 29
End: 2019-10-14
Payer: COMMERCIAL

## 2019-10-14 VITALS — BODY MASS INDEX: 26.56 KG/M2 | WEIGHT: 149 LBS

## 2019-10-14 DIAGNOSIS — Z32.01 PREGNANCY EXAMINATION OR TEST, POSITIVE RESULT: Primary | ICD-10-CM

## 2019-10-14 DIAGNOSIS — N91.2 ABSENCE OF MENSTRUATION: Primary | ICD-10-CM

## 2019-10-14 DIAGNOSIS — J45.20 MILD INTERMITTENT ASTHMA WITHOUT COMPLICATION: ICD-10-CM

## 2019-10-14 DIAGNOSIS — Z00.00 ANNUAL PHYSICAL EXAM: ICD-10-CM

## 2019-10-14 LAB
ALBUMIN SERPL-MCNC: 3.7 G/DL (ref 3.4–5)
ALP SERPL-CCNC: 57 U/L (ref 40–150)
ALT SERPL W P-5'-P-CCNC: 26 U/L (ref 0–50)
ANION GAP SERPL CALCULATED.3IONS-SCNC: 4 MMOL/L (ref 3–14)
AST SERPL W P-5'-P-CCNC: 14 U/L (ref 0–45)
B-HCG SERPL-ACNC: 2598 IU/L (ref 0–5)
BILIRUB SERPL-MCNC: 0.2 MG/DL (ref 0.2–1.3)
BUN SERPL-MCNC: 12 MG/DL (ref 7–30)
CALCIUM SERPL-MCNC: 8.9 MG/DL (ref 8.5–10.1)
CHLORIDE SERPL-SCNC: 103 MMOL/L (ref 94–109)
CO2 SERPL-SCNC: 29 MMOL/L (ref 20–32)
CREAT SERPL-MCNC: 0.55 MG/DL (ref 0.52–1.04)
ERYTHROCYTE [DISTWIDTH] IN BLOOD BY AUTOMATED COUNT: 12.8 % (ref 10–15)
GFR SERPL CREATININE-BSD FRML MDRD: >90 ML/MIN/{1.73_M2}
GLUCOSE SERPL-MCNC: 82 MG/DL (ref 70–99)
HCG UR QL: POSITIVE
HCT VFR BLD AUTO: 37.4 % (ref 35–47)
HGB BLD-MCNC: 12.9 G/DL (ref 11.7–15.7)
MCH RBC QN AUTO: 30.5 PG (ref 26.5–33)
MCHC RBC AUTO-ENTMCNC: 34.5 G/DL (ref 31.5–36.5)
MCV RBC AUTO: 88 FL (ref 78–100)
PLATELET # BLD AUTO: 270 10E9/L (ref 150–450)
POTASSIUM SERPL-SCNC: 3.8 MMOL/L (ref 3.4–5.3)
PROT SERPL-MCNC: 7.4 G/DL (ref 6.8–8.8)
RBC # BLD AUTO: 4.23 10E12/L (ref 3.8–5.2)
SODIUM SERPL-SCNC: 136 MMOL/L (ref 133–144)
WBC # BLD AUTO: 7.1 10E9/L (ref 4–11)

## 2019-10-14 PROCEDURE — 85027 COMPLETE CBC AUTOMATED: CPT | Performed by: OBSTETRICS & GYNECOLOGY

## 2019-10-14 PROCEDURE — 81025 URINE PREGNANCY TEST: CPT | Performed by: OBSTETRICS & GYNECOLOGY

## 2019-10-14 PROCEDURE — 84702 CHORIONIC GONADOTROPIN TEST: CPT | Performed by: OBSTETRICS & GYNECOLOGY

## 2019-10-14 PROCEDURE — 36415 COLL VENOUS BLD VENIPUNCTURE: CPT | Performed by: OBSTETRICS & GYNECOLOGY

## 2019-10-14 PROCEDURE — 80053 COMPREHEN METABOLIC PANEL: CPT | Performed by: OBSTETRICS & GYNECOLOGY

## 2019-10-14 NOTE — PROGRESS NOTES
Mary Torres is a 29 year old here today for a pregnancy test.  LMP: Patient's last menstrual period was 09/10/2019 (exact date).  Wt: 149 lbs 0 oz.    Symptoms include absence of menses.    Mary informed of positive pregnancy test results. FABRICIO: 19    Educational advice given: nutrition, smoking and drugs & alcohol.    Current medications reviewed: Yes    Previous pregnancy history remarkable for: no issues. This is fourth pregnancy.     Plan: follow-up appointment with Dr. Palacios for pre-aleyda care, take multivitamin or pre- vitamins and OB Education packet given.    She is to call back if she has any questions or concerns.  She is advised to notify a provider immediately if she experiences any severe cramping or abdominal pain or any vaginal bleeding.    Next 5 appointments (look out 90 days)    2019  6:00 PM CST  New Prenatal with ISELA Laughlin CNM  [facility] [address] [facility] [address]  [phone]        Diana Leavitt, RN, BSN

## 2019-11-12 ASSESSMENT — PATIENT HEALTH QUESTIONNAIRE - PHQ9
SUM OF ALL RESPONSES TO PHQ QUESTIONS 1-9: 0
SUM OF ALL RESPONSES TO PHQ QUESTIONS 1-9: 0

## 2019-11-13 ASSESSMENT — PATIENT HEALTH QUESTIONNAIRE - PHQ9: SUM OF ALL RESPONSES TO PHQ QUESTIONS 1-9: 0

## 2019-11-14 ENCOUNTER — PRENATAL OFFICE VISIT (OUTPATIENT)
Dept: OBGYN | Facility: OTHER | Age: 29
End: 2019-11-14
Payer: COMMERCIAL

## 2019-11-14 VITALS
WEIGHT: 156.5 LBS | SYSTOLIC BLOOD PRESSURE: 114 MMHG | HEART RATE: 72 BPM | HEIGHT: 63 IN | DIASTOLIC BLOOD PRESSURE: 58 MMHG | BODY MASS INDEX: 27.73 KG/M2

## 2019-11-14 DIAGNOSIS — Z98.891 HISTORY OF 3 CESAREAN SECTIONS: ICD-10-CM

## 2019-11-14 DIAGNOSIS — Z34.81 ENCOUNTER FOR SUPERVISION OF OTHER NORMAL PREGNANCY IN FIRST TRIMESTER: Primary | ICD-10-CM

## 2019-11-14 DIAGNOSIS — Z23 NEED FOR PROPHYLACTIC VACCINATION AND INOCULATION AGAINST INFLUENZA: ICD-10-CM

## 2019-11-14 LAB
ALBUMIN UR-MCNC: NEGATIVE MG/DL
APPEARANCE UR: CLEAR
BILIRUB UR QL STRIP: NEGATIVE
COLOR UR AUTO: YELLOW
ERYTHROCYTE [DISTWIDTH] IN BLOOD BY AUTOMATED COUNT: 12.7 % (ref 10–15)
GLUCOSE UR STRIP-MCNC: NEGATIVE MG/DL
HCT VFR BLD AUTO: 34 % (ref 35–47)
HGB BLD-MCNC: 11.7 G/DL (ref 11.7–15.7)
HGB UR QL STRIP: ABNORMAL
KETONES UR STRIP-MCNC: NEGATIVE MG/DL
LEUKOCYTE ESTERASE UR QL STRIP: NEGATIVE
MCH RBC QN AUTO: 30.6 PG (ref 26.5–33)
MCHC RBC AUTO-ENTMCNC: 34.4 G/DL (ref 31.5–36.5)
MCV RBC AUTO: 89 FL (ref 78–100)
NITRATE UR QL: NEGATIVE
PH UR STRIP: 8.5 PH (ref 5–7)
PLATELET # BLD AUTO: 230 10E9/L (ref 150–450)
RBC # BLD AUTO: 3.82 10E12/L (ref 3.8–5.2)
SOURCE: ABNORMAL
SP GR UR STRIP: 1.01 (ref 1–1.03)
UROBILINOGEN UR STRIP-ACNC: 0.2 EU/DL (ref 0.2–1)
WBC # BLD AUTO: 8.1 10E9/L (ref 4–11)

## 2019-11-14 PROCEDURE — 85027 COMPLETE CBC AUTOMATED: CPT | Performed by: ADVANCED PRACTICE MIDWIFE

## 2019-11-14 PROCEDURE — 86901 BLOOD TYPING SEROLOGIC RH(D): CPT | Performed by: ADVANCED PRACTICE MIDWIFE

## 2019-11-14 PROCEDURE — 87340 HEPATITIS B SURFACE AG IA: CPT | Performed by: ADVANCED PRACTICE MIDWIFE

## 2019-11-14 PROCEDURE — 86850 RBC ANTIBODY SCREEN: CPT | Performed by: ADVANCED PRACTICE MIDWIFE

## 2019-11-14 PROCEDURE — 99207 ZZC FIRST OB VISIT: CPT | Performed by: ADVANCED PRACTICE MIDWIFE

## 2019-11-14 PROCEDURE — 86900 BLOOD TYPING SEROLOGIC ABO: CPT | Performed by: ADVANCED PRACTICE MIDWIFE

## 2019-11-14 PROCEDURE — 87086 URINE CULTURE/COLONY COUNT: CPT | Performed by: ADVANCED PRACTICE MIDWIFE

## 2019-11-14 PROCEDURE — 90471 IMMUNIZATION ADMIN: CPT | Performed by: ADVANCED PRACTICE MIDWIFE

## 2019-11-14 PROCEDURE — 81003 URINALYSIS AUTO W/O SCOPE: CPT | Performed by: ADVANCED PRACTICE MIDWIFE

## 2019-11-14 PROCEDURE — 87389 HIV-1 AG W/HIV-1&-2 AB AG IA: CPT | Performed by: ADVANCED PRACTICE MIDWIFE

## 2019-11-14 PROCEDURE — 86762 RUBELLA ANTIBODY: CPT | Performed by: ADVANCED PRACTICE MIDWIFE

## 2019-11-14 PROCEDURE — 90686 IIV4 VACC NO PRSV 0.5 ML IM: CPT | Performed by: ADVANCED PRACTICE MIDWIFE

## 2019-11-14 PROCEDURE — 36415 COLL VENOUS BLD VENIPUNCTURE: CPT | Performed by: ADVANCED PRACTICE MIDWIFE

## 2019-11-14 PROCEDURE — 86780 TREPONEMA PALLIDUM: CPT | Performed by: ADVANCED PRACTICE MIDWIFE

## 2019-11-14 ASSESSMENT — MIFFLIN-ST. JEOR: SCORE: 1409.01

## 2019-11-15 LAB
ABO + RH BLD: NORMAL
ABO + RH BLD: NORMAL
BLD GP AB SCN SERPL QL: NORMAL
BLOOD BANK CMNT PATIENT-IMP: NORMAL
SPECIMEN EXP DATE BLD: NORMAL

## 2019-11-15 NOTE — PROGRESS NOTES
Answers for HPI/ROS submitted by the patient on 2019   PHQ9 TOTAL SCORE: 0    Mary Torres is a 29 year old  who presents to the clinic for an new ob visit.   Estimated Date of Delivery: 2020 is calculated from Patient's last menstrual period was 09/10/2019 (exact date).       She has not had bleeding since her LMP.   She has had mild nausea. Weigh loss has not occurred.     HISTORY  updated and reviewed  Past Medical History:   Diagnosis Date     Asthma, intermittent      Past Surgical History:   Procedure Laterality Date      SECTION  13    Failure to descend     ENT SURGERY      wisdom teeth     Social History     Socioeconomic History     Marital status:      Spouse name: Not on file     Number of children: 1     Years of education: Not on file     Highest education level: Not on file   Occupational History     Employer: [employer]     Comment: MA-  @ [employer]   Social Needs     Financial resource strain: Not on file     Food insecurity:     Worry: Not on file     Inability: Not on file     Transportation needs:     Medical: Not on file     Non-medical: Not on file   Tobacco Use     Smoking status: Never Smoker     Smokeless tobacco: Never Used   Substance and Sexual Activity     Alcohol use: No     Drug use: No     Sexual activity: Yes     Partners: Male     Birth control/protection: None     Comment: none   Lifestyle     Physical activity:     Days per week: Not on file     Minutes per session: Not on file     Stress: Not on file   Relationships     Social connections:     Talks on phone: Not on file     Gets together: Not on file     Attends [employer] service: Not on file     Active member of club or organization: Not on file     Attends meetings of clubs or organizations: Not on file     Relationship status: Not on file     Intimate partner violence:     Fear of current or ex partner: Not on file     Emotionally abused: Not on file     Physically abused:  Not on file     Forced sexual activity: Not on file   Other Topics Concern     Parent/sibling w/ CABG, MI or angioplasty before 65F 55M? Not Asked      Service No     Blood Transfusions No     Caffeine Concern No     Occupational Exposure No     Hobby Hazards No     Sleep Concern No     Stress Concern No     Weight Concern No     Special Diet No     Back Care No     Exercise Yes     Bike Helmet No     Comment: don't ride bike     Seat Belt Yes     Self-Exams Yes   Social History Narrative    Lives with spouse and daughter, in own home. One dog. No cats.      Health Maintenance   Topic Date Due     INFLUENZA VACCINE (1) 09/01/2019     ASTHMA CONTROL TEST  09/11/2019     MATERNAL SCREENING  12/24/2019     ASTHMA ACTION PLAN  02/18/2020     OBGCT (OB)  02/25/2020     REPEAT ANTIBODY SCREEN (OB)  03/24/2020     RH IMMUNE GLOBULIN (OB)  03/24/2020     GROUP B STREP SCREENING  05/19/2020     PREVENTIVE CARE VISIT  07/22/2020     PAP  07/22/2022     DTAP/TDAP/TD IMMUNIZATION (5 - Td) 06/11/2028     HIV SCREENING  Completed     PHQ-2  Completed     IPV IMMUNIZATION  Aged Out     MENINGITIS IMMUNIZATION  Aged Out     Family History   Problem Relation Age of Onset     Asthma Mother      Diabetes Father      Thyroid Disease Father      Cancer Paternal Grandmother         lung cancer     Asthma Maternal Grandmother      Asthma Sister      C.A.D. No family hx of      Hypertension No family hx of      Cerebrovascular Disease No family hx of      Breast Cancer No family hx of      Cancer - colorectal No family hx of      Prostate Cancer No family hx of      Alcohol/Drug No family hx of      Allergies No family hx of      Alzheimer Disease No family hx of      Anesthesia Reaction No family hx of      Arthritis No family hx of      Blood Disease No family hx of      Cardiovascular No family hx of      Circulatory No family hx of      Congenital Anomalies No family hx of      Connective Tissue Disorder No family hx of       "Depression No family hx of      Endocrine Disease No family hx of      Eye Disorder No family hx of      Genetic Disorder No family hx of      Gastrointestinal Disease No family hx of      Genitourinary Problems No family hx of      Gynecology No family hx of      Heart Disease No family hx of      Lipids No family hx of      Musculoskeletal Disorder No family hx of      Neurologic Disorder No family hx of      Obesity No family hx of      Osteoporosis No family hx of      Psychotic Disorder No family hx of      Respiratory No family hx of      Family History Negative No family hx of      Hearing Loss No family hx of      Skin Cancer No family hx of             ROS: 10 point ROS neg other than the symptoms noted above in the HPI.      PHYSICAL EXAM  /58 (BP Location: Right arm, Patient Position: Sitting, Cuff Size: Adult Regular)   Pulse 72   Ht 1.6 m (5' 3\")   Wt 71 kg (156 lb 8 oz)   LMP 09/10/2019 (Exact Date)   BMI 27.72 kg/m    GENERAL:  Pleasant pregnant female, alert, cooperative  and well groomed.  SKIN:  Warm and dry, without lesions or rashes  HEAD: Symmetrical features.  EYES:  PERRLA.  EARS: Tympanic membranes gray, translucent and intact.  MOUTH:  Buccal mucosa pink, moist without lesions.  Teeth in good repair.    NECK:  Thyroid without enlargement and nodules.  Lymph nodes not palpable.   LUNGS:  Clear to auscultation.  BREAST:  Symmetrical.  No dominant, fixed or suspicious masses are noted.  No skin or nipple changes or axillary nodes.    Nipples everted.      HEART:  RRR with  out murmur.  ABDOMEN: Soft without masses , tenderness or organomegaly.  No CVA tenderness.  Uterus palpable at size equal to dates.  Well healed scar from  section.  MUSCULOSKELETAL:  Full range of motion  EXTREMITIES:  No edema. No significant varicosities.    ASSESSMENT:  Intrauterine pregnancy of 9w2d  (Z23) Need for prophylactic vaccination and inoculation against influenza  (primary encounter " diagnosis)  Comment:   Plan: INFLUENZA VACCINE IM > 6 MONTHS VALENT IIV4         [52914], Vaccine Administration, Initial         [15007]            (Z34.81) Encounter for supervision of other normal pregnancy in first trimester  Comment:   Plan: UA without Microscopic, Urine Culture Aerobic         Bacterial, CBC with platelets, HIV Antigen         Antibody Combo, Rubella Antibody IgG         Quantitative, Hepatitis B surface antigen,         Treponema Abs w Reflex to RPR and Titer, ABO/Rh        type and screen              PLAN:  Options for  testing for chromosomal and birth defects were discussed with the patient. Diagnostic tests include CVS and Amniocentesis. We discussed that these tests are definitive but invasive and do carry a risk of fetal loss.   Screening tests include nuchal translucency/blood marker testing in the first trimester and quad screening in the second trimester. We discussed that these are screening tests and not diagnostic tests and that false positives and negatives are a distinct possibility.  Declines  testing.    Instructed on best evidence for: weight gain for her weight and height for pregnancy; healthy diet and foods to avoid; exercise and activity during pregnancy;avoiding exposure to toxoplasmosis; and maintenance of a generally healthy lifestyle.     [facility] [facility]    Reviewed transmission of and avoidance strategies for CMV.    Discussed travel cautions.    She had her parnter  have   not traveled to areas currently infected with zika in the past 6 months and currently have  no plans to travel to an area infected with Zika.   If travel plans change they will inform us.      Declines first trimester ultrasound because insurance will not pay.

## 2019-11-15 NOTE — NURSING NOTE
"Chief Complaint   Patient presents with     Prenatal Care       Initial /58 (BP Location: Right arm, Patient Position: Sitting, Cuff Size: Adult Regular)   Pulse 72   Ht 1.6 m (5' 3\")   Wt 71 kg (156 lb 8 oz)   LMP 09/10/2019 (Exact Date)   BMI 27.72 kg/m   Estimated body mass index is 27.72 kg/m  as calculated from the following:    Height as of this encounter: 1.6 m (5' 3\").    Weight as of this encounter: 71 kg (156 lb 8 oz).  Medication Reconciliation: francis Gross CMA    "

## 2019-11-15 NOTE — PATIENT INSTRUCTIONS
Thank for choosing our clinic for your health care needs. Our goal is to provided you with excellent care. One way that we continue to improve our care is by listening to our patients. Please take a few minutes to complete the written survey that you may receive in the mail after your visit.     You may reach your care team by calling the following number:    [place]- [phone]  [place] [phone]  For clinic hours at [facility]  please visit the [facility] web site [email or web address]    Notification of your lab results:  Normal or non-critical lab and imaging results will be communicated to you by Mychart, letter, or phone within 7 days. If you do not hear from us within 10 days, please contact us through [place] [facility] or phone. If you have a critical or abnormal lab result, we will notify you by phone as soon as possible.  Pap smears often take a bit longer.     After Hours nurse advice:  [facility] Nurse Advisors:  [phone]     Medication Refills:  Please contact your pharmacy and they will forward the refill to us. Please allow 3 business days for your refills to be completed.     Secure access to your medical record:  Use [employer] (secure email communication and access to your chart) to send your primary care provider a message or make an appointment. Ask someone on your Team how to sign up for [employer]. To log on to [employer] or for more information in [employer] please visit the website at [email or web address].            How to prevent CMV or cytomegalovirus infection while you are pregnant:    Thoroughly wash your hands with soap and warm water especially after doing things such as:  Diaper changes  Feeding or bathing a child  Wiping a child's runny nose or drool  Handling a child's toys    Do not share cups, plates, utensils, toothbrushes or food with your children  Do not kiss young children on the mouth or cheek. Instead, kiss them on the head or give them a hug.  Do not share towels or  washcloths with young children.  Clean toy, counter tops, and other surfaces that come in contact with urine or saliva.        LISTERIA  Individuals can reduce the risk for listeria contamination through proper food selection, handling, and storage, such as:    Rinsing raw produce (fuits and vegetables) before eating, cutting, or cooking;    Keeping refrigerators at 40 degrees F or lower;    Buying soft cheeses only if their labels state that they are made with pasteurized milk.  Also avoiding cheese that has not been initially wrapped in plastic.  These cheeses include brie, camembert, blue and the soft Mexican cheeses like con queso.    Heating all food that can be heated but especially hot dogs, luncheon meats, and cold cuts to an internal temperature of 165 degrees F or until steaming hot before serving them; and    Washing your hands for at least 20 seconds with warm water and soap before and after handling cantaloupes or other melons.    Watch for food recalls for listeria and contact us if you believe you have been exposed.               First line remedies for nausea in pregnancy    Eat small frequent meals every 2-3 hours if possible.   Avoid food at extremes of temparture and drinks with carbination.  Eat foods that appeal to you, avoiding fats and spicy foods.  Bread, pasta, crackers, potatoes, and rice tend to be tolerated the best.  Don't worry about what you eat in the first 3 months, it is more important that you can eat and keep it down.   Try flat ginger ale.  Ginger is a herbal remedy for nausea and you can use it in any form.  There are ginger tablets you can purchase.  The dose is 500 to 1000 mg a day.   You may also try doxylamine 12.5 mg three times a day which is a sleeping medication along with Vitamin B6 25 mg three times a day.  This combination takes up to a week to work so give it some time.  Be sure to take both the doxylamine and B6  Doxylamine is sometimes called Unisom and it comes in  25 mg tablets so you will have to break it in half and take half a tablet.   It is important to take the Unisom and B6 together or it won't be effective.  If you begin to vomit more than 5 or 6 times a day and feel that you are unable to keep anything down, call the clinic for an appointment to be seen.                Fruits and vegetables high in pesticide contamination  Strawberries  Spinach  Kale  Nectarines  Peaches  Apples  Grapes  Cherries  Pears  Tomatoes  Celery  Potatoes  Hot Peppers.     Consider extra washing of these fruits and vegetables, peeling if possible or purchasing organics.     Fruits and vegetables lowest if pesticide contramination:  Avocado  Sweet corn  Pineapple  Cabbage   Onions   Frozen peas  Papaya  Asparagus  Mushrooms  Eggplant  Honeydew  Kiwi  Cantaloupe  Cauliflower  Broccoli      Consider eliminating or reducing the following additives in personal care products and make up:  Triclosan  Paraben  Phthalates  Phenols  Products that do not contain these additives are often found in the organic or green sections of stores.

## 2019-11-16 LAB
BACTERIA SPEC CULT: NO GROWTH
Lab: NORMAL
RUBV IGG SERPL IA-ACNC: 18 IU/ML
SPECIMEN SOURCE: NORMAL
T PALLIDUM AB SER QL: NONREACTIVE

## 2019-11-18 LAB
HBV SURFACE AG SERPL QL IA: NONREACTIVE
HIV 1+2 AB+HIV1 P24 AG SERPL QL IA: NONREACTIVE

## 2019-12-16 ENCOUNTER — PRENATAL OFFICE VISIT (OUTPATIENT)
Dept: OBGYN | Facility: OTHER | Age: 29
End: 2019-12-16
Payer: COMMERCIAL

## 2019-12-16 VITALS
SYSTOLIC BLOOD PRESSURE: 120 MMHG | BODY MASS INDEX: 27.1 KG/M2 | HEART RATE: 96 BPM | DIASTOLIC BLOOD PRESSURE: 56 MMHG | WEIGHT: 153 LBS

## 2019-12-16 DIAGNOSIS — Z34.92 NORMAL PREGNANCY IN SECOND TRIMESTER: Primary | ICD-10-CM

## 2019-12-16 DIAGNOSIS — J45.20 MILD INTERMITTENT ASTHMA WITHOUT COMPLICATION: ICD-10-CM

## 2019-12-16 DIAGNOSIS — Z98.891 HISTORY OF 3 CESAREAN SECTIONS: Chronic | ICD-10-CM

## 2019-12-16 PROBLEM — Z23 NEED FOR PROPHYLACTIC VACCINATION AND INOCULATION AGAINST INFLUENZA: Status: RESOLVED | Noted: 2019-11-14 | Resolved: 2019-12-16

## 2019-12-16 PROCEDURE — 99207 ZZC PRENATAL VISIT: CPT | Performed by: OBSTETRICS & GYNECOLOGY

## 2019-12-16 NOTE — PROGRESS NOTES
29 year old  at 13w6d weeks presents to the clinic for a routine prenatal visit.  Feeling well.  No vaginal bleeding, leakage of fluid, or contractions   Fundal height=s=d  TSLd=063  Asthma=stable  Discussed quad screen and she declines  Will schedule anatomy ultrasound.  RTC 4 weeks.    Jailyn Palacios DO

## 2019-12-16 NOTE — PATIENT INSTRUCTIONS
If you have any questions regarding your visit, Please contact your care team.    Women s Health CLINIC HOURS TELEPHONE NUMBER   Jailyn Palacios M.D.    [place]           Monday-[facility]  7:00 am - 5 pm Monday's Tuesday- [facility]  8:00am- 5 pm  Wednesday- Off  Thursday- Off Surgery  Friday-Am [place], and [facility]  [place] 8:00-11:30 AM  [place] 1:00-5:00 PM [facility]  [address] [place] [address]  [phone] ask for [facility]    Imaging [phone]    [facility]  [address] [place], [place] [id number] [phone]  Imaging [phone]    [facility]  [address] [place], [place] [id number] [phone]  Imaging Scheduling - [phone]     Urgent Care locations:    [facility] Saturday and Sunday   9 am - 5 pm    Monday-Friday   12 pm - 8 pm  Saturday and Sunday   9 am - 5 pm   [phone]       If you need a medication refill, please contact your pharmacy. Please allow 3 business days for your refill to be completed.  As always, Thank you for trusting us with your healthcare needs!

## 2020-01-13 ENCOUNTER — PRENATAL OFFICE VISIT (OUTPATIENT)
Dept: OBGYN | Facility: OTHER | Age: 30
End: 2020-01-13
Payer: COMMERCIAL

## 2020-01-13 VITALS
WEIGHT: 160 LBS | DIASTOLIC BLOOD PRESSURE: 60 MMHG | SYSTOLIC BLOOD PRESSURE: 130 MMHG | BODY MASS INDEX: 28.34 KG/M2 | HEART RATE: 88 BPM

## 2020-01-13 DIAGNOSIS — Z34.92 NORMAL PREGNANCY IN SECOND TRIMESTER: Primary | ICD-10-CM

## 2020-01-13 DIAGNOSIS — J45.20 MILD INTERMITTENT ASTHMA WITHOUT COMPLICATION: ICD-10-CM

## 2020-01-13 PROCEDURE — 99207 ZZC PRENATAL VISIT: CPT | Performed by: OBSTETRICS & GYNECOLOGY

## 2020-01-13 NOTE — PROGRESS NOTES
29 year old  at 17w6d weeks presents to the clinic for a routine prenatal visit.  Feeling well.  No vaginal bleeding, leakage of fluid, or contractions   Fundal height=s=d  XDUz=324  Discussed quad screen and she declines  Asthma=stable  Anatomy ultrasound on   RTC 4 weeks.    Jailyn Palacios DO

## 2020-01-13 NOTE — PATIENT INSTRUCTIONS
If you have any questions regarding your visit, Please contact your care team.    Women s Health CLINIC HOURS TELEPHONE NUMBER   Jailyn Palacios M.D.    [place]           Monday-[facility]  7:00 am - 5 pm Monday's Tuesday- [facility]  8:00am- 5 pm  Wednesday- Off  Thursday- Off Surgery  Friday-Am [place], and [facility]  [place] 8:00-11:30 AM  [place] 1:00-5:00 PM [facility]  [address] [place] [address]  [phone] ask for [facility]    Imaging [phone]    [facility]  [address] [place], [place] [id number] [phone]  Imaging [phone]    [facility]  [address] [place], [place] [id number] [phone]  Imaging Scheduling - [phone]     Urgent Care locations:    [facility] Saturday and Sunday   9 am - 5 pm    Monday-Friday   12 pm - 8 pm  Saturday and Sunday   9 am - 5 pm   [phone]       If you need a medication refill, please contact your pharmacy. Please allow 3 business days for your refill to be completed.  As always, Thank you for trusting us with your healthcare needs!

## 2020-01-31 ENCOUNTER — ANCILLARY PROCEDURE (OUTPATIENT)
Dept: ULTRASOUND IMAGING | Facility: OTHER | Age: 30
End: 2020-01-31
Attending: OBSTETRICS & GYNECOLOGY
Payer: COMMERCIAL

## 2020-01-31 DIAGNOSIS — Z34.92 NORMAL PREGNANCY IN SECOND TRIMESTER: ICD-10-CM

## 2020-01-31 PROCEDURE — 76805 OB US >/= 14 WKS SNGL FETUS: CPT

## 2020-02-03 DIAGNOSIS — Z34.92 NORMAL PREGNANCY IN SECOND TRIMESTER: Primary | ICD-10-CM

## 2020-02-07 ENCOUNTER — PRENATAL OFFICE VISIT (OUTPATIENT)
Dept: OBGYN | Facility: OTHER | Age: 30
End: 2020-02-07
Payer: COMMERCIAL

## 2020-02-07 VITALS
DIASTOLIC BLOOD PRESSURE: 60 MMHG | BODY MASS INDEX: 29.23 KG/M2 | SYSTOLIC BLOOD PRESSURE: 116 MMHG | WEIGHT: 165 LBS | HEART RATE: 80 BPM

## 2020-02-07 DIAGNOSIS — Z34.92 NORMAL PREGNANCY IN SECOND TRIMESTER: Primary | ICD-10-CM

## 2020-02-07 DIAGNOSIS — J45.20 MILD INTERMITTENT ASTHMA WITHOUT COMPLICATION: ICD-10-CM

## 2020-02-07 PROCEDURE — 99207 ZZC PRENATAL VISIT: CPT | Performed by: OBSTETRICS & GYNECOLOGY

## 2020-02-07 NOTE — PROGRESS NOTES
29 year old  at 21w3d weeks presents to the clinic for a routine prenatal visit.  No concerns.  No leakage of fluid, vaginal bleeding, or contractions   Good fetal movement.  FHTs: 156  Fundal height:  21cm  Asthma=stable  Normal anatomy ultrasound but heart views not well seen.  Patient will schedule this.  RTC 4 weeks    Jailyn Palacios DO

## 2020-02-27 ENCOUNTER — ANCILLARY PROCEDURE (OUTPATIENT)
Dept: ULTRASOUND IMAGING | Facility: OTHER | Age: 30
End: 2020-02-27
Attending: OBSTETRICS & GYNECOLOGY
Payer: COMMERCIAL

## 2020-02-27 DIAGNOSIS — Z34.92 NORMAL PREGNANCY IN SECOND TRIMESTER: ICD-10-CM

## 2020-02-27 PROCEDURE — 76816 OB US FOLLOW-UP PER FETUS: CPT

## 2020-03-09 ENCOUNTER — PRENATAL OFFICE VISIT (OUTPATIENT)
Dept: OBGYN | Facility: OTHER | Age: 30
End: 2020-03-09
Payer: COMMERCIAL

## 2020-03-09 VITALS
SYSTOLIC BLOOD PRESSURE: 112 MMHG | WEIGHT: 169.5 LBS | HEART RATE: 82 BPM | BODY MASS INDEX: 30.03 KG/M2 | DIASTOLIC BLOOD PRESSURE: 54 MMHG

## 2020-03-09 DIAGNOSIS — Z34.92 NORMAL PREGNANCY IN SECOND TRIMESTER: ICD-10-CM

## 2020-03-09 DIAGNOSIS — Z34.92 NORMAL PREGNANCY IN SECOND TRIMESTER: Primary | ICD-10-CM

## 2020-03-09 DIAGNOSIS — J45.20 MILD INTERMITTENT ASTHMA WITHOUT COMPLICATION: ICD-10-CM

## 2020-03-09 LAB
GLUCOSE 1H P 50 G GLC PO SERPL-MCNC: 83 MG/DL (ref 60–129)
HGB BLD-MCNC: 11.6 G/DL (ref 11.7–15.7)

## 2020-03-09 PROCEDURE — 36415 COLL VENOUS BLD VENIPUNCTURE: CPT | Performed by: OBSTETRICS & GYNECOLOGY

## 2020-03-09 PROCEDURE — 82950 GLUCOSE TEST: CPT | Performed by: OBSTETRICS & GYNECOLOGY

## 2020-03-09 PROCEDURE — 00000218 ZZHCL STATISTIC OBHBG - HEMOGLOBIN: Performed by: OBSTETRICS & GYNECOLOGY

## 2020-03-09 PROCEDURE — 99207 ZZC PRENATAL VISIT: CPT | Performed by: OBSTETRICS & GYNECOLOGY

## 2020-03-09 NOTE — PROGRESS NOTES
29 year old  at 25w6d weeks presents to the clinic for a routine prenatal visit.  No concerns.  No vaginal bleeding, leakage of fluid, or contractions   Good fetal movement.  FHTs= 142  Fundal height=25cm  Normal repeat anatomy ultrasound  RTC 2 weeks  GCT today  Asthma=stable  Blood type:O+    Jailyn Palacios DO

## 2020-03-09 NOTE — NURSING NOTE
"Chief Complaint   Patient presents with     Prenatal Care     25w6d       Initial /54 (BP Location: Right arm, Cuff Size: Adult Regular)   Pulse 82   Wt 76.9 kg (169 lb 8 oz)   LMP 09/10/2019 (Exact Date)   BMI 30.03 kg/m   Estimated body mass index is 30.03 kg/m  as calculated from the following:    Height as of 19: 1.6 m (5' 3\").    Weight as of this encounter: 76.9 kg (169 lb 8 oz).  BP completed using cuff size: regular        PHQ-9 score:    PHQ-9 SCORE 2019   PHQ-9 Total Score -   PHQ-9 Total Score MyChart 0   PHQ-9 Total Score 0       Gela Robertson MA on 3/9/2020 at 8:30 AM      "

## 2020-03-13 ENCOUNTER — TRANSFERRED RECORDS (OUTPATIENT)
Dept: HEALTH INFORMATION MANAGEMENT | Facility: CLINIC | Age: 30
End: 2020-03-13

## 2020-03-16 ENCOUNTER — TELEPHONE (OUTPATIENT)
Dept: OBGYN | Facility: CLINIC | Age: 30
End: 2020-03-16

## 2020-03-16 NOTE — TELEPHONE ENCOUNTER
Reason for Call:  Other appointment    Detailed comments: Patient calling stating she was seen in the hospital and was told to schedule a follow up with Dr Palacios. No Fever, No cough, No travel, To exposure. Please advise.    Phone Number Patient can be reached at: Cell number on file:    Telephone Information:   Mobile [phone]       Best Time: Any    Can we leave a detailed message on this number? YES    Call taken on 3/16/2020 at 12:30 PM by Jina Alarcon

## 2020-03-17 NOTE — TELEPHONE ENCOUNTER
"RN called to speak to patient.     Was seen in hospital Friday evening 3/13/2020.   She was having abdominal pain on and off Thursday and Friday and was getting some pretty bad headaches in addition to waves of severe nausea that would be \"super intense\" and then be gone. Blood pressure was elevated but nothing too concerning and did have a few contractions.     Per patient report L&D ran urine and that came back okay (negative), as well as monitored baby and then sent her home.     Today she is having no current symptoms at this time.   Bowel movements are regular. Denies constipation.  + fetal movement. Has the occasional contraction, nothing regular. No vaginal bleeding or leakage of fluid. No further concerns at this time.    RN will route to provider for further advisement. Patient is already scheduled for her next prenatal on Monday 3/23/2020.     Patient is okay with either mychart or phone call with Dr. Palacios's recommendation.    Anai Kline RN on 3/17/2020 at 12:55 PM    "

## 2020-03-17 NOTE — TELEPHONE ENCOUNTER
RN called and spoke to patient, relaying provider recommendations. Will keep her appointment for Monday. RN reviewed pre-eclampsia symptoms and advised her when to be seen sooner in L&D.     Patient verbalized understanding and agreed to plan.     Anai Kline RN on 3/17/2020 at 1:25 PM

## 2020-03-17 NOTE — TELEPHONE ENCOUNTER
I think patient can wait until her scheduled appointment to be seen.  Give her pre-eclampsia precautions please.     Jailyn Palacios, DO

## 2020-03-17 NOTE — TELEPHONE ENCOUNTER
Please call and triage patient and see what signs and symptoms she is having.    Jailyn Palacios, DO

## 2020-03-23 ENCOUNTER — PRENATAL OFFICE VISIT (OUTPATIENT)
Dept: OBGYN | Facility: OTHER | Age: 30
End: 2020-03-23
Payer: COMMERCIAL

## 2020-03-23 VITALS
BODY MASS INDEX: 30.11 KG/M2 | WEIGHT: 170 LBS | HEART RATE: 78 BPM | DIASTOLIC BLOOD PRESSURE: 54 MMHG | SYSTOLIC BLOOD PRESSURE: 110 MMHG

## 2020-03-23 DIAGNOSIS — R51.9 PREGNANCY HEADACHE IN THIRD TRIMESTER: Primary | ICD-10-CM

## 2020-03-23 DIAGNOSIS — Z30.2 ENCOUNTER FOR STERILIZATION: ICD-10-CM

## 2020-03-23 DIAGNOSIS — Z23 NEED FOR VACCINATION: ICD-10-CM

## 2020-03-23 DIAGNOSIS — Z98.891 HISTORY OF 3 CESAREAN SECTIONS: Chronic | ICD-10-CM

## 2020-03-23 DIAGNOSIS — O26.893 PREGNANCY HEADACHE IN THIRD TRIMESTER: Primary | ICD-10-CM

## 2020-03-23 LAB
ALBUMIN SERPL-MCNC: 2.7 G/DL (ref 3.4–5)
ALP SERPL-CCNC: 53 U/L (ref 40–150)
ALT SERPL W P-5'-P-CCNC: 19 U/L (ref 0–50)
ANION GAP SERPL CALCULATED.3IONS-SCNC: 5 MMOL/L (ref 3–14)
AST SERPL W P-5'-P-CCNC: 18 U/L (ref 0–45)
BILIRUB SERPL-MCNC: 0.2 MG/DL (ref 0.2–1.3)
BUN SERPL-MCNC: 8 MG/DL (ref 7–30)
CALCIUM SERPL-MCNC: 8.2 MG/DL (ref 8.5–10.1)
CHLORIDE SERPL-SCNC: 105 MMOL/L (ref 94–109)
CO2 SERPL-SCNC: 28 MMOL/L (ref 20–32)
CREAT SERPL-MCNC: 0.48 MG/DL (ref 0.52–1.04)
CREAT UR-MCNC: 56 MG/DL
ERYTHROCYTE [DISTWIDTH] IN BLOOD BY AUTOMATED COUNT: 12.7 % (ref 10–15)
GFR SERPL CREATININE-BSD FRML MDRD: >90 ML/MIN/{1.73_M2}
GLUCOSE SERPL-MCNC: 83 MG/DL (ref 70–99)
HCT VFR BLD AUTO: 35.4 % (ref 35–47)
HGB BLD-MCNC: 12 G/DL (ref 11.7–15.7)
MCH RBC QN AUTO: 30.5 PG (ref 26.5–33)
MCHC RBC AUTO-ENTMCNC: 33.9 G/DL (ref 31.5–36.5)
MCV RBC AUTO: 90 FL (ref 78–100)
PLATELET # BLD AUTO: 209 10E9/L (ref 150–450)
POTASSIUM SERPL-SCNC: 3.9 MMOL/L (ref 3.4–5.3)
PROT SERPL-MCNC: 6.5 G/DL (ref 6.8–8.8)
PROT UR-MCNC: 0.11 G/L
PROT/CREAT 24H UR: 0.19 G/G CR (ref 0–0.2)
RBC # BLD AUTO: 3.94 10E12/L (ref 3.8–5.2)
SODIUM SERPL-SCNC: 138 MMOL/L (ref 133–144)
WBC # BLD AUTO: 7.7 10E9/L (ref 4–11)

## 2020-03-23 PROCEDURE — 90715 TDAP VACCINE 7 YRS/> IM: CPT | Performed by: OBSTETRICS & GYNECOLOGY

## 2020-03-23 PROCEDURE — 85027 COMPLETE CBC AUTOMATED: CPT | Performed by: OBSTETRICS & GYNECOLOGY

## 2020-03-23 PROCEDURE — 99207 ZZC PRENATAL VISIT: CPT | Performed by: OBSTETRICS & GYNECOLOGY

## 2020-03-23 PROCEDURE — 90471 IMMUNIZATION ADMIN: CPT | Performed by: OBSTETRICS & GYNECOLOGY

## 2020-03-23 PROCEDURE — 36415 COLL VENOUS BLD VENIPUNCTURE: CPT | Performed by: OBSTETRICS & GYNECOLOGY

## 2020-03-23 PROCEDURE — 84156 ASSAY OF PROTEIN URINE: CPT | Performed by: OBSTETRICS & GYNECOLOGY

## 2020-03-23 PROCEDURE — 80053 COMPREHEN METABOLIC PANEL: CPT | Performed by: OBSTETRICS & GYNECOLOGY

## 2020-03-23 NOTE — NURSING NOTE
Prior to immunization administration, verified patients identity using patient s name and date of birth. Please see Immunization Activity for additional information.     Screening Questionnaire for Adult Immunization    Are you sick today?   No   Do you have allergies to medications, food, a vaccine component or latex?   No   Have you ever had a serious reaction after receiving a vaccination?   No   Do you have a long-term health problem with heart, lung, kidney, or metabolic disease (e.g., diabetes), asthma, a blood disorder, no spleen, complement component deficiency, a cochlear implant, or a spinal fluid leak?  Are you on long-term aspirin therapy?   No   Do you have cancer, leukemia, HIV/AIDS, or any other immune system problem?   No   Do you have a parent, brother, or sister with an immune system problem?   No   In the past 3 months, have you taken medications that affect  your immune system, such as prednisone, other steroids, or anticancer drugs; drugs for the treatment of rheumatoid arthritis, Crohn s disease, or psoriasis; or have you had radiation treatments?   No   Have you had a seizure, or a brain or other nervous system problem?   No   During the past year, have you received a transfusion of blood or blood    products, or been given immune (gamma) globulin or antiviral drug?   No   For women: Are you pregnant or is there a chance you could become       pregnant during the next month?   Yes   Have you received any vaccinations in the past 4 weeks?   No     Immunization questionnaire was positive for at least one answer.  Notified .        Per orders of Dr. Palacios, injection of Tdap (Adacel) given by Gela Robertson MA. Patient instructed to remain in clinic for 15 minutes afterwards, and to report any adverse reaction to me immediately.       Screening performed by Gela Robertson MA on 3/23/2020 at 8:37 AM.

## 2020-03-23 NOTE — NURSING NOTE
"Chief Complaint   Patient presents with     Prenatal Care     27w6d       Initial /54 (BP Location: Right arm, Cuff Size: Adult Regular)   Pulse 78   Wt 77.1 kg (170 lb)   LMP 09/10/2019 (Exact Date)   BMI 30.11 kg/m   Estimated body mass index is 30.11 kg/m  as calculated from the following:    Height as of 19: 1.6 m (5' 3\").    Weight as of this encounter: 77.1 kg (170 lb).  BP completed using cuff size: regular        PHQ-9 score:    PHQ-9 SCORE 2019   PHQ-9 Total Score -   PHQ-9 Total Score MyChart 0   PHQ-9 Total Score 0         Gela Robertson MA on 3/23/2020 at 8:17 AM    "

## 2020-03-23 NOTE — PROGRESS NOTES
29 year old  at 27w6d weeks presents to the clinic for a routine prenatal visit.  Headaches=they do get better with Tylenol.  Occasional spots in vision.  No N/V.  No RUQ pain or epigastric pain.  Patient was seen in triage for this and was told things were normal.  Will check labs and urine today.  No vaginal bleeding, leakage of fluid, or contractions   Good fetal movement.  Fundal height=29cm  WGGm=569's  GCT=83  Hgb=11.6  Rh O+  RTC 2 weeks.  Asthma=stable  History of c section=desires RCS with TL=try for .   We discussed 3-1000 failure rate.  Increased risk of ectopic pregnancy if patient were to get pregnant again.  Risk of damage to nearby organs, bleeding, infection, and anesthesia risks.  Consent signed.    TDAP today    Jailyn Palacios, DO

## 2020-03-24 ENCOUNTER — TELEPHONE (OUTPATIENT)
Dept: OBGYN | Facility: CLINIC | Age: 30
End: 2020-03-24

## 2020-03-24 NOTE — TELEPHONE ENCOUNTER
Order Questions     Question  Answer  Comment    Procedure name(s) - multi select  Repeat c section with tubal ligation     Is this a multi surgeon case?  No     Laterality  N/A     Reason for procedure  History of c sections, family status complete, infertility desired     Location of Case:  [facility]     Surgeon Procedure Time (incision to closure) in minutes (per procedure as applicable)  60     Note:  Surgical Case Time Needed (in minutes)    Date of Surgery (Requested):  2020     Time of Surgery (Requested):  7:30 AM     Patient Class (for admit prior to surgery, specify number of days in comments):  Surgery admit     Length of Stay:  3 days     Anesthesia  Spinal     H&P To Be Completed By:  Surgeon     Post-Op Appointment  6 weeks     Vendor Needed?  No       Surgery Scheduled    Date of Surgery 20 Time of Surgery 9:30 a.m.  Type of Anesthesia Anticipated: Spinal  Pre-Op: At prenatal visit  Post-Op:  6 weeks on 20 with Dr. Palacios at 1:00 pm  Pre-certification routed to Financial Counselors:  Yes    Surgery packet mailed to patient's home address: Yes  Patient instructed NPO 8 hours prior to surgery, arrive 1 hr 45 min hours prior to surgery, must have a .  Patient understood and agrees to the plan.      Surgery Pre-Certification    Medical Record Number: [id number]  Mary Torres  YOB: 1990   Phone: [phone] (home)   Primary Provider: No Ref-Primary, Physician    Reason for Admit:  Encounter for sterilization [Z30.2]        History of 3  sections [Z98.891]             Surgeon: NICOLA Palacios DO  Surgical Procedure: Repeat c section with tubal ligation   ICD-9 Coded:   Encounter for sterilization [Z30.2]        History of 3  sections [Z98.891]           Date of Surgery: 2020  Consent signed? Yes    Date signed: 3/23/20  Hospital: [facility]  Inpatient- Length of stay:  3 days.    Requestor:  Razia Shane     Location:  [place]

## 2020-03-25 NOTE — TELEPHONE ENCOUNTER
PB DOS: 2020  Type of Procedure: Repeat c section with tubal ligation   CPT Codes: 43891  ICD10 Codes: Encounter for sterilization [Z30.2] and History of 3  sections [Z98.891]   Surgeon/Ordering provider: Dr. Palacios  Pre-cert/Authorization completed:  Yes, No PA Required  Payer: [employer]  Date Checked: 3/25/2020  Spoke to Alessandro with [employer]  Ref. # [id number]/ Auth # N/A  Valid Dates: N/A    PA is required if the hospital stay exceeds 5 days post delivery. PA is not required for the tubal ligation. Notification of admission required on the day of admission following the procedure to be done by hospital.

## 2020-04-06 ENCOUNTER — VIRTUAL VISIT (OUTPATIENT)
Dept: OBGYN | Facility: CLINIC | Age: 30
End: 2020-04-06
Payer: COMMERCIAL

## 2020-04-06 DIAGNOSIS — Z98.891 HISTORY OF 3 CESAREAN SECTIONS: Primary | ICD-10-CM

## 2020-04-06 DIAGNOSIS — Z34.83 ENCOUNTER FOR SUPERVISION OF OTHER NORMAL PREGNANCY IN THIRD TRIMESTER: ICD-10-CM

## 2020-04-06 PROCEDURE — 99207 ZZC PRENATAL VISIT: CPT | Mod: TEL | Performed by: NURSE PRACTITIONER

## 2020-04-06 NOTE — PROGRESS NOTES
"Mary Torres is a 29 year old female who is being evaluated via a billable telephone visit.      The patient has been notified of following:     \"This telephone visit will be conducted via a call between you and your physician/provider. We have found that certain health care needs can be provided without the need for a physical exam.  This service lets us provide the care you need with a short phone conversation.  If a prescription is necessary we can send it directly to your pharmacy.  If lab work is needed we can place an order for that and you can then stop by our lab to have the test done at a later time.    If during the course of the call the physician/provider feels a telephone visit is not appropriate, you will not be charged for this service.\"     Patient has given verbal consent for Telephone visit?  Yes    Mary Torres complains of    Chief Complaint   Patient presents with     Prenatal Care       I have reviewed and updated the patient's Past Medical History, Social History, Family History and Medication List.    ALLERGIES  Patient has no known allergies.    Additional provider notes:    Prenatal flowsheet reviewed and updated as needed.  Denies vaginal bleeding, loss of fluid, contractions or cramping. Denies headache, nausea/vomiting, upper abdominal pain, vision changes, lower extremity swelling, chest pain or shortness of breath. Fetal movement: Regular  Her asthma is stable.    Patient without complaint.   Advice as per Anticipatory Guidance/Checklist updated.    A/P:  (Z98.891) History of 3  sections  (primary encounter diagnosis)  Comment: Questions asked and answered.   Discussed plan for prenatal visit schedule and appointment location going forward at this time and patient verbalizes understanding.  Next visit in 2 weeks in clinic.    (Z48.21) Encounter for supervision of other normal pregnancy in third trimester    ISELA Daniel CNP    "

## 2020-04-20 ENCOUNTER — PRENATAL OFFICE VISIT (OUTPATIENT)
Dept: OBGYN | Facility: CLINIC | Age: 30
End: 2020-04-20
Payer: COMMERCIAL

## 2020-04-20 VITALS
WEIGHT: 181.5 LBS | HEART RATE: 79 BPM | SYSTOLIC BLOOD PRESSURE: 123 MMHG | BODY MASS INDEX: 32.15 KG/M2 | DIASTOLIC BLOOD PRESSURE: 70 MMHG

## 2020-04-20 DIAGNOSIS — Z98.891 HISTORY OF 3 CESAREAN SECTIONS: Chronic | ICD-10-CM

## 2020-04-20 DIAGNOSIS — Z30.2 ENCOUNTER FOR STERILIZATION: ICD-10-CM

## 2020-04-20 DIAGNOSIS — Z34.93 NORMAL PREGNANCY IN THIRD TRIMESTER: Primary | ICD-10-CM

## 2020-04-20 PROCEDURE — 99207 ZZC PRENATAL VISIT: CPT | Performed by: OBSTETRICS & GYNECOLOGY

## 2020-04-20 NOTE — PROGRESS NOTES
30 year old  at 31w6d weeks presents to the clinic for a routine prenatal visit.  No concerns. Patient denies any vaginal bleeding, leakage of fluid, or contractions   Good fetal movement.    Fundal height=32cm  WJDf=723's  GDS=83  Hgb=12.0  RTC 2 weeks  Asthma=stable  RCS with TL=    Jailyn Palacios DO

## 2020-05-08 ENCOUNTER — PRENATAL OFFICE VISIT (OUTPATIENT)
Dept: OBGYN | Facility: CLINIC | Age: 30
End: 2020-05-08
Payer: COMMERCIAL

## 2020-05-08 VITALS
HEART RATE: 90 BPM | OXYGEN SATURATION: 98 % | BODY MASS INDEX: 32.58 KG/M2 | SYSTOLIC BLOOD PRESSURE: 118 MMHG | WEIGHT: 183.9 LBS | DIASTOLIC BLOOD PRESSURE: 60 MMHG

## 2020-05-08 DIAGNOSIS — J45.20 MILD INTERMITTENT ASTHMA WITHOUT COMPLICATION: ICD-10-CM

## 2020-05-08 DIAGNOSIS — Z34.93 NORMAL PREGNANCY IN THIRD TRIMESTER: Primary | ICD-10-CM

## 2020-05-08 PROCEDURE — 99207 ZZC PRENATAL VISIT: CPT | Performed by: OBSTETRICS & GYNECOLOGY

## 2020-05-08 NOTE — PROGRESS NOTES
30 year old  at 34w3d weeks presents to the clinic for a routine prenatal visit.    No concerns.  Patient denies any vaginal bleeding, leakage of fluid, or contractions     Good fetal movement  Fundal height=35cm  RBZy=190  RCS with TL=  Asthma=stable  Discussed labor precautions.  RTC 2 weeks    Jailyn Palacios DO

## 2020-05-19 ENCOUNTER — PRENATAL OFFICE VISIT (OUTPATIENT)
Dept: OBGYN | Facility: CLINIC | Age: 30
End: 2020-05-19
Payer: COMMERCIAL

## 2020-05-19 VITALS
SYSTOLIC BLOOD PRESSURE: 127 MMHG | OXYGEN SATURATION: 97 % | BODY MASS INDEX: 33.37 KG/M2 | WEIGHT: 188.4 LBS | DIASTOLIC BLOOD PRESSURE: 64 MMHG | HEART RATE: 88 BPM

## 2020-05-19 DIAGNOSIS — J45.20 MILD INTERMITTENT ASTHMA WITHOUT COMPLICATION: ICD-10-CM

## 2020-05-19 DIAGNOSIS — Z34.93 NORMAL PREGNANCY IN THIRD TRIMESTER: Primary | ICD-10-CM

## 2020-05-19 PROCEDURE — 87186 SC STD MICRODIL/AGAR DIL: CPT | Performed by: OBSTETRICS & GYNECOLOGY

## 2020-05-19 PROCEDURE — 99207 ZZC PRENATAL VISIT: CPT | Performed by: OBSTETRICS & GYNECOLOGY

## 2020-05-19 PROCEDURE — 87653 STREP B DNA AMP PROBE: CPT | Performed by: OBSTETRICS & GYNECOLOGY

## 2020-05-19 NOTE — PROGRESS NOTES
30 year old  at 36w0d weeks presents to the clinic for a routine prenatal visit.  No concerns.  No vaginal bleeding, leakage of fluid, or contractions  Fundal height=37cm  RCEj=260  CX=/-3  GBS done today  Labor precautions discussed  RCS with TL=  RTC 1 week    Jailyn Palacios DO

## 2020-05-20 LAB
GP B STREP DNA SPEC QL NAA+PROBE: POSITIVE
SPECIMEN SOURCE: ABNORMAL

## 2020-05-23 LAB
BACTERIA SPEC CULT: ABNORMAL
SPECIMEN SOURCE: ABNORMAL

## 2020-05-29 ENCOUNTER — PRENATAL OFFICE VISIT (OUTPATIENT)
Dept: OBGYN | Facility: CLINIC | Age: 30
End: 2020-05-29
Payer: COMMERCIAL

## 2020-05-29 VITALS
OXYGEN SATURATION: 97 % | SYSTOLIC BLOOD PRESSURE: 123 MMHG | HEART RATE: 87 BPM | DIASTOLIC BLOOD PRESSURE: 63 MMHG | BODY MASS INDEX: 33.44 KG/M2 | WEIGHT: 188.8 LBS

## 2020-05-29 DIAGNOSIS — Z98.891 HISTORY OF 3 CESAREAN SECTIONS: Chronic | ICD-10-CM

## 2020-05-29 DIAGNOSIS — Z34.93 NORMAL PREGNANCY IN THIRD TRIMESTER: Primary | ICD-10-CM

## 2020-05-29 PROCEDURE — 99207 ZZC PRENATAL VISIT: CPT | Performed by: OBSTETRICS & GYNECOLOGY

## 2020-05-29 NOTE — PROGRESS NOTES
30 year old  at 37w3d weeks presents to the clinic for a routine prenatal visit.  Complains of feeling more pressure.  No vaginal bleeding, leakage of fluid, or contractions   Patient complains of a headache for the last 4 days.  She has been getting really bad headaches almost everyday.  Tylenol is not helping.  More floaters in vision.  No vomiting.  Some nausea.  No fever or chills.  No sick contacts at home.  No epigastric or RUQ pain.   I recommended she go to L&D for a PIH workup and I spoke with OB On Call, Dr. Giordano.  Patient verbalizes understanding   Fundal height=38cm  FDPw=417's  CX=/-3  Discussed labor precautions  RCS with TL=  Asthma=stable  GBS=Positive    Jailyn Palacios,

## 2020-06-01 ENCOUNTER — PRENATAL OFFICE VISIT (OUTPATIENT)
Dept: OBGYN | Facility: CLINIC | Age: 30
End: 2020-06-01
Payer: COMMERCIAL

## 2020-06-01 VITALS
OXYGEN SATURATION: 98 % | BODY MASS INDEX: 34.54 KG/M2 | SYSTOLIC BLOOD PRESSURE: 128 MMHG | WEIGHT: 195 LBS | HEART RATE: 85 BPM | DIASTOLIC BLOOD PRESSURE: 68 MMHG

## 2020-06-01 DIAGNOSIS — Z34.93 NORMAL PREGNANCY IN THIRD TRIMESTER: ICD-10-CM

## 2020-06-01 PROCEDURE — 99207 ZZC PRENATAL VISIT: CPT | Performed by: OBSTETRICS & GYNECOLOGY

## 2020-06-01 NOTE — PATIENT INSTRUCTIONS
If you have any questions regarding your visit, Please contact your care team.     [email or web address]: [phone]  [facility] CLINIC HOURS TELEPHONE NUMBER       Osbaldo Lemus M.D.      Trice Paula-Medical Assistant    Michelle-  Tanika-     Monday-[place]  8:00a.m-4:45 p.m  Tuesday-[place]  9:00a.m-4:00 p.m  Wednesday-[place] 8:00a.m-4:45 p.m.  Thursday-[place]  8:00a.m-4:45 p.m.  Friday-[place]  8:00a.m-4:45 p.m. [facility]  [address]  [place], [place] [address] [phone] ask [facility]  [phone] Fax  Imaging [phone]    [facility]  [address]  [place], [place] [address] [phone]    [facility]  [address] [place] [address] [phone] [facility]  [phone] Fax  Imaging [phone]     Urgent Care locations:    [facility] Monday-Friday  5 pm - 9 pm  Saturday and Sunday   9 am - 5 pm  Monday-Friday   11 am - 9 pm  Saturday and Sunday   9 am - 5 pm   [phone]   If you need a medication refill, please contact your pharmacy. Please allow 3 business days for your refill to be completed.  As always, Thank you for trusting us with your healthcare needs!

## 2020-06-01 NOTE — PROGRESS NOTES
No significant signs, symptoms or concerns. Had neg Labor and Delivery evaluation last week for headaches. Repeat  section and TL next week.   The patient denies allergies, anesthesia problems, bleeding tendencies, corticosteroids, diabetes, thromboembolic phenonmenon, rheumatic fever, glaucoma, heart murmur, hepatitis, herbal supplements, recent illnesses, implanted devices, body jewelry, Latex sensitivity, transfusions, and tobacco use except for the following: none.   Total encounter time (physician together with patient) = 15min. Direct counseling, education and care coordination time (physician together with patient) = 10min. This counseling included the following: Advice appropriate to gestational age reviewed including pertinent Checklist items. Discussed condition, tests and care plan including risks, benefits, and alternatives. Problem list updated. Preop exam next.   A/P:  Mary was seen today for prenatal care.    Diagnoses and all orders for this visit:    Normal pregnancy in third trimester        Osbaldo Lemus MD

## 2020-06-02 ENCOUNTER — NURSE TRIAGE (OUTPATIENT)
Dept: NURSING | Facility: CLINIC | Age: 30
End: 2020-06-02

## 2020-06-02 ENCOUNTER — TELEPHONE (OUTPATIENT)
Dept: OBGYN | Facility: CLINIC | Age: 30
End: 2020-06-02

## 2020-06-02 DIAGNOSIS — Z01.818 PRE-PROCEDURAL EXAMINATION: Primary | ICD-10-CM

## 2020-06-02 NOTE — TELEPHONE ENCOUNTER
Patient telephone [phone].    Patient states she is scheduled for a  20 and received a message today to call to schedule a COVID-19 test prior to delivery.    Per Epic this RN notes a COVID-19 Virus test (Future) order placed.  Transferred Patient to COVID PCR testing line to schedule the appointment.    Protocol-  Information Only  Care advice reviewed.   Disposition-  Information given.   Transferred Patient to .  Caller states understanding of the recommended disposition.   Advised to call back if further questions or concerns.     TANG Lopez RN  [facility]     Additional Information    Health Information question, no triage required and triager able to answer question    Protocols used: INFORMATION ONLY CALL-A-

## 2020-06-02 NOTE — TELEPHONE ENCOUNTER
Left message with details on scheduling covid testing.    Gela Robertson MA on 6/2/2020 at 12:42 PM

## 2020-06-02 NOTE — TELEPHONE ENCOUNTER
COVID order placed.  Surgery 6/11.  Please call patient and remind her to have the testing done.  Thanks!    Jailyn Palacios, DO

## 2020-06-10 ENCOUNTER — TELEPHONE (OUTPATIENT)
Dept: OBGYN | Facility: CLINIC | Age: 30
End: 2020-06-10

## 2020-06-10 NOTE — TELEPHONE ENCOUNTER
Agbeh, Cephas Mawuena, MD  You 2 minutes ago (12:05 PM)       Agree with advice given.   CEPHAS AGBEH, MD.      Spoke with pt and let her know that Dr. Agbeh agrees with what I talked with her about this morning.   She states that the Benadryl she took earlier has definitely helped with her symptoms.  I suggested that if her hives and itching come back that she follow up with an FP provider and if she develops shortness of breath, difficulty breathing or swallowing or hives on face then go to UC or ED.    Pt verbalized understanding and agreed to plan.    Nayla Vazquez RN

## 2020-06-10 NOTE — TELEPHONE ENCOUNTER
Pt had a  on 6/3/2020 (a week ago).  She developed hives and pruritis last night.  She states the hives started on just her legs last night but this morning they have spread to where there are now on legs, trunk (mainly front) and some on arms.  The hives are raised, round, slightly red bumps that are about the size of a pencil eraser.  They are very itchy which caused pt to not sleep well last night.  She did take a shower in the middle of the night to try to relieve the itching.  It did help some.    Pt took one oral Benadryl this morning (about 15 minutes ago) with some relief.  Pt is breast feeding so only wanted to take one.    Pt denies shortness of breath, trouble breathing, scratchy throat, trouble swallowing or hives on face.  Denies fever.  Pt denies starting any new meds, no new foods recently. Pt hasn't used any new soaps, lotions or detergents.  No new clothes or sheets.  Nothing is different.    I explained to pt that I didn't think it was related to her recent  since it has been a week and she just developed the hives last night.  However, since she has no other symptoms and has not taken any new meds, foods or used any new soaps etc, I am unsure what could be causing her hives and itching.      I will route to the OB/GYN provider pool for further recommendations.  I did let the pt know that she may need to follow up with a family practice provider.    Advised pt to go to UC/ED if she develops scratchy throat, difficulty swallowing or breathing or if hives spread to face.  Will call pt back with OB/GYN provider's further recommendations.    Pt verbalized understanding and agreed to plan.    Nayla Vazquez RN

## 2020-07-24 ENCOUNTER — PRENATAL OFFICE VISIT (OUTPATIENT)
Dept: OBGYN | Facility: CLINIC | Age: 30
End: 2020-07-24
Payer: COMMERCIAL

## 2020-07-24 VITALS
HEART RATE: 63 BPM | OXYGEN SATURATION: 99 % | SYSTOLIC BLOOD PRESSURE: 110 MMHG | DIASTOLIC BLOOD PRESSURE: 71 MMHG | WEIGHT: 165.5 LBS | BODY MASS INDEX: 29.32 KG/M2

## 2020-07-24 DIAGNOSIS — Z98.891 S/P CESAREAN SECTION: ICD-10-CM

## 2020-07-24 PROCEDURE — 99207 ZZC POST PARTUM EXAM: CPT | Performed by: OBSTETRICS & GYNECOLOGY

## 2020-07-24 ASSESSMENT — PATIENT HEALTH QUESTIONNAIRE - PHQ9
5. POOR APPETITE OR OVEREATING: NOT AT ALL
SUM OF ALL RESPONSES TO PHQ QUESTIONS 1-9: 0

## 2020-07-24 ASSESSMENT — ANXIETY QUESTIONNAIRES
5. BEING SO RESTLESS THAT IT IS HARD TO SIT STILL: NOT AT ALL
3. WORRYING TOO MUCH ABOUT DIFFERENT THINGS: NOT AT ALL
7. FEELING AFRAID AS IF SOMETHING AWFUL MIGHT HAPPEN: NOT AT ALL
GAD7 TOTAL SCORE: 0
1. FEELING NERVOUS, ANXIOUS, OR ON EDGE: NOT AT ALL
6. BECOMING EASILY ANNOYED OR IRRITABLE: NOT AT ALL
2. NOT BEING ABLE TO STOP OR CONTROL WORRYING: NOT AT ALL

## 2020-07-24 NOTE — PATIENT INSTRUCTIONS
Please call if you any questions.    [facility]  [address] [place]   [address]  [phone]        Jailyn Palacios,

## 2020-07-24 NOTE — PROGRESS NOTES
Subjective  30 year old non-pregnant female presents today for a postpartum visit.  Patient had a RCS on 6/3/2020 with left salpingoophorectomy and right salpingectomy.  No pain.  No vaginal bleeding.  No problems urinating.  Normal bowel movements.  Patient is breast feeding.  No signs and symptoms of ppd.  Patient scored a 0 on the PHQ-9 and a 0 on the HUSAM-7.  No thoughts of suicide or infanticide.  Patient is not due for a pap smear today.        ROS: 10 point ROS neg other than the symptoms noted above in the HPI.  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 No family hx of      Hypertension No family hx of      Cerebrovascular Disease No family hx of      Breast Cancer No family hx of      Cancer - colorectal No family hx of      Prostate Cancer No family hx of      Alcohol/Drug No family hx of      Allergies No family hx of      Alzheimer Disease No family hx of      Anesthesia Reaction No family hx of      Arthritis No family hx of      Blood Disease No family hx of      Cardiovascular No family hx of      Circulatory No family hx of      Congenital Anomalies No family hx of      Connective Tissue Disorder No family hx of      Depression No family hx of      Endocrine Disease No family hx of      Eye Disorder No family hx of      Genetic Disorder No family hx of      Gastrointestinal Disease No family hx of      Genitourinary Problems No family hx of      Gynecology No family hx of      Heart Disease No family hx of      Lipids No family hx of      Musculoskeletal Disorder No family hx of      Neurologic Disorder No  family hx of      Obesity No family hx of      Osteoporosis No family hx of      Psychotic Disorder No family hx of      Respiratory No family hx of      Family History Negative No family hx of      Hearing Loss No family hx of      Skin Cancer No family hx of      Social History     Tobacco Use     Smoking status: Never Smoker     Smokeless tobacco: Never Used   Substance Use Topics     Alcohol use: No         Objective  Vitals: /71   Pulse 63   Wt 75.1 kg (165 lb 8 oz)   LMP 09/10/2019 (Exact Date)   SpO2 99%   BMI 29.32 kg/m    BMI= Body mass index is 29.32 kg/m .         Abd=soft, Nontender/nondistended, incision=healed well      Assessment  1.)  Post partum visit  2.)  S/p RCS on 6/3/2020 with left salpingoophorectomy and right salpingectomy         Plan  1.)  Follow-up for annual exam      Jailyn Palacios

## 2020-07-25 ASSESSMENT — ANXIETY QUESTIONNAIRES: GAD7 TOTAL SCORE: 0

## 2020-09-06 ENCOUNTER — E-VISIT (OUTPATIENT)
Dept: FAMILY MEDICINE | Facility: CLINIC | Age: 30
End: 2020-09-06
Payer: COMMERCIAL

## 2020-09-06 DIAGNOSIS — R30.0 DYSURIA: Primary | ICD-10-CM

## 2020-09-06 PROCEDURE — 99421 OL DIG E/M SVC 5-10 MIN: CPT | Performed by: PHYSICIAN ASSISTANT

## 2020-09-07 RX ORDER — CEPHALEXIN 500 MG/1
500 CAPSULE ORAL 2 TIMES DAILY
Qty: 14 CAPSULE | Refills: 0 | Status: SHIPPED | OUTPATIENT
Start: 2020-09-07 | End: 2021-01-13

## 2020-12-13 ENCOUNTER — HEALTH MAINTENANCE LETTER (OUTPATIENT)
Age: 30
End: 2020-12-13

## 2020-12-22 DIAGNOSIS — J45.21 INTERMITTENT ASTHMA WITH ACUTE EXACERBATION, UNSPECIFIED ASTHMA SEVERITY: ICD-10-CM

## 2020-12-29 ENCOUNTER — MYC MEDICAL ADVICE (OUTPATIENT)
Dept: FAMILY MEDICINE | Facility: CLINIC | Age: 30
End: 2020-12-29

## 2020-12-29 ENCOUNTER — TELEPHONE (OUTPATIENT)
Dept: FAMILY MEDICINE | Facility: CLINIC | Age: 30
End: 2020-12-29

## 2020-12-29 NOTE — TELEPHONE ENCOUNTER
Summary:    Patient is due/failing the following:   Asthma follow up and ACT    Action needed:   Patient needs office visit for follow up.    Type of outreach:    Phone, left message for patient to call back.   My chart letter sent  Questions for provider review:    None                                                                                                                                    Janet Arzola CMA       Chart routed to Care Team .        Panel Management Review      Patient has the following on her problem list:     Asthma review     ACT Total Scores 3/11/2019   ACT TOTAL SCORE -   ASTHMA ER VISITS -   ASTHMA HOSPITALIZATIONS -   ACT TOTAL SCORE (Goal Greater than or Equal to 20) 20   In the past 12 months, how many times did you visit the emergency room for your asthma without being admitted to the hospital? 0   In the past 12 months, how many times were you hospitalized overnight because of your asthma? 0      1. Is Asthma diagnosis on the Problem List? Yes    2. Is Asthma listed on Health Maintenance? Yes    3. Patient is due for:  ACT      Composite cancer screening  Chart review shows that this patient is due/due soon for the following None

## 2020-12-31 RX ORDER — ALBUTEROL SULFATE 90 UG/1
2 AEROSOL, METERED RESPIRATORY (INHALATION) EVERY 6 HOURS PRN
Qty: 1 INHALER | Refills: 3 | OUTPATIENT
Start: 2020-12-31

## 2020-12-31 ASSESSMENT — ASTHMA QUESTIONNAIRES: ACT_TOTALSCORE: 23

## 2021-01-12 ENCOUNTER — TELEPHONE (OUTPATIENT)
Dept: FAMILY MEDICINE | Facility: CLINIC | Age: 31
End: 2021-01-12

## 2021-01-12 DIAGNOSIS — J45.21 INTERMITTENT ASTHMA WITH ACUTE EXACERBATION, UNSPECIFIED ASTHMA SEVERITY: ICD-10-CM

## 2021-01-12 RX ORDER — ALBUTEROL SULFATE 90 UG/1
2 AEROSOL, METERED RESPIRATORY (INHALATION) EVERY 6 HOURS PRN
Qty: 1 INHALER | Refills: 3 | Status: CANCELLED | OUTPATIENT
Start: 2021-01-12

## 2021-01-12 NOTE — TELEPHONE ENCOUNTER
Pending Prescriptions:                       Disp   Refills    albuterol (PROAIR HFA/PROVENTIL HFA/LIVIER*1 Inha*3            Sig: Inhale 2 puffs into the lungs every 6 hours as           needed for shortness of breath / dyspnea    This was last filled by a float provider on 01/29/2019.    ACT Total Scores 12/30/2020   ACT TOTAL SCORE -   ASTHMA ER VISITS -   ASTHMA HOSPITALIZATIONS -   ACT TOTAL SCORE (Goal Greater than or Equal to 20) 23   In the past 12 months, how many times did you visit the emergency room for your asthma without being admitted to the hospital? 0   In the past 12 months, how many times were you hospitalized overnight because of your asthma? 0      Completed Evisit on 09/06/2020 for unrelated DX.    Please call and verify pharmacy She hasn't been seen for asthma in over a year.  Please help her schedule as well.then flag for provider to review and advise on refill.      Servando Carballo, BSN, RN, PHN

## 2021-01-12 NOTE — TELEPHONE ENCOUNTER
Patient calling said called pharmacy and the pharmacy said they reached out to clinic and has not heard back yet. Patient needs her albuterol (PROAIR HFA/PROVENTIL HFA/VENTOLIN HFA) 108 (90 Base) MCG/ACT inhaler ASAP.  Samia Rey,

## 2021-01-13 ENCOUNTER — VIRTUAL VISIT (OUTPATIENT)
Dept: FAMILY MEDICINE | Facility: CLINIC | Age: 31
End: 2021-01-13
Payer: COMMERCIAL

## 2021-01-13 DIAGNOSIS — J45.31 MILD PERSISTENT ASTHMA WITH ACUTE EXACERBATION: ICD-10-CM

## 2021-01-13 PROCEDURE — 99213 OFFICE O/P EST LOW 20 MIN: CPT | Mod: TEL | Performed by: PHYSICIAN ASSISTANT

## 2021-01-13 RX ORDER — DEXAMETHASONE 4 MG/1
2 TABLET ORAL 2 TIMES DAILY
Qty: 1 INHALER | Refills: 1 | Status: SHIPPED | OUTPATIENT
Start: 2021-01-13 | End: 2023-04-11

## 2021-01-13 RX ORDER — ALBUTEROL SULFATE 90 UG/1
2 AEROSOL, METERED RESPIRATORY (INHALATION) EVERY 6 HOURS PRN
Qty: 1 INHALER | Refills: 3 | Status: SHIPPED | OUTPATIENT
Start: 2021-01-13 | End: 2023-04-11

## 2021-01-13 NOTE — PROGRESS NOTES
Mary is a 30 year old who is being evaluated via a billable telephone visit.      What phone number would you like to be contacted at? [phone]  How would you like to obtain your AVS? Cecilia  Assessment & Plan     Mild persistent asthma with acute exacerbation  Refilled both inhalers  Updated ACT   - albuterol (PROAIR HFA/PROVENTIL HFA/VENTOLIN HFA) 108 (90 Base) MCG/ACT inhaler; Inhale 2 puffs into the lungs every 6 hours as needed for shortness of breath / dyspnea  - fluticasone (FLOVENT HFA) 110 MCG/ACT inhaler; Inhale 2 puffs into the lungs 2 times daily       Follow Up: The patient was instructed to contact clinic for worsening symptoms, non-improvement as expected/discussed, and for questions regarding medications or treatment plan. Discussed parameters for follow up and included in After Visit Summary given to patient.      Return in about 6 months (around 7/13/2021) for Physical Exam.    Diana Georges PA-C  [facility]    Mareilena Hernandez is a 30 year old who presents to clinic today for the following health issues :  HPI       Asthma Follow-Up  Was ACT completed today?    Yes    ACT Total Scores 1/13/2021   ACT TOTAL SCORE -   ASTHMA ER VISITS -   ASTHMA HOSPITALIZATIONS -   ACT TOTAL SCORE (Goal Greater than or Equal to 20) 23   In the past 12 months, how many times did you visit the emergency room for your asthma without being admitted to the hospital? 0   In the past 12 months, how many times were you hospitalized overnight because of your asthma? 0          How many days per week do you miss taking your asthma controller medication?  I do not have an asthma controller medication    Please describe any recent triggers for your asthma:        Patient is just getting over a cold. A few weeks ago. Albuterol helpful.     Have you had any Emergency Room Visits, Urgent Care Visits, or Hospital Admissions since your last office visit?  No    Pt states asthma has been good.  "  Asthma triggers- exercise induced or viral/sickness. Maybe spring allergies.    Sx- wheezing, coughing     Just got over a \"cold\" a few weeks ago. Used albuterol then and it was helpful. Did not need the flovent.     Albuterol use typically- \"rarely\". Less than weekly or monthly.    No nighttime use.    Uses prior to exercise if running.     Flovent - starts to use if she has been needing albuterol more consistently; has used in the springtime .        Review of Systems   Constitutional, HEENT, cardiovascular, pulmonary, gi and gu systems are negative, except as otherwise noted.      Objective           Vitals:  No vitals were obtained today due to virtual visit.    Physical Exam   healthy, alert and no distress  PSYCH: Alert and oriented times 3; coherent speech, normal   rate and volume, able to articulate logical thoughts, able   to abstract reason, no tangential thoughts, no hallucinations   or delusions  Her affect is normal and pleasant  RESP: No cough, no audible wheezing, able to talk in full sentences  Remainder of exam unable to be completed due to telephone visits          Phone call duration: 5 minutes    "

## 2021-01-14 ASSESSMENT — ASTHMA QUESTIONNAIRES: ACT_TOTALSCORE: 23

## 2021-04-23 ENCOUNTER — ANCILLARY PROCEDURE (OUTPATIENT)
Dept: ULTRASOUND IMAGING | Facility: OTHER | Age: 31
End: 2021-04-23
Attending: OBSTETRICS & GYNECOLOGY
Payer: COMMERCIAL

## 2021-04-23 ENCOUNTER — OFFICE VISIT (OUTPATIENT)
Dept: OBGYN | Facility: OTHER | Age: 31
End: 2021-04-23
Payer: COMMERCIAL

## 2021-04-23 VITALS
BODY MASS INDEX: 25.87 KG/M2 | SYSTOLIC BLOOD PRESSURE: 112 MMHG | WEIGHT: 146 LBS | HEIGHT: 63 IN | DIASTOLIC BLOOD PRESSURE: 64 MMHG

## 2021-04-23 DIAGNOSIS — N92.0 MENORRHAGIA WITH REGULAR CYCLE: ICD-10-CM

## 2021-04-23 DIAGNOSIS — N92.0 MENORRHAGIA WITH REGULAR CYCLE: Primary | ICD-10-CM

## 2021-04-23 LAB
HGB BLD-MCNC: 12.4 G/DL (ref 11.7–15.7)
TSH SERPL DL<=0.005 MIU/L-ACNC: 1.07 MU/L (ref 0.4–4)

## 2021-04-23 PROCEDURE — 84443 ASSAY THYROID STIM HORMONE: CPT | Performed by: OBSTETRICS & GYNECOLOGY

## 2021-04-23 PROCEDURE — 99214 OFFICE O/P EST MOD 30 MIN: CPT | Performed by: OBSTETRICS & GYNECOLOGY

## 2021-04-23 PROCEDURE — 85018 HEMOGLOBIN: CPT | Performed by: OBSTETRICS & GYNECOLOGY

## 2021-04-23 PROCEDURE — 36415 COLL VENOUS BLD VENIPUNCTURE: CPT | Performed by: OBSTETRICS & GYNECOLOGY

## 2021-04-23 ASSESSMENT — MIFFLIN-ST. JEOR: SCORE: 1351.38

## 2021-04-23 NOTE — PATIENT INSTRUCTIONS
Please call if you any questions.    [address]   [address]  [phone]        Jailyn Palacios,

## 2021-04-23 NOTE — PROGRESS NOTES
Subjective  31 year old non-pregnant female presents today complaining of menorrhagia.  She will have 2-3 days of bleeding through a super tampon and a pad within an hour and a half.  Her menses are regular, monthly mostly.  One month she had 2 in a month.  She bleeds for 5-6 days.  She gets dizzy and lightheaded during her menses regularly.  We discussed the need to check some labs.  Some dysmenorrhea.  Lower back pain.  No problems urinating.  Normal bowel movements.  Patient is sexually active.  Some dyspareunia since her last delivery.  No vaginal spotting after.  4 c sections with TL.  Patient has used birth control in the past.  Patient is not breast feeding.  We discussed possible causes of her menorrhagia.  I recommended a pelvic ultrasound and the lab work.  We discussed possible treatment options.  We discussed medical vs surgical options.  She has done an oral contractive pills in the past and is wanting to try that.      The use of the oral contraceptive pill has been fully discussed with the patient. This includes the proper method to initiate and continue the pill, the need for regular compliance to ensure adequate contraceptive effect, the physiology which makes the pill effective, the instructions for what to do in event of a missed pill, and warnings about anticipated minor side effects such as breakthrough spotting, nausea, breast tenderness, weight changes, acne, headaches, etc. She was informed of the irregular bleeding pattern that can occur when the pill is first started or a new form is changed over for the first 2-3 months. She has been told of the more serious potential side effects such as MI, stroke, and deep vein thrombosis, all of which are very unlikely. She has been asked to report any signs of such serious problems immediately. She understands and wishes to take the medication as prescribed.  She does not have any apparent contraindications to use.          ROS: 10 point ROS neg other  than the symptoms noted above in the HPI.  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 No family hx of      Hypertension No family hx of      Cerebrovascular Disease No family hx of      Breast Cancer No family hx of      Cancer - colorectal No family hx of      Prostate Cancer No family hx of      Alcohol/Drug No family hx of      Allergies No family hx of      Alzheimer Disease No family hx of      Anesthesia Reaction No family hx of      Arthritis No family hx of      Blood Disease No family hx of      Cardiovascular No family hx of      Circulatory No family hx of      Congenital Anomalies No family hx of      Connective Tissue Disorder No family hx of      Depression No family hx of      Endocrine Disease No family hx of      Eye Disorder No family hx of      Genetic Disorder No family hx of      Gastrointestinal Disease No family hx of      Genitourinary Problems No family hx of      Gynecology No family hx of      Heart Disease No family hx of      Lipids No family hx of      Musculoskeletal Disorder No family hx of      Neurologic Disorder No family hx of      Obesity No family hx of      Osteoporosis No family hx of      Psychotic Disorder No family hx of      Respiratory No family hx of      Family History Negative No family hx of      Hearing Loss No family hx of      Skin Cancer No family hx of      Social History     Tobacco Use     Smoking status: Never Smoker     Smokeless tobacco: Never Used   Substance Use Topics     Alcohol use: Yes     Comment: occ         Objective  Vitals: /64 (BP Location: Right  "arm, Cuff Size: Adult Regular)   Ht 1.6 m (5' 3\")   Wt 66.2 kg (146 lb)   LMP 04/16/2021   Breastfeeding No   BMI 25.86 kg/m    BMI= Body mass index is 25.86 kg/m .    General appearance=well developed, well-nourished female  Gait=normal  Psych=mood is stable, alert and oriented x3  Abd=soft, Nontender/nondistended, no masses, no signs of hernias, no evidence of hepatosplenomegaly  PELVIC:    External genitalia: normal without lesions or masses  Urethral meatus: no lesions or prolapse noted, normal size  Urethra: no masses, non tender  Bladder: non tender, no fullness  Vagina: normal mucosa and rugae, no discharge.  Cervix: normal without lesion, no cervical motion tenderness, healthy, nulliparous  Uterus: small, mobile, nontender.  Adnexa: non tender, without masses  Rectal: deffered  Ext=no clubbing or cyanosis, no swelling      Assessment  1.)  Menorrhagia  2.)  History of 4 c sections      Plan  1.)  Schedule pelvic ultrasound   2.)  Hgb and TSH today  3.)  Oral contractive pills ordered  4.)  Follow-up in 3 months      30 minutes were spent on the date of the encounter doing chart review, history and exam, documentation, and further activities as noted above.      Nursing notes read and reviewed    Jailyn Palacios DO    "

## 2021-04-28 NOTE — PROGRESS NOTES
28 year old  at 37w6d weeks presents to the clinic for a routine prenatal visit.  No concerns.  Complains of feeling more pressure.  No vaginal bleeding, leakage of fluid, or contractions   Fundal height=38cm  ZQKg=606's  CX=3/80/-2  Discussed labor precautions  RTC 1 week  GBS=Negative  ERCS=  Asthma=stable    Date of Surgery:   Type of Anticipated Surgery: RCS  Surgeon: Jailyn Palacios  Type of Anesthesia Anticipated: Spinal                                SUBJECTIVE:  Mary Torres is an 28 year old female here for Preoperative clearance for surgery from operating surgeon Jailyn Palacios.    HPI:   at 37w6d scheduled for RCS on .    Any Aspirin within 10 days? No  Transfusion reactions: No prior transfusions  Bleeding tendencies:No bleeding problems noted    Patient Active Problem List   Diagnosis     CARDIOVASCULAR SCREENING; LDL GOAL LESS THAN 160     Lymph node enlargement     Intermittent asthma     LTBI (latent tuberculosis infection)     Encounter for long-term (current) use of medications     Need for Tdap vaccination     H/O:      Normal pregnancy in second trimester     Past Medical History:   Diagnosis Date     Asthma, intermittent       Current Outpatient Prescriptions   Medication     Prenatal MV-Min-Fe Fum-FA-DHA (PRENATAL 1 PO)     albuterol (PROAIR HFA, PROVENTIL HFA, VENTOLIN HFA) 108 (90 BASE) MCG/ACT inhaler     FLOVENT  MCG/ACT inhaler     No current facility-administered medications for this visit.      No Known Allergies  Past Surgical History:   Procedure Laterality Date      SECTION  13    Failure to descend     ENT SURGERY      wisdom teeth     Family History   Problem Relation Age of Onset     Asthma Mother      Diabetes Father      Thyroid Disease Father      Cancer Paternal Grandmother      lung cancer     Asthma Maternal Grandmother      Asthma Sister      C.A.D. No family hx of      Hypertension No family hx of       Cerebrovascular Disease No family hx of      Breast Cancer No family hx of      Cancer - colorectal No family hx of      Prostate Cancer No family hx of      Alcohol/Drug No family hx of      Allergies No family hx of      Alzheimer Disease No family hx of      Anesthesia Reaction No family hx of      Arthritis No family hx of      Blood Disease No family hx of      Cardiovascular No family hx of      Circulatory No family hx of      Congenital Anomalies No family hx of      Connective Tissue Disorder No family hx of      Depression No family hx of      Endocrine Disease No family hx of      Eye Disorder No family hx of      Genetic Disorder No family hx of      GASTROINTESTINAL DISEASE No family hx of      Genitourinary Problems No family hx of      Gynecology No family hx of      HEART DISEASE No family hx of      Lipids No family hx of      Musculoskeletal Disorder No family hx of      Neurologic Disorder No family hx of      Obesity No family hx of      Osteoperosis No family hx of      Psychotic Disorder No family hx of      Respiratory No family hx of      Family History Negative No family hx of      Hearing Loss No family hx of      No family history of malignant hyperthermia.       REVIEW OF SYSTEMS:  General: negative  Skin: negative  Eyes: negative  Ears/Nose/Throat: negative  Respiratory: No shortness of breath, dyspnea on exertion, cough, or hemoptysis  Cardiovascular: negative  Gastrointestinal: negative  Genitourinary: negative  Musculoskeletal: negative  Neurologic: negative  Psychiatric: negative  Hematologic/Lymphatic/Immunologic: negative  Endocrine: negative       PHYSICAL EXAM:  General Appearance: healthy, alert and no distress  Head: Normocephalic. No masses, lesions, tenderness or abnormalities  Eyes: normal  Ears: negative  Nose: Nares normal  Mouth: Oropharynx normal  Heart:regular rate and rhythm and no murmurs, clicks, or gallops  Lungs: negative, Percussion normal. Good diaphragmatic  excursion. Lungs clear  Abdomen: Abdomen soft, non-tender. BS normal. No masses, organomegaly, gravid  Genitals: Deferred  Extremities: Extremities normal. No deformities, edema, or skin discoloration.  Musculoskeletal: Spine ROM normal. Muscular strength intact.  Skin: Skin color, texture, turgor normal. No rashes or lesions.  Neurological: Gait normal. Reflexes normal and symmetric. Sensation grossly WNL.    Diabetic Instructions Given for Pre-Op Insulin: NOT APPLICABLE      ASSESSMENT:  Preoperative clearance for surgery.       PLAN:  Cleared for surgery: yes          MD Signature: Jailyn Palacios         28-Apr-2021

## 2021-07-20 ENCOUNTER — OFFICE VISIT (OUTPATIENT)
Dept: OBGYN | Facility: CLINIC | Age: 31
End: 2021-07-20
Payer: COMMERCIAL

## 2021-07-20 ENCOUNTER — PREP FOR PROCEDURE (OUTPATIENT)
Dept: OBGYN | Facility: CLINIC | Age: 31
End: 2021-07-20

## 2021-07-20 VITALS
WEIGHT: 143 LBS | HEART RATE: 73 BPM | BODY MASS INDEX: 25.33 KG/M2 | OXYGEN SATURATION: 100 % | SYSTOLIC BLOOD PRESSURE: 111 MMHG | DIASTOLIC BLOOD PRESSURE: 71 MMHG

## 2021-07-20 DIAGNOSIS — N92.1 MENOMETRORRHAGIA: Primary | ICD-10-CM

## 2021-07-20 PROCEDURE — 58100 BIOPSY OF UTERUS LINING: CPT | Performed by: OBSTETRICS & GYNECOLOGY

## 2021-07-20 PROCEDURE — 99213 OFFICE O/P EST LOW 20 MIN: CPT | Mod: 25 | Performed by: OBSTETRICS & GYNECOLOGY

## 2021-07-20 NOTE — H&P (VIEW-ONLY)
Subjective  31 year old non-pregnant female presents today to discuss treatment options.  I saw patient in April complaining of menorrhagia.  She was started on oral contractive pills and she states they have not helped much.  She is bleeding on the third week of the cycle.  She bleeds for 8-9 days.  She is using both pads and tampons.  She will have 2-3 days of bleeding through a super tampon and a pad within an hour and a half.  She gets dizzy and lightheaded during her menses regularly.  Her headaches are during her menses and have gotten worse since being on the pills.  Some dysmenorrhea.  Lower back pain.  No problems urinating.  Normal bowel movements.  Patient is sexually active.  Some dyspareunia since her last delivery.  No vaginal spotting after.  4 c sections with TL.  She had her left ovary removed at the time of the last c section due to bleeding.  No female cancer in her family.  No tobacco abuse.  We discussed other treatment options.  We discussed other medical options vs surgical options.  She is done trying medical options and wants an endometrial ablation.  We reviewed her ultrasound results in detail.  I recommended an endometrial biopsy and patient is wanting this done today.    I discussed risks, benefits, and complications of surgery including but not limited to bleeding, infection, damage to nearby organs including but not limited to bladder, bowel, ureters, nerves, and blood vessels as well as anesthesia risks.  Injury may result at the time of surgery or in a separate procedure.  We also discussed the possibility of a reoperation if the pathology came back abnormal.  All questions answered, and accepting these risks, the patient elects to proceed with the procedure.  We discussed the concern given she has had 4 c sections.      I personally reviewed the ultrasound and the findings were benign.      ROS: 10 point ROS neg other than the symptoms noted above in the HPI.  Past Medical History:    Diagnosis Date     Asthma, intermittent      Past Surgical History:   Procedure Laterality Date      SECTION  13    Failure to descend     ENT SURGERY      wisdom teeth     Family History   Problem Relation Age of Onset     Asthma Mother      Diabetes Father      Thyroid Disease Father      Cancer Paternal Grandmother         lung cancer     Asthma Maternal Grandmother      Asthma Sister      C.A.D. No family hx of      Hypertension No family hx of      Cerebrovascular Disease No family hx of      Breast Cancer No family hx of      Cancer - colorectal No family hx of      Prostate Cancer No family hx of      Alcohol/Drug No family hx of      Allergies No family hx of      Alzheimer Disease No family hx of      Anesthesia Reaction No family hx of      Arthritis No family hx of      Blood Disease No family hx of      Cardiovascular No family hx of      Circulatory No family hx of      Congenital Anomalies No family hx of      Connective Tissue Disorder No family hx of      Depression No family hx of      Endocrine Disease No family hx of      Eye Disorder No family hx of      Genetic Disorder No family hx of      Gastrointestinal Disease No family hx of      Genitourinary Problems No family hx of      Gynecology No family hx of      Heart Disease No family hx of      Lipids No family hx of      Musculoskeletal Disorder No family hx of      Neurologic Disorder No family hx of      Obesity No family hx of      Osteoporosis No family hx of      Psychotic Disorder No family hx of      Respiratory No family hx of      Family History Negative No family hx of      Hearing Loss No family hx of      Skin Cancer No family hx of      Social History     Tobacco Use     Smoking status: Never Smoker     Smokeless tobacco: Never Used   Substance Use Topics     Alcohol use: Yes     Comment: occ         Objective  Vitals: /71 (BP Location: Right arm, Cuff Size: Adult Regular)   Pulse 73   Wt 64.9 kg (143 lb)    LMP 2021   SpO2 100%   Breastfeeding No   BMI 25.33 kg/m    BMI= Body mass index is 25.33 kg/m .    General appearance=well developed, well-nourished female  Gait=normal  Psych=mood is stable, alert and oriented x3  Abd=soft, Nontender/nondistended, no masses, no signs of hernias, no evidence of hepatosplenomegaly  PELVIC:    External genitalia: normal without lesions or masses  Urethral meatus: no lesions or prolapse noted, normal size  Urethra: no masses, non tender  Bladder: non tender, no fullness  Vagina: normal mucosa and rugae, no discharge.  Cervix: normal without lesion, no cervical motion tenderness, healthy  Uterus: small, mobile, nontender.  Adnexa: non tender, without masses  Rectal: deffered  Ext=no clubbing or cyanosis, no swelling      Pelvic ultrasound=2021:  FINDINGS:     UTERUS: 8.1 x 3.1 x 5.9 cm.  scar seen in the anterior lower  uterine segment. Uterus is otherwise normal in size and position with  no masses.     ENDOMETRIUM: 4 mm. Normal smooth endometrium.     RIGHT OVARY: 4.3 x 3.2 x 2.5 cm. Normal in appearance.     LEFT OVARY: Surgically absent..     No significant free fluid.                                                                      IMPRESSION:  1.  No etiology for patient's symptoms is identified on this study.  Patient's left ovary is absent, consistent with surgical history.      Procedure:  Endometrial biopsy    Indication: Menometrorrhagia                    Discussed risk of bleeding, infection, uterine perforation, cramping pain.  Pt agreed to proceed with procedure after all questions answered.    Speculum placed and cervix visualized.  Cervix cleansed with betadine x 3.  Endometrial biopsy pipelle passed through cervix and uterus sounded to 9 cm.  Biopsy specimen collected with one pass with return of moderate amount of pink tissue.  Specimen placed in a labeled container and set aside to be sent to pathology.    No bleeding noted from cervical  os.     Patient tolerated the procedure well.  There were no apparent complications and bleeding was minimal.    She is instructed to use no tampons and have no intercourse for the next 5 days.      Assessment  1.)  Menometrorrhagia  2.)  History of 4 c sections  3.)  S/p left oophorectomy at the time of c section       Plan  1.)  Endometrial biopsy today=need to rule out hyperplasia or malignancy  2.)  Schedule endometrial ablation with [facility]  3.)  Schedule pre-op with PCP  4.)  Schedule COVID if needed       25 minutes were spent on the date of the encounter doing chart review, history and exam, documentation, and further activities as noted above.  This time does not include time spent on the procedure.        Nursing notes read and reviewed    Jailyn Palacios DO

## 2021-07-20 NOTE — PATIENT INSTRUCTIONS
Endometrial Biopsy  Endometrial biopsy is a procedure used to study the endometrium (lining of the uterus). It is usually done in your healthcare provider s office. During the biopsy, small tissue samples are taken from the uterine lining. These are then sent to a lab for study. If any problems are found, you and your healthcare provider will discuss treatment options. The biopsy usually takes less than 20 minutes, and you can often go back to your normal routine as soon as the procedure is over.   Reasons for the Procedure  Endometrial biopsy may help pinpoint the cause of certain problems. These include:    Bleeding after menopause    Heavy or irregular periods    Bleeding associated with hormone replacement therapy    Prolonged bleeding    Abnormal Pap test results    Trouble getting pregnant (fertility problems)    What Are the Risks?  Problems with endometrial biopsy are rare, but can include:    Bleeding    Infection    Damage to the uterine wall (very rare)  Getting Ready for the Procedure  Your doctor will ask about your health and any medications you take, such as blood thinners. Before your biopsy, you may have tests to make sure you re not pregnant or have an infection. You may also be asked to sign a consent form. A day or two before the procedure:     Avoid using creams or other vaginal medications.    Avoid douching.    Ask your healthcare provider if you should take pain medications shortly before the test.   During the Biopsy    You will be asked to lie on an exam table with your knees bent, just as you do for a Pap test.    You may have a brief pelvic exam. An instrument called a speculum is then inserted into the vagina to hold it open.    An antiseptic solution is applied to the cervix. The cervix may also be numbed with an anesthetic or dilated to widen the opening.    A small suction tube is passed through the cervix into the uterus.    It is normal to feel some cramping when the tube is  inserted. But tell your healthcare provider if you have severe cramping or are very uncomfortable.    Using mild suction, samples are taken from the uterine lining. You may feel pinching or additional cramping when this is done.    The tube and speculum are then removed and the samples sent to a lab for study.  After the Procedure    If you feel lightheaded or dizzy, you can rest on the table until you re ready to get dressed.    For a few hours, you may feel some mild cramping. This can usually be relieved with over-the-counter pain medications.    You may have some bleeding for a few days. Use pads instead of tampons.    Don t douche or use any vaginal medications unless your healthcare provider says it s okay.    Ask your healthcare provider when it s okay to have sex again.  Follow-Up  It will take about a week for the biopsy results to come back from the lab. Then you and your healthcare provider can discuss the results. These may show that no treatment is required. Or, you may be scheduled for a follow-up appointment and further tests. If your biopsy was done for fertility problems, be sure to record the day when your next period begins.     Call your healthcare provider if you have:    Heavy bleeding (more than a pad an hour for 2 hours).    Severe cramping, or increasing pain.    Fever over 101 F.    Foul-smelling or unusual vaginal discharge.     What you may expect after an endometrial biopsy:  Mild cramping for less than 48 hours is to be expected, if you have can take ibuprofen or Motrin you may use this for the cramping.   A small amount of bleeding would be considered normal as well.    You may resume your normal activities including sexual intercourse as soon as you feel ready.       WARNING SIGNS:  If you are experiencing:  Fever  Foul smelling vaginal discharge  Cramping lasting longer than 48 hours  Severe cramping  Bleeding heavier than a period  CALL THE CLINIC IMMEDIATELY    You will be  contacted with the results of your biopsy in about one week.   A follow up plan will be made with you when your results are available.     Jailyn Palacios, DO

## 2021-07-21 ENCOUNTER — TELEPHONE (OUTPATIENT)
Dept: OBGYN | Facility: OTHER | Age: 31
End: 2021-07-21

## 2021-07-21 DIAGNOSIS — Z11.59 ENCOUNTER FOR SCREENING FOR OTHER VIRAL DISEASES: ICD-10-CM

## 2021-07-21 NOTE — TELEPHONE ENCOUNTER
Associated Diagnoses    Menometrorrhagia [N92.1]  - Primary       Source Order Set    Order Set Name Order ID    [id number]   Case Request: Case Info    Panel 1    Providers    Provider Role Service   Jailyn Palacios DO Primary Gynecology   Procedures    Procedure Laterality Anesthesia Region   Hysteroscopy dilation and curettage, endometrial ablation with Novasure N/A Monitor Anesthesia Care                   Requested date:    Location:  OR   Patient class:       Pre-op diagnoses:  Menometrorrhagia   Scheduling Instructions    Additional Instructions for the Case   Surgical Assistant: No   Multi Surgeon Case No   H&P:  Pre-op options: PCP   Post-op:  2 weeks   Vendor: No   Surgical time needed: 30 Minutes   ERAS patient: NA     SURGERY SCHEDULING AND PRECERTIFICATION    Medical Record Number: [id number]  Mary Torres  YOB: 1990   Phone: [phone] (home)   Primary Provider: Serena Ref-Primary, Physician    Reason for Admit:  Menometrorrhagia [N92.1]  - Primary           ICD-10 CODE:   Menometrorrhagia [N92.1]  - Primary             Surgeon: Jailyn Palacios DO  Surgical Procedure: Hysteroscopy dilation and curettage, endometrial ablation with Novasure  Date of Surgery  Time of Surgery 9:00 a.m.  Surgery to be performed at:  [facility] [place]   Status: Outpatient  Type of Anesthesia Anticipated: MAC    Sterilization consent:  Not applicable to procedure being performed.    Pre-Op: On  with Marian Calderón 10:00 a.m.  Post-Op:  2 weeks on 9/3 with Dr. Palacios at [place] 4:00 p.m.  COVID testin/16 [facility] 9:45 a.m.    Pre-certification routed to Financial Counselors:  Auto routes via Case Request    Surgery packet mailed to patient's home address: Yes  Patient instructed NPO 12 hours prior to surgery, arrive 1 hour(s) prior to surgery, must have a .  Patient understood and agrees to the plan.      Requestor:  Razia Shane      Location:  [facility] [phone]

## 2021-07-22 LAB
PATH REPORT.COMMENTS IMP SPEC: NORMAL
PATH REPORT.COMMENTS IMP SPEC: NORMAL
PATH REPORT.FINAL DX SPEC: NORMAL
PATH REPORT.GROSS SPEC: NORMAL
PATH REPORT.MICROSCOPIC SPEC OTHER STN: NORMAL
PATH REPORT.RELEVANT HX SPEC: NORMAL
PHOTO IMAGE: NORMAL

## 2021-07-22 PROCEDURE — 88305 TISSUE EXAM BY PATHOLOGIST: CPT | Performed by: PATHOLOGY

## 2021-08-12 ENCOUNTER — TELEPHONE (OUTPATIENT)
Dept: SURGERY | Facility: CLINIC | Age: 31
End: 2021-08-12

## 2021-08-12 NOTE — TELEPHONE ENCOUNTER
PREVISIT INFORMATION                                                    Mary ZABALA Andrelydia scheduled for future visit at [facility] specialty clinics.    Patient is scheduled to see   on 8/13  Reason for visit: consult tummy tuck   Referring provider none   Has patient seen previous specialist? No  Medical Records:  Available in chart.  Patient was previously seen at a [facility] or [facility] facility.    REVIEW                                                      New patient packet mailed to patient: No  Medication reconciliation complete: No      Current Outpatient Medications   Medication Sig Dispense Refill     albuterol (PROAIR HFA/PROVENTIL HFA/VENTOLIN HFA) 108 (90 Base) MCG/ACT inhaler Inhale 2 puffs into the lungs every 6 hours as needed for shortness of breath / dyspnea 1 Inhaler 3     fluticasone (FLOVENT HFA) 110 MCG/ACT inhaler Inhale 2 puffs into the lungs 2 times daily 1 Inhaler 1     norethindrone-ethinyl estradiol (ORTHO-NOVUM) 1-35 MG-MCG tablet Take 1 tablet by mouth daily (Patient not taking: Reported on 7/20/2021) 84 tablet 1       Allergies: Patient has no known allergies.        PLAN/FOLLOW-UP NEEDED                                                      Previsit review complete.  Patient will see provider at future scheduled appointment.     Patient Reminders Given:  Please, make sure you bring an updated list of your medications.   If you are having a procedure, please, present 15 minutes early.  If you need to cancel or reschedule,please call [phone].    Donna Angulo RN

## 2021-08-13 ENCOUNTER — OFFICE VISIT (OUTPATIENT)
Dept: SURGERY | Facility: CLINIC | Age: 31
End: 2021-08-13
Payer: COMMERCIAL

## 2021-08-13 VITALS — BODY MASS INDEX: 24.98 KG/M2 | HEIGHT: 63 IN | WEIGHT: 141 LBS

## 2021-08-13 DIAGNOSIS — N64.81 BREAST PTOSIS: Primary | ICD-10-CM

## 2021-08-13 PROCEDURE — 99203 OFFICE O/P NEW LOW 30 MIN: CPT | Performed by: PLASTIC SURGERY

## 2021-08-13 ASSESSMENT — MIFFLIN-ST. JEOR: SCORE: 1328.7

## 2021-08-13 NOTE — LETTER
2021         RE: Mary Torres  [address]  [facility] [address]        Dear Colleague,    Thank you for referring your patient, Mary Torres, to the [facility]. Please see a copy of my visit note below.    CONSULT NEW PATIENT VISIT    PRESENTING COMPLAINT:  Breast augmentation and abdominoplasty.    HISTORY OF PRESENTING COMPLAINT:  Mary is 31 years old, here to discuss possible breast augmentation and tummy tuck.  She has had 4 children, lost 50 pounds and has been stable at her weight of 140, which is her goal weight, for at least 6 months.  She has no family or personal history of breast cancer.  She wears a cup.  She would like to be a full B small/C cup.  She would like basically fill out the excess skin in her chest, but not be too large.  She is unhappy with the excess tissue in mid and lower abdominal regions.    PAST MEDICAL HISTORY:  Nil.    PAST MEDICAL HISTORY:  Nil.    PAST SURGICAL HISTORY:  .    MEDICATIONS:  Nil.    ALLERGIES:  Nil.    SOCIAL HISTORY:  Does not smoke, socially drinks.  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    REVIEW OF SYSTEMS:  Denies chest pain, shortness of breath, MI, CVA, DVT and PE.    PHYSICAL EXAMINATION:  Vital signs stable.  She is afebrile, in no obvious distress.  She is 5 feet 3 inches, 140 pounds, BMI of 25 kg/m2.  On examination of her breasts, she has deflated breasts, grade 0 to grade 1 ptosis.  Right breast is smaller than the left.  The left side is longer and more developed IMF with more excess skin compared to the right side.  Her abdomen has a lot of loose skin in her mid and lower abdominal region with some stretch marks.  Skin adiposity was approximately 3 cm.  No obvious hernia and firm abdominal wall.    ASSESSMENT AND PLAN:  Based on above findings, a diagnosis of weight loss and postpartum breast tissue loss along with excess skin in the abdomen was made.  I think she is a great candidate for  breast augmentation, possible small mastopexy on the left and then a cosmetic abdominoplasty.      We went over each of these procedures in detail with the patient, had a wilson conversation about sizes to use in her breast augmentation and the anthropometric measurements compared to the size that she wants and the balance between those two.  The fact that her left breast has more skin and slightly more ptotic means that she may require a little bit of a lift/tightening of the skin envelope on the left side.  Therefore, my aim would be to first to augment the right side, fill out the excess skin and then match the left side with or without a mastopexy.      Went over the planned procedure of an abdominoplasty.  She has a nice waist already.  I do not think liposuction is needed in the flanks.  I did explain to her the expectations of a tummy tuck.  She will lose some of the tattoo she has in her right flank and right groin region.      All risks, benefits, and alternatives of these different procedures including pain, infection, bleeding, scarring, asymmetry, seromas, hematomas, wound breakdown, wound dehiscence, contour irregularities, asymmetry, sensation loss, capsular contracture, visibility and palpability of implants rippling, requirement of further surgeries in the future, re-loosening over time, DVT, PE, MI, CVA, pneumonia, renal failure and death.  She understood everything and wants to proceed.  She understands her implants are temporary and will require exchanges in the future.      All questions were answered.  She was happy with the visit.  I look forward to helping her out in the near future.  Consent obtained for pictures.  Quotes for the procedures were given.    Total time spent with chart review, visit itself and post-visit with paperwork was 30 minutes.            Again, thank you for allowing me to participate in the care of your patient.        Sincerely,        CARINA Price MD

## 2021-08-13 NOTE — PROGRESS NOTES
CONSULT NEW PATIENT VISIT    PRESENTING COMPLAINT:  Breast augmentation and abdominoplasty.    HISTORY OF PRESENTING COMPLAINT:  Mary is 31 years old, here to discuss possible breast augmentation and tummy tuck.  She has had 4 children, lost 50 pounds and has been stable at her weight of 140, which is her goal weight, for at least 6 months.  She has no family or personal history of breast cancer.  She wears a cup.  She would like to be a full B small/C cup.  She would like basically fill out the excess skin in her chest, but not be too large.  She is unhappy with the excess tissue in mid and lower abdominal regions.    PAST MEDICAL HISTORY:  Nil.    PAST MEDICAL HISTORY:  Nil.    PAST SURGICAL HISTORY:  .    MEDICATIONS:  Nil.    ALLERGIES:  Nil.    SOCIAL HISTORY:  Does not smoke, socially drinks.  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    REVIEW OF SYSTEMS:  Denies chest pain, shortness of breath, MI, CVA, DVT and PE.    PHYSICAL EXAMINATION:  Vital signs stable.  She is afebrile, in no obvious distress.  She is 5 feet 3 inches, 140 pounds, BMI of 25 kg/m2.  On examination of her breasts, she has deflated breasts, grade 0 to grade 1 ptosis.  Right breast is smaller than the left.  The left side is longer and more developed IMF with more excess skin compared to the right side.  Her abdomen has a lot of loose skin in her mid and lower abdominal region with some stretch marks.  Skin adiposity was approximately 3 cm.  No obvious hernia and firm abdominal wall.    ASSESSMENT AND PLAN:  Based on above findings, a diagnosis of weight loss and postpartum breast tissue loss along with excess skin in the abdomen was made.  I think she is a great candidate for breast augmentation, possible small mastopexy on the left and then a cosmetic abdominoplasty.      We went over each of these procedures in detail with the patient, had a wilson conversation about sizes to use in her breast augmentation and the  anthropometric measurements compared to the size that she wants and the balance between those two.  The fact that her left breast has more skin and slightly more ptotic means that she may require a little bit of a lift/tightening of the skin envelope on the left side.  Therefore, my aim would be to first to augment the right side, fill out the excess skin and then match the left side with or without a mastopexy.      Went over the planned procedure of an abdominoplasty.  She has a nice waist already.  I do not think liposuction is needed in the flanks.  I did explain to her the expectations of a tummy tuck.  She will lose some of the tattoo she has in her right flank and right groin region.      All risks, benefits, and alternatives of these different procedures including pain, infection, bleeding, scarring, asymmetry, seromas, hematomas, wound breakdown, wound dehiscence, contour irregularities, asymmetry, sensation loss, capsular contracture, visibility and palpability of implants rippling, requirement of further surgeries in the future, re-loosening over time, DVT, PE, MI, CVA, pneumonia, renal failure and death.  She understood everything and wants to proceed.  She understands her implants are temporary and will require exchanges in the future.      All questions were answered.  She was happy with the visit.  I look forward to helping her out in the near future.  Consent obtained for pictures.  Quotes for the procedures were given.    Total time spent with chart review, visit itself and post-visit with paperwork was 30 minutes.

## 2021-08-13 NOTE — NURSING NOTE
"Mary Torres's goals for this visit include:   Chief Complaint   Patient presents with     Consult     abdominoplasty and breast augmentation        She requests these members of her care team be copied on today's visit information: no    PCP: No Ref-Primary, Physician    Referring Provider:  No referring provider defined for this encounter.    Ht 1.6 m (5' 3\")   Wt 64 kg (141 lb)   BMI 24.98 kg/m      Do you need any medication refills at today's visit? No..Donna Angulo RN      "

## 2021-08-13 NOTE — NURSING NOTE
Per Dr. Price bilateral breast and abdominal pictures to be taken. Pt identifier picture taken first, then arms to sides, arms on hips, 45 and 90 degree angle on both sides. Also per  pt to be given a quote for a tummy tuck 2-1/2 hours and Breast augmentation silicone implants Tier 2 1-1/2 hours. All of the above done while pt still in clinic....Donna Angulo RN

## 2021-08-16 ENCOUNTER — LAB (OUTPATIENT)
Dept: LAB | Facility: OTHER | Age: 31
End: 2021-08-16
Payer: COMMERCIAL

## 2021-08-16 ENCOUNTER — OFFICE VISIT (OUTPATIENT)
Dept: FAMILY MEDICINE | Facility: OTHER | Age: 31
End: 2021-08-16
Payer: COMMERCIAL

## 2021-08-16 VITALS
HEIGHT: 65 IN | WEIGHT: 140 LBS | BODY MASS INDEX: 23.32 KG/M2 | OXYGEN SATURATION: 98 % | SYSTOLIC BLOOD PRESSURE: 110 MMHG | RESPIRATION RATE: 20 BRPM | TEMPERATURE: 98.1 F | HEART RATE: 75 BPM | DIASTOLIC BLOOD PRESSURE: 68 MMHG

## 2021-08-16 DIAGNOSIS — N92.0 MENORRHAGIA WITH REGULAR CYCLE: ICD-10-CM

## 2021-08-16 DIAGNOSIS — Z01.818 PRE-OPERATIVE GENERAL PHYSICAL EXAMINATION: Primary | ICD-10-CM

## 2021-08-16 DIAGNOSIS — Z11.59 ENCOUNTER FOR SCREENING FOR OTHER VIRAL DISEASES: ICD-10-CM

## 2021-08-16 DIAGNOSIS — J45.20 MILD INTERMITTENT ASTHMA WITHOUT COMPLICATION: ICD-10-CM

## 2021-08-16 PROBLEM — Z98.891 S/P CESAREAN SECTION: Status: ACTIVE | Noted: 2020-06-03

## 2021-08-16 LAB
ANION GAP SERPL CALCULATED.3IONS-SCNC: 4 MMOL/L (ref 3–14)
BASOPHILS # BLD AUTO: 0 10E3/UL (ref 0–0.2)
BASOPHILS NFR BLD AUTO: 1 %
BUN SERPL-MCNC: 11 MG/DL (ref 7–30)
CALCIUM SERPL-MCNC: 9.1 MG/DL (ref 8.5–10.1)
CHLORIDE BLD-SCNC: 103 MMOL/L (ref 94–109)
CO2 SERPL-SCNC: 31 MMOL/L (ref 20–32)
CREAT SERPL-MCNC: 0.67 MG/DL (ref 0.52–1.04)
EOSINOPHIL # BLD AUTO: 0.2 10E3/UL (ref 0–0.7)
EOSINOPHIL NFR BLD AUTO: 3 %
ERYTHROCYTE [DISTWIDTH] IN BLOOD BY AUTOMATED COUNT: 12.5 % (ref 10–15)
GFR SERPL CREATININE-BSD FRML MDRD: >90 ML/MIN/1.73M2
GLUCOSE BLD-MCNC: 85 MG/DL (ref 70–99)
HCT VFR BLD AUTO: 37.3 % (ref 35–47)
HGB BLD-MCNC: 12.7 G/DL (ref 11.7–15.7)
LYMPHOCYTES # BLD AUTO: 2.2 10E3/UL (ref 0.8–5.3)
LYMPHOCYTES NFR BLD AUTO: 37 %
MCH RBC QN AUTO: 30.3 PG (ref 26.5–33)
MCHC RBC AUTO-ENTMCNC: 34 G/DL (ref 31.5–36.5)
MCV RBC AUTO: 89 FL (ref 78–100)
MONOCYTES # BLD AUTO: 0.5 10E3/UL (ref 0–1.3)
MONOCYTES NFR BLD AUTO: 8 %
NEUTROPHILS # BLD AUTO: 3 10E3/UL (ref 1.6–8.3)
NEUTROPHILS NFR BLD AUTO: 51 %
PLATELET # BLD AUTO: 247 10E3/UL (ref 150–450)
POTASSIUM BLD-SCNC: 3.6 MMOL/L (ref 3.4–5.3)
RBC # BLD AUTO: 4.19 10E6/UL (ref 3.8–5.2)
SARS-COV-2 RNA RESP QL NAA+PROBE: NEGATIVE
SODIUM SERPL-SCNC: 138 MMOL/L (ref 133–144)
WBC # BLD AUTO: 5.9 10E3/UL (ref 4–11)

## 2021-08-16 PROCEDURE — 99214 OFFICE O/P EST MOD 30 MIN: CPT | Performed by: NURSE PRACTITIONER

## 2021-08-16 PROCEDURE — U0005 INFEC AGEN DETEC AMPLI PROBE: HCPCS

## 2021-08-16 PROCEDURE — 85025 COMPLETE CBC W/AUTO DIFF WBC: CPT | Performed by: NURSE PRACTITIONER

## 2021-08-16 PROCEDURE — 80048 BASIC METABOLIC PNL TOTAL CA: CPT | Performed by: NURSE PRACTITIONER

## 2021-08-16 PROCEDURE — 36415 COLL VENOUS BLD VENIPUNCTURE: CPT | Performed by: NURSE PRACTITIONER

## 2021-08-16 PROCEDURE — U0003 INFECTIOUS AGENT DETECTION BY NUCLEIC ACID (DNA OR RNA); SEVERE ACUTE RESPIRATORY SYNDROME CORONAVIRUS 2 (SARS-COV-2) (CORONAVIRUS DISEASE [COVID-19]), AMPLIFIED PROBE TECHNIQUE, MAKING USE OF HIGH THROUGHPUT TECHNOLOGIES AS DESCRIBED BY CMS-2020-01-R: HCPCS

## 2021-08-16 ASSESSMENT — PAIN SCALES - GENERAL: PAINLEVEL: NO PAIN (0)

## 2021-08-16 ASSESSMENT — MIFFLIN-ST. JEOR: SCORE: 1359.04

## 2021-08-16 NOTE — PROGRESS NOTES
[address]  [facility] [address]  Phone: [phone]  Primary Provider: No Ref-Primary, Physician  Pre-op Performing Provider: BOO TORRES      PREOPERATIVE EVALUATION:  Today's date: 8/16/2021    Mary Torres is a 31 year old female who presents for a preoperative evaluation.    Surgical Information:  Surgery/Procedure:Hysteroscopy dilation and curettage, endometrial ablation with Novasure  Surgery Location:  OR  Surgeon: Jailyn Palacios DO  Surgery Date: 8/19/21  Time of Surgery: 9:10 AM  Where patient plans to recover: At home with family  Fax number for surgical facility:     Type of Anesthesia Anticipated: General    Assessment & Plan     The proposed surgical procedure is considered INTERMEDIATE risk.    Pre-operative general physical examination    - CBC with platelets and differential; Future  - Basic metabolic panel  (Ca, Cl, CO2, Creat, Gluc, K, Na, BUN); Future    Menorrhagia with regular cycle  - Patient has been having heavy periods.   - Plans for ablation per gynecology   - CBC with platelets and differential; Future  - Basic metabolic panel  (Ca, Cl, CO2, Creat, Gluc, K, Na, BUN); Future    Mild intermittent asthma without complication  - Stable on medication.            Risks and Recommendations:  The patient has the following additional risks and recommendations for perioperative complications:  Pulmonary:    - Incentive spirometry post-op    Medication Instructions:   - LABA, inhaled corticosteroid, long-acting anticholinergics: Continue without modification.   - rescue Inhaler: Continue PRN. Bring to hospital on the day of surgery.    RECOMMENDATION:  APPROVAL GIVEN to proceed with proposed procedure, without further diagnostic evaluation.          Subjective      HPI related to upcoming procedure:     Patient has been having heavy periods since her last child. Plans for ablation.     Preop Questions 8/16/2021   1. Have  you ever had a heart attack or stroke? No   2. Have you ever had surgery on your heart or blood vessels, such as a stent placement, a coronary artery bypass, or surgery on an artery in your head, neck, heart, or legs? No   3. Do you have chest pain with activity? No   4. Do you have a history of  heart failure? No   5. Do you currently have a cold, bronchitis or symptoms of other infection? No   6. Do you have a cough, shortness of breath, or wheezing? No   7. Do you or anyone in your family have previous history of blood clots? No   8. Do you or does anyone in your family have a serious bleeding problem such as prolonged bleeding following surgeries or cuts? No   9. Have you ever had problems with anemia or been told to take iron pills? YES - stable    10. Have you had any abnormal blood loss such as black, tarry or bloody stools, or abnormal vaginal bleeding? No   11. Have you ever had a blood transfusion? No   12. Are you willing to have a blood transfusion if it is medically needed before, during, or after your surgery? Yes   13. Have you or any of your relatives ever had problems with anesthesia? No   14. Do you have sleep apnea, excessive snoring or daytime drowsiness? No   15. Do you have any artifical heart valves or other implanted medical devices like a pacemaker, defibrillator, or continuous glucose monitor? No   16. Do you have artificial joints? No   17. Are you allergic to latex? No   18. Is there any chance that you may be pregnant? No       Health Care Directive:  Patient does not have a Health Care Directive or Living Will: Discussed advance care planning with patient; however, patient declined at this time.    Preoperative Review of :   reviewed - no record of controlled substances prescribed.      Status of Chronic Conditions:  ASTHMA - Patient has a longstanding history of moderate-severe Asthma . Patient has been doing well overall noting NO SYMPTOMS and continues on medication regimen  consisting of Albut without adverse reactions or side effects.       Review of Systems  Constitutional, neuro, ENT, endocrine, pulmonary, cardiac, gastrointestinal, genitourinary, musculoskeletal, integument and psychiatric systems are negative, except as otherwise noted.    Patient Active Problem List    Diagnosis Date Noted     Menorrhagia with regular cycle 2021     Priority: Medium     S/P  section 2020     Priority: Medium     LTBI (latent tuberculosis infection) 10/18/2012     Priority: Medium     Intermittent asthma 10/09/2012     Priority: Medium     CARDIOVASCULAR SCREENING; LDL GOAL LESS THAN 160 04/10/2012     Priority: Medium      Past Medical History:   Diagnosis Date     Asthma, intermittent      Past Surgical History:   Procedure Laterality Date      SECTION  13    Failure to descend     ENT SURGERY      wisdom teeth     Current Outpatient Medications   Medication Sig Dispense Refill     albuterol (PROAIR HFA/PROVENTIL HFA/VENTOLIN HFA) 108 (90 Base) MCG/ACT inhaler Inhale 2 puffs into the lungs every 6 hours as needed for shortness of breath / dyspnea 1 Inhaler 3     fluticasone (FLOVENT HFA) 110 MCG/ACT inhaler Inhale 2 puffs into the lungs 2 times daily 1 Inhaler 1       No Known Allergies     Social History     Tobacco Use     Smoking status: Never Smoker     Smokeless tobacco: Never Used   Substance Use Topics     Alcohol use: Yes     Comment: occ     Family History   Problem Relation Age of Onset     Asthma Mother      Diabetes Father      Thyroid Disease Father      Cancer Paternal Grandmother         lung cancer     Asthma Maternal Grandmother      Asthma Sister      C.A.D. No family hx of      Hypertension No family hx of      Cerebrovascular Disease No family hx of      Breast Cancer No family hx of      Cancer - colorectal No family hx of      Prostate Cancer No family hx of      Alcohol/Drug No family hx of      Allergies No family hx of      Alzheimer  "Disease No family hx of      Anesthesia Reaction No family hx of      Arthritis No family hx of      Blood Disease No family hx of      Cardiovascular No family hx of      Circulatory No family hx of      Congenital Anomalies No family hx of      Connective Tissue Disorder No family hx of      Depression No family hx of      Endocrine Disease No family hx of      Eye Disorder No family hx of      Genetic Disorder No family hx of      Gastrointestinal Disease No family hx of      Genitourinary Problems No family hx of      Gynecology No family hx of      Heart Disease No family hx of      Lipids No family hx of      Musculoskeletal Disorder No family hx of      Neurologic Disorder No family hx of      Obesity No family hx of      Osteoporosis No family hx of      Psychotic Disorder No family hx of      Respiratory No family hx of      Family History Negative No family hx of      Hearing Loss No family hx of      Skin Cancer No family hx of      History   Drug Use No         Objective     /68   Pulse 75   Temp 98.1  F (36.7  C) (Temporal)   Resp 20   Ht 1.656 m (5' 5.2\")   Wt 63.5 kg (140 lb)   LMP 08/09/2021 (LMP Unknown)   SpO2 98%   BMI 23.16 kg/m      Physical Exam    GENERAL APPEARANCE: healthy, alert and no distress     EYES: EOMI, PERRL     HENT: ear canals and TM's normal and nose and mouth without ulcers or lesions     NECK: no adenopathy, no asymmetry, masses, or scars and thyroid normal to palpation     RESP: lungs clear to auscultation - no rales, rhonchi or wheezes     CV: regular rates and rhythm, normal S1 S2, no S3 or S4 and no murmur, click or rub     ABDOMEN:  soft, nontender, no HSM or masses and bowel sounds normal     MS: extremities normal- no gross deformities noted, no evidence of inflammation in joints, FROM in all extremities.     SKIN: no suspicious lesions or rashes     NEURO: Normal strength and tone, sensory exam grossly normal, mentation intact and speech normal     PSYCH: " mentation appears normal. and affect normal/bright     LYMPHATICS: No cervical adenopathy    Recent Labs   Lab Test 04/23/21  1052 03/23/20  0836 11/14/19  1842 10/14/19  0932   HGB 12.4 12.0 11.7 12.9   PLT  --  209 230 270   NA  --  138  --  136   POTASSIUM  --  3.9  --  3.8   CR  --  0.48*  --  0.55        Diagnostics:  Recent Results (from the past 24 hour(s))   CBC with platelets and differential    Collection Time: 08/16/21 10:08 AM   Result Value Ref Range    WBC Count 5.9 4.0 - 11.0 10e3/uL    RBC Count 4.19 3.80 - 5.20 10e6/uL    Hemoglobin 12.7 11.7 - 15.7 g/dL    Hematocrit 37.3 35.0 - 47.0 %    MCV 89 78 - 100 fL    MCH 30.3 26.5 - 33.0 pg    MCHC 34.0 31.5 - 36.5 g/dL    RDW 12.5 10.0 - 15.0 %    Platelet Count 247 150 - 450 10e3/uL    % Neutrophils 51 %    % Lymphocytes 37 %    % Monocytes 8 %    % Eosinophils 3 %    % Basophils 1 %    Absolute Neutrophils 3.0 1.6 - 8.3 10e3/uL    Absolute Lymphocytes 2.2 0.8 - 5.3 10e3/uL    Absolute Monocytes 0.5 0.0 - 1.3 10e3/uL    Absolute Eosinophils 0.2 0.0 - 0.7 10e3/uL    Absolute Basophils 0.0 0.0 - 0.2 10e3/uL      No EKG required, no history of coronary heart disease, significant arrhythmia, peripheral arterial disease or other structural heart disease.    Revised Cardiac Risk Index (RCRI):  The patient has the following serious cardiovascular risks for perioperative complications:   - No serious cardiac risks = 0 points     RCRI Interpretation: 0 points: Class I (very low risk - 0.4% complication rate)           Signed Electronically by: ISELA Shankar CNP  Copy of this evaluation report is provided to requesting physician.

## 2021-08-18 ENCOUNTER — ANESTHESIA EVENT (OUTPATIENT)
Dept: SURGERY | Facility: AMBULATORY SURGERY CENTER | Age: 31
End: 2021-08-18
Payer: COMMERCIAL

## 2021-08-19 ENCOUNTER — ANESTHESIA (OUTPATIENT)
Dept: SURGERY | Facility: AMBULATORY SURGERY CENTER | Age: 31
End: 2021-08-19
Payer: COMMERCIAL

## 2021-08-19 ENCOUNTER — HOSPITAL ENCOUNTER (OUTPATIENT)
Facility: AMBULATORY SURGERY CENTER | Age: 31
End: 2021-08-19
Attending: OBSTETRICS & GYNECOLOGY | Admitting: OBSTETRICS & GYNECOLOGY
Payer: COMMERCIAL

## 2021-08-19 VITALS
RESPIRATION RATE: 16 BRPM | DIASTOLIC BLOOD PRESSURE: 57 MMHG | TEMPERATURE: 97.2 F | HEART RATE: 62 BPM | OXYGEN SATURATION: 100 % | SYSTOLIC BLOOD PRESSURE: 116 MMHG

## 2021-08-19 DIAGNOSIS — N92.1 MENOMETRORRHAGIA: ICD-10-CM

## 2021-08-19 DIAGNOSIS — Z98.890 S/P ENDOMETRIAL ABLATION: ICD-10-CM

## 2021-08-19 LAB — HCG UR QL: NEGATIVE

## 2021-08-19 PROCEDURE — 81025 URINE PREGNANCY TEST: CPT | Performed by: OBSTETRICS & GYNECOLOGY

## 2021-08-19 PROCEDURE — 58563 HYSTEROSCOPY ABLATION: CPT

## 2021-08-19 PROCEDURE — 58563 HYSTEROSCOPY ABLATION: CPT | Performed by: OBSTETRICS & GYNECOLOGY

## 2021-08-19 PROCEDURE — G8907 PT DOC NO EVENTS ON DISCHARG: HCPCS

## 2021-08-19 PROCEDURE — G8918 PT W/O PREOP ORDER IV AB PRO: HCPCS

## 2021-08-19 PROCEDURE — 88305 TISSUE EXAM BY PATHOLOGIST: CPT | Performed by: PATHOLOGY

## 2021-08-19 RX ORDER — ONDANSETRON 4 MG/1
4 TABLET, ORALLY DISINTEGRATING ORAL EVERY 30 MIN PRN
Status: DISCONTINUED | OUTPATIENT
Start: 2021-08-19 | End: 2021-08-20 | Stop reason: HOSPADM

## 2021-08-19 RX ORDER — ACETAMINOPHEN 325 MG/1
975 TABLET ORAL ONCE
Status: DISCONTINUED | OUTPATIENT
Start: 2021-08-19 | End: 2021-08-20 | Stop reason: HOSPADM

## 2021-08-19 RX ORDER — FENTANYL CITRATE 50 UG/ML
25-50 INJECTION, SOLUTION INTRAMUSCULAR; INTRAVENOUS EVERY 5 MIN PRN
Status: ACTIVE | OUTPATIENT
Start: 2021-08-19 | End: 2021-08-19

## 2021-08-19 RX ORDER — KETOROLAC TROMETHAMINE 30 MG/ML
30 INJECTION, SOLUTION INTRAMUSCULAR; INTRAVENOUS EVERY 6 HOURS PRN
Status: DISCONTINUED | OUTPATIENT
Start: 2021-08-19 | End: 2021-08-20 | Stop reason: HOSPADM

## 2021-08-19 RX ORDER — ACETAMINOPHEN 325 MG/1
975 TABLET ORAL ONCE
Status: COMPLETED | OUTPATIENT
Start: 2021-08-19 | End: 2021-08-19

## 2021-08-19 RX ORDER — IBUPROFEN 800 MG/1
800 TABLET, FILM COATED ORAL EVERY 6 HOURS PRN
Qty: 30 TABLET | Refills: 0 | Status: SHIPPED | OUTPATIENT
Start: 2021-08-19 | End: 2022-01-10

## 2021-08-19 RX ORDER — HYDROCODONE BITARTRATE AND ACETAMINOPHEN 5; 325 MG/1; MG/1
1-2 TABLET ORAL EVERY 4 HOURS PRN
Qty: 5 TABLET | Refills: 0 | Status: SHIPPED | OUTPATIENT
Start: 2021-08-19 | End: 2021-08-22

## 2021-08-19 RX ORDER — ONDANSETRON 2 MG/ML
4 INJECTION INTRAMUSCULAR; INTRAVENOUS EVERY 30 MIN PRN
Status: DISCONTINUED | OUTPATIENT
Start: 2021-08-19 | End: 2021-08-20 | Stop reason: HOSPADM

## 2021-08-19 RX ORDER — FENTANYL CITRATE 50 UG/ML
25 INJECTION, SOLUTION INTRAMUSCULAR; INTRAVENOUS EVERY 5 MIN PRN
Status: DISCONTINUED | OUTPATIENT
Start: 2021-08-19 | End: 2021-08-20 | Stop reason: HOSPADM

## 2021-08-19 RX ORDER — IBUPROFEN 400 MG/1
800 TABLET, FILM COATED ORAL ONCE
Status: DISCONTINUED | OUTPATIENT
Start: 2021-08-19 | End: 2021-08-20 | Stop reason: HOSPADM

## 2021-08-19 RX ORDER — FENTANYL CITRATE 50 UG/ML
INJECTION, SOLUTION INTRAMUSCULAR; INTRAVENOUS PRN
Status: DISCONTINUED | OUTPATIENT
Start: 2021-08-19 | End: 2021-08-19

## 2021-08-19 RX ORDER — DIAZEPAM 10 MG/2ML
2.5 INJECTION, SOLUTION INTRAMUSCULAR; INTRAVENOUS
Status: DISCONTINUED | OUTPATIENT
Start: 2021-08-19 | End: 2021-08-20 | Stop reason: HOSPADM

## 2021-08-19 RX ORDER — SODIUM CHLORIDE, SODIUM LACTATE, POTASSIUM CHLORIDE, CALCIUM CHLORIDE 600; 310; 30; 20 MG/100ML; MG/100ML; MG/100ML; MG/100ML
INJECTION, SOLUTION INTRAVENOUS CONTINUOUS
Status: DISCONTINUED | OUTPATIENT
Start: 2021-08-19 | End: 2021-08-20 | Stop reason: HOSPADM

## 2021-08-19 RX ORDER — ALBUTEROL SULFATE 0.83 MG/ML
2.5 SOLUTION RESPIRATORY (INHALATION) EVERY 4 HOURS PRN
Status: DISCONTINUED | OUTPATIENT
Start: 2021-08-19 | End: 2021-08-20 | Stop reason: HOSPADM

## 2021-08-19 RX ORDER — LIDOCAINE HYDROCHLORIDE 20 MG/ML
INJECTION, SOLUTION INFILTRATION; PERINEURAL PRN
Status: DISCONTINUED | OUTPATIENT
Start: 2021-08-19 | End: 2021-08-19

## 2021-08-19 RX ORDER — MEPERIDINE HYDROCHLORIDE 25 MG/ML
12.5 INJECTION INTRAMUSCULAR; INTRAVENOUS; SUBCUTANEOUS
Status: DISCONTINUED | OUTPATIENT
Start: 2021-08-19 | End: 2021-08-20 | Stop reason: HOSPADM

## 2021-08-19 RX ORDER — PROPOFOL 10 MG/ML
INJECTION, EMULSION INTRAVENOUS CONTINUOUS PRN
Status: DISCONTINUED | OUTPATIENT
Start: 2021-08-19 | End: 2021-08-19

## 2021-08-19 RX ORDER — OXYCODONE HYDROCHLORIDE 5 MG/1
5 TABLET ORAL
Status: DISCONTINUED | OUTPATIENT
Start: 2021-08-19 | End: 2021-08-20 | Stop reason: HOSPADM

## 2021-08-19 RX ORDER — OXYCODONE HYDROCHLORIDE 5 MG/1
5-10 TABLET ORAL EVERY 4 HOURS PRN
Status: DISCONTINUED | OUTPATIENT
Start: 2021-08-19 | End: 2021-08-20 | Stop reason: HOSPADM

## 2021-08-19 RX ORDER — LIDOCAINE 40 MG/G
CREAM TOPICAL
Status: DISCONTINUED | OUTPATIENT
Start: 2021-08-19 | End: 2021-08-20 | Stop reason: HOSPADM

## 2021-08-19 RX ORDER — LABETALOL HYDROCHLORIDE 5 MG/ML
10 INJECTION, SOLUTION INTRAVENOUS
Status: DISCONTINUED | OUTPATIENT
Start: 2021-08-19 | End: 2021-08-20 | Stop reason: HOSPADM

## 2021-08-19 RX ADMIN — PROPOFOL 150 MCG/KG/MIN: 10 INJECTION, EMULSION INTRAVENOUS at 09:02

## 2021-08-19 RX ADMIN — ACETAMINOPHEN 975 MG: 325 TABLET ORAL at 08:14

## 2021-08-19 RX ADMIN — SODIUM CHLORIDE, SODIUM LACTATE, POTASSIUM CHLORIDE, CALCIUM CHLORIDE: 600; 310; 30; 20 INJECTION, SOLUTION INTRAVENOUS at 09:01

## 2021-08-19 RX ADMIN — LIDOCAINE HYDROCHLORIDE 60 MG: 20 INJECTION, SOLUTION INFILTRATION; PERINEURAL at 09:02

## 2021-08-19 RX ADMIN — FENTANYL CITRATE 50 MCG: 50 INJECTION, SOLUTION INTRAMUSCULAR; INTRAVENOUS at 09:02

## 2021-08-19 NOTE — INTERVAL H&P NOTE
I discussed risks, benefits, and complications of surgery including but not limited to bleeding, infection, damage to nearby organs including but not limited to bladder, bowel, ureters, nerves, and blood vessels as well as anesthesia risks and uterine perforation.  Injury may result at the time of surgery or in a separate procedure.  We also discussed the possibility of a reoperation if the pathology came back abnormal.  All questions answered, and accepting these risks, the patient elects to proceed with the procedure.        Jailyn Palacios,   HcG=Negative

## 2021-08-19 NOTE — DISCHARGE INSTRUCTIONS
[facility]   Adult Discharge Orders & Instructions     For 24 hours after surgery    1. Get plenty of rest.  A responsible adult must stay with you for at least 24 hours after you leave the hospital.   2. Do not drive or use heavy equipment.  If you have weakness or tingling, don't drive or use heavy equipment until this feeling goes away.  3. Do not drink alcohol.  4. Avoid strenuous or risky activities.  Ask for help when climbing stairs.   5. You may feel lightheaded.  IF so, sit for a few minutes before standing.  Have someone help you get up.   6. If you have nausea (feel sick to your stomach): Drink only clear liquids such as apple juice, ginger ale, broth or 7-Up.  Rest may also help.  Be sure to drink enough fluids.  Move to a regular diet as you feel able.  7. You may have a slight fever. Call the doctor if your fever is over 100 F (37.7 C) (taken under the tongue) or lasts longer than 24 hours.  8. You may have a dry mouth, a sore throat, muscle aches or trouble sleeping.  These should go away after 24 hours.  9. Do not make important or legal decisions.     Call your doctor for any of the followin.  Signs of infection (fever, growing tenderness at the surgery site, a large amount of drainage or bleeding, severe pain, foul-smelling drainage, redness, swelling).    2. It has been over 8 to 10 hours since surgery and you are still not able to urinate (pass water).    3.  Headache for over 24 hours.    4.  Numbness, tingling or weakness the day after surgery (if you had spinal anesthesia).                  5. Signs of Covid-19 infection (temperature over 100 degrees, shortness of breath, cough, loss of taste/smell, generalized body aches, persistent headache,                  chills, sore throat, nausea/vomiting/diarrhea).

## 2021-08-19 NOTE — ANESTHESIA PREPROCEDURE EVALUATION
Anesthesia Pre-Procedure Evaluation    Patient: Mary Torres   MRN: [id number] : 1990        Preoperative Diagnosis: Menometrorrhagia [N92.1]   Procedure : Procedure(s):  Hysteroscopy dilation and curettage, endometrial ablation with Novasure     Past Medical History:   Diagnosis Date     Asthma, intermittent       Past Surgical History:   Procedure Laterality Date      SECTION  13    Failure to descend     ENT SURGERY      wisdom teeth      No Known Allergies   Social History     Tobacco Use     Smoking status: Never Smoker     Smokeless tobacco: Never Used   Substance Use Topics     Alcohol use: Yes     Comment: occ      Wt Readings from Last 1 Encounters:   21 63.5 kg (140 lb)        Anesthesia Evaluation   Pt has had prior anesthetic. Type: Regional.    No history of anesthetic complications       ROS/MED HX  ENT/Pulmonary:     (+) Intermittent, asthma Treatment: Inhaler prn,      Neurologic:  - neg neurologic ROS     Cardiovascular:  - neg cardiovascular ROS     METS/Exercise Tolerance:     Hematologic:  - neg hematologic  ROS     Musculoskeletal:  - neg musculoskeletal ROS     GI/Hepatic:  - neg GI/hepatic ROS     Renal/Genitourinary:  - neg Renal ROS     Endo:  - neg endo ROS     Psychiatric/Substance Use:  - neg psychiatric ROS     Infectious Disease:  - neg infectious disease ROS     Malignancy:  - neg malignancy ROS     Other:  - neg other ROS          Physical Exam    Airway  airway exam normal           Respiratory Devices and Support         Dental  no notable dental history         Cardiovascular   cardiovascular exam normal          Pulmonary   pulmonary exam normal                OUTSIDE LABS:  CBC:   Lab Results   Component Value Date    WBC 5.9 2021    WBC 7.7 2020    HGB 12.7 2021    HGB 12.4 2021    HCT 37.3 2021    HCT 35.4 2020     2021     2020     BMP:   Lab Results   Component Value Date      08/16/2021     03/23/2020    POTASSIUM 3.6 08/16/2021    POTASSIUM 3.9 03/23/2020    CHLORIDE 103 08/16/2021    CHLORIDE 105 03/23/2020    CO2 31 08/16/2021    CO2 28 03/23/2020    BUN 11 08/16/2021    BUN 8 03/23/2020    CR 0.67 08/16/2021    CR 0.48 (L) 03/23/2020    GLC 85 08/16/2021    GLC 83 03/23/2020     COAGS: No results found for: PTT, INR, FIBR  POC:   Lab Results   Component Value Date    HCG Negative 08/19/2021     HEPATIC:   Lab Results   Component Value Date    ALBUMIN 2.7 (L) 03/23/2020    PROTTOTAL 6.5 (L) 03/23/2020    ALT 19 03/23/2020    AST 18 03/23/2020    ALKPHOS 53 03/23/2020    BILITOTAL 0.2 03/23/2020     OTHER:   Lab Results   Component Value Date    ABRAM 9.1 08/16/2021    TSH 1.07 04/23/2021    SED 15 04/10/2012       Anesthesia Plan    ASA Status:  1   NPO Status:  NPO Appropriate    Anesthesia Type: MAC.     - Reason for MAC: straight local not clinically adequate   Induction: Intravenous, Propofol.   Maintenance: TIVA.        Consents    Anesthesia Plan(s) and associated risks, benefits, and realistic alternatives discussed. Questions answered and patient/representative(s) expressed understanding.     - Discussed with:  Patient      - Extended Intubation/Ventilatory Support Discussed: No.      - Patient is DNR/DNI Status: No    Use of blood products discussed: No .     Postoperative Care    Pain management: IV analgesics, Oral pain medications.   PONV prophylaxis: Ondansetron (or other 5HT-3), Background Propofol Infusion     Comments:                Yang Santana MD

## 2021-08-19 NOTE — ANESTHESIA CARE TRANSFER NOTE
Patient: Mary Torres    Procedure(s):  Hysteroscopy dilation and curettage, endometrial ablation with Novasure    Diagnosis: Menometrorrhagia [N92.1]  Diagnosis Additional Information: No value filed.    Anesthesia Type:   MAC     Note:    Oropharynx: oropharynx clear of all foreign objects and spontaneously breathing  Level of Consciousness: drowsy  Oxygen Supplementation: room air    Independent Airway: airway patency satisfactory and stable  Dentition: dentition unchanged  Vital Signs Stable: post-procedure vital signs reviewed and stable  Report to RN Given: handoff report given  Patient transferred to: Phase II    Handoff Report: Identifed the Patient, Identified the Reponsible Provider, Reviewed the pertinent medical history, Discussed the surgical course, Reviewed Intra-OP anesthesia mangement and issues during anesthesia, Set expectations for post-procedure period and Allowed opportunity for questions and acknowledgement of understanding      Vitals:  Vitals Value Taken Time   BP     Temp     Pulse     Resp     SpO2         Electronically Signed By: ISELA Lyn CRNA  August 19, 2021  9:34 AM

## 2021-08-19 NOTE — ANESTHESIA POSTPROCEDURE EVALUATION
Patient: Mary Torres    Procedure(s):  Hysteroscopy dilation and curettage, endometrial ablation with Novasure    Diagnosis:Menometrorrhagia [N92.1]  Diagnosis Additional Information: No value filed.    Anesthesia Type:  MAC    Note:  Disposition: Outpatient   Postop Pain Control: Uneventful            Sign Out: Well controlled pain   PONV: No   Neuro/Psych: Uneventful            Sign Out: Acceptable/Baseline neuro status   Airway/Respiratory: Uneventful            Sign Out: Acceptable/Baseline resp. status   CV/Hemodynamics: Uneventful            Sign Out: Acceptable CV status; No obvious hypovolemia; No obvious fluid overload   Other NRE: NONE   DID A NON-ROUTINE EVENT OCCUR? No           Last vitals:  Vitals Value Taken Time   /57 08/19/21 1005   Temp 97.2  F (36.2  C) 08/19/21 0934   Pulse 62 08/19/21 0948   Resp 16 08/19/21 1005   SpO2 100 % 08/19/21 1005       Electronically Signed By: Yang Santana MD  August 19, 2021  2:29 PM

## 2021-08-21 ENCOUNTER — HOSPITAL ENCOUNTER (EMERGENCY)
Facility: CLINIC | Age: 31
Discharge: HOME | End: 2021-08-21
Attending: EMERGENCY MEDICINE | Admitting: EMERGENCY MEDICINE
Payer: COMMERCIAL

## 2021-08-21 ENCOUNTER — APPOINTMENT (OUTPATIENT)
Dept: GENERAL RADIOLOGY | Facility: CLINIC | Age: 31
End: 2021-08-21
Attending: EMERGENCY MEDICINE
Payer: COMMERCIAL

## 2021-08-21 VITALS
OXYGEN SATURATION: 100 % | SYSTOLIC BLOOD PRESSURE: 122 MMHG | HEART RATE: 72 BPM | RESPIRATION RATE: 16 BRPM | BODY MASS INDEX: 23.16 KG/M2 | WEIGHT: 140 LBS | TEMPERATURE: 98.3 F | DIASTOLIC BLOOD PRESSURE: 76 MMHG

## 2021-08-21 DIAGNOSIS — S61.239A PUNCTURE WOUND OF FINGER OF RIGHT HAND, INITIAL ENCOUNTER: ICD-10-CM

## 2021-08-21 PROCEDURE — 99284 EMERGENCY DEPT VISIT MOD MDM: CPT | Performed by: EMERGENCY MEDICINE

## 2021-08-21 PROCEDURE — 99283 EMERGENCY DEPT VISIT LOW MDM: CPT | Performed by: EMERGENCY MEDICINE

## 2021-08-21 PROCEDURE — 73140 X-RAY EXAM OF FINGER(S): CPT | Mod: RT

## 2021-08-21 PROCEDURE — 250N000013 HC RX MED GY IP 250 OP 250 PS 637: Performed by: EMERGENCY MEDICINE

## 2021-08-21 RX ADMIN — AMOXICILLIN AND CLAVULANATE POTASSIUM 1 TABLET: 875; 125 TABLET, FILM COATED ORAL at 20:26

## 2021-08-22 NOTE — ED PROVIDER NOTES
History     Chief Complaint   Patient presents with     Hand Injury     HPI  Mary Torres is a 31 year old female who presents to the emergency room for puncture wound.  She states that this morning a rooster spurred her on her right middle finger.  Sustained a puncture wound.  Initially cleaned and bandaged it but has noticed throughout the day she has had more swelling and pain, leading to difficulty with bending her finger.  Has been spurred before by rooster in her leg and did not have any complications.  She has not been running a fever.  Is right-hand dominant.    Allergies:  No Known Allergies    Problem List:    Patient Active Problem List    Diagnosis Date Noted     S/P endometrial ablation 2021     Priority: Medium     Menorrhagia with regular cycle 2021     Priority: Medium     S/P  section 2020     Priority: Medium     LTBI (latent tuberculosis infection) 10/18/2012     Priority: Medium     Intermittent asthma 10/09/2012     Priority: Medium     CARDIOVASCULAR SCREENING; LDL GOAL LESS THAN 160 04/10/2012     Priority: Medium        Past Medical History:    Past Medical History:   Diagnosis Date     Asthma, intermittent        Past Surgical History:    Past Surgical History:   Procedure Laterality Date      SECTION  2013    Failure to descend     ENDOMETRIAL ABLATION W/ NOVASURE N/A 2021    HD&C, EA with Novasure, Menometrorrhagia     ENT SURGERY      wisdom teeth       Family History:    Family History   Problem Relation Age of Onset     Asthma Mother      Diabetes Father      Thyroid Disease Father      Cancer Paternal Grandmother         lung cancer     Asthma Maternal Grandmother      Asthma Sister      C.A.D. No family hx of      Hypertension No family hx of      Cerebrovascular Disease No family hx of      Breast Cancer No family hx of      Cancer - colorectal No family hx of      Prostate Cancer No family hx of      Alcohol/Drug No family hx of       Allergies No family hx of      Alzheimer Disease No family hx of      Anesthesia Reaction No family hx of      Arthritis No family hx of      Blood Disease No family hx of      Cardiovascular No family hx of      Circulatory No family hx of      Congenital Anomalies No family hx of      Connective Tissue Disorder No family hx of      Depression No family hx of      Endocrine Disease No family hx of      Eye Disorder No family hx of      Genetic Disorder No family hx of      Gastrointestinal Disease No family hx of      Genitourinary Problems No family hx of      Gynecology No family hx of      Heart Disease No family hx of      Lipids No family hx of      Musculoskeletal Disorder No family hx of      Neurologic Disorder No family hx of      Obesity No family hx of      Osteoporosis No family hx of      Psychotic Disorder No family hx of      Respiratory No family hx of      Family History Negative No family hx of      Hearing Loss No family hx of      Skin Cancer No family hx of        Social History:  Marital Status:   [2]  Social History     Tobacco Use     Smoking status: Never Smoker     Smokeless tobacco: Never Used   Vaping Use     Vaping Use: Never used   Substance Use Topics     Alcohol use: Yes     Comment: occ     Drug use: No        Medications:    amoxicillin-clavulanate (AUGMENTIN) 875-125 MG tablet  albuterol (PROAIR HFA/PROVENTIL HFA/VENTOLIN HFA) 108 (90 Base) MCG/ACT inhaler  fluticasone (FLOVENT HFA) 110 MCG/ACT inhaler  HYDROcodone-acetaminophen (NORCO) 5-325 MG tablet  ibuprofen (ADVIL/MOTRIN) 800 MG tablet          Review of Systems   All other systems reviewed and are negative.      Physical Exam   BP: 122/76  Pulse: 74  Temp: 98.3  F (36.8  C)  Resp: 16  Weight: 63.5 kg (140 lb)  SpO2: 100 %      Physical Exam  Vitals and nursing note reviewed.   Constitutional:       General: She is not in acute distress.     Appearance: She is not diaphoretic.   HENT:      Head: Atraumatic.       Mouth/Throat:      Pharynx: No oropharyngeal exudate.   Eyes:      General: No scleral icterus.     Pupils: Pupils are equal, round, and reactive to light.   Cardiovascular:      Pulses: Normal pulses.      Heart sounds: Normal heart sounds.   Pulmonary:      Effort: No respiratory distress.      Breath sounds: Normal breath sounds.   Abdominal:      General: Bowel sounds are normal.      Palpations: Abdomen is soft.      Tenderness: There is no abdominal tenderness.   Musculoskeletal:         General: Swelling and tenderness present.      Right hand: Swelling present. Decreased range of motion.   Skin:     General: Skin is warm.      Capillary Refill: Capillary refill takes 2 to 3 seconds.      Findings: No rash.   Neurological:      Mental Status: She is alert.             ED Course        Procedures              Critical Care time:  none               Results for orders placed or performed during the hospital encounter of 08/21/21 (from the past 24 hour(s))   XR Finger Right G/E 2 Views    Narrative    EXAM: XR FINGER RT G/E 2 VW  LOCATION: [facility]  DATE/TIME: 8/21/2021 7:30 PM    INDICATION: Right finger puncture wound and pain.  COMPARISON: None.      Impression    IMPRESSION: Normal joint spaces and alignment. No fracture. No radiodense foreign body.         Medications   amoxicillin-clavulanate (AUGMENTIN) 875-125 MG per tablet 1 tablet (1 tablet Oral Given 8/21/21 2026)       Assessments & Plan (with Medical Decision Making)  Mary is a 31-year-old female who was spurred by rooster in right third digit earlier this morning.  Is having swelling and pain leading to restricted range of motion.  See history and focused physical exam as above  Puncture wound noted, no signs of bleeding or embedded spur or foreign body.  Does have decreased range of motion with flexion, normal cap refill and strong radial pulse.  No other injuries noted.  X-ray was obtained to evaluate for  embedded spur or other foreign body.  Results as above.  Patient was updated on the x-ray results.  Biotic ointment and bandage were applied.  She was given a tablet of amoxicillin to start here and a prescription for the duration of therapy was sent to her pharmacy to  tomorrow.  Advised her to follow-up closely with her primary provider and to monitor for signs of infectious tenosynovitis or other ascending infection due to this puncture wound.  Return at any time to the emergency room if symptoms worsen.  Discharged in no acute distress     I have reviewed the nursing notes.    I have reviewed the findings, diagnosis, plan and need for follow up with the patient.       Discharge Medication List as of 8/21/2021  8:20 PM      START taking these medications    Details   amoxicillin-clavulanate (AUGMENTIN) 875-125 MG tablet Take 1 tablet by mouth 2 times daily, Disp-28 tablet, R-0, E-Prescribe             Final diagnoses:   Puncture wound of finger of right hand, initial encounter       8/21/2021   [facility] EMERGENCY DEPT     Elisabeth Georges DO  08/21/21 1014

## 2021-08-22 NOTE — DISCHARGE INSTRUCTIONS
There was no evidence of foreign body retained in the soft tissue    X-ray otherwise look normal    Start taking the antibiotic as prescribed.  You need to take it twice a day for the next 2 weeks    You may apply antibiotic ointment and bandage once daily or if it becomes dirty or soiled    Follow closely with your primary provider early next week for wound recheck.  Want to make sure that there is no worsening infection developing    If you develop a fever, have streaking redness of your hand or arm, have worsening pain, or any new or concerning symptoms, do not hesitate to return immediately to the emergency room for evaluation

## 2021-08-22 NOTE — ED NOTES
First dose of antibiotics given in the ED tonight as pt's pharmacy is closed.  Reviewed instructions with pt, no additional questions or concerns.  Provider applied bandaid and ointment to the wound.

## 2021-08-22 NOTE — ED TRIAGE NOTES
Patient was spurred by a rooster this morning in the right middle finger. The wound is not big but she is having difficulty bending her finger now.

## 2021-08-24 ENCOUNTER — OFFICE VISIT (OUTPATIENT)
Dept: FAMILY MEDICINE | Facility: CLINIC | Age: 31
End: 2021-08-24
Payer: COMMERCIAL

## 2021-08-24 VITALS
DIASTOLIC BLOOD PRESSURE: 58 MMHG | OXYGEN SATURATION: 98 % | BODY MASS INDEX: 23.99 KG/M2 | TEMPERATURE: 98.6 F | HEART RATE: 89 BPM | WEIGHT: 144 LBS | HEIGHT: 65 IN | RESPIRATION RATE: 18 BRPM | SYSTOLIC BLOOD PRESSURE: 92 MMHG

## 2021-08-24 DIAGNOSIS — S61.239D: ICD-10-CM

## 2021-08-24 DIAGNOSIS — Z09 HOSPITAL DISCHARGE FOLLOW-UP: Primary | ICD-10-CM

## 2021-08-24 PROCEDURE — 99213 OFFICE O/P EST LOW 20 MIN: CPT | Performed by: NURSE PRACTITIONER

## 2021-08-24 ASSESSMENT — MIFFLIN-ST. JEOR: SCORE: 1377.23

## 2021-08-24 NOTE — PROGRESS NOTES
Assessment & Plan     Hospital discharge follow-up      Puncture wound of finger of right hand, subsequent encounter      Recommend continuation of Augmentin twice daily as she has been finishing this course of antibiotic.  Keep area clean dry avoid overuse.  If symptoms of increased erythema pain warmth or drainage occur will return to clinic for further evaluation.        ISELA Ambrose CNP  [facility] SARAH Torres is a 31 year old female who presents to clinic today for the following health issues accompanied by her :    HPI     ED/UC Followup:    Facility:  [facility]   Date of visit: 8/21/2021  Reason for visit: Puncture wound of finger of right hand  Current Status: 2/10 on pain scale. Currently taking Augmentin.    Patient presents to clinic for follow-up puncture wound of right hand fourth finger review of ER visit note with photo patient states area is much improved she is currently taking Augmentin twice daily denies side effects to this treatment.  She is able to make a fist CMS is intact area is dry mildly tender there is no erythema and there is mild swelling present.      Review of Systems   Constitutional, HEENT, cardiovascular, pulmonary, gi and gu systems are negative, except as otherwise noted.      Objective    LMP 08/09/2021 (LMP Unknown)   There is no height or weight on file to calculate BMI.  Physical Exam   GENERAL: healthy, alert and no distress  NECK: no adenopathy, no asymmetry, masses, or scars and thyroid normal to palpation  RESP: lungs clear to auscultation - no rales, rhonchi or wheezes  CV: regular rate and rhythm, normal S1 S2, no S3 or S4, no murmur, click or rub, no peripheral edema and peripheral pulses strong  MS: no gross musculoskeletal defects noted, no edema  SKIN: Healing puncture wound right hand fourth finger negative for redness or warmth mild tenderness is present area is dry

## 2021-09-03 ENCOUNTER — OFFICE VISIT (OUTPATIENT)
Dept: OBGYN | Facility: OTHER | Age: 31
End: 2021-09-03
Payer: COMMERCIAL

## 2021-09-03 VITALS — WEIGHT: 144 LBS | SYSTOLIC BLOOD PRESSURE: 104 MMHG | BODY MASS INDEX: 23.82 KG/M2 | DIASTOLIC BLOOD PRESSURE: 60 MMHG

## 2021-09-03 DIAGNOSIS — Z98.890 S/P ENDOMETRIAL ABLATION: Primary | ICD-10-CM

## 2021-09-03 PROCEDURE — 99213 OFFICE O/P EST LOW 20 MIN: CPT | Performed by: OBSTETRICS & GYNECOLOGY

## 2021-09-03 NOTE — PROGRESS NOTES
Subjective  31 year old non-pregnant female presents today as a post-op from a HD&C, endometrial ablation on 2021 for menometrorrhagia.  No pain.  No vaginal bleeding.  No problems urinating.  Normal bowel movements.  No fever or chills.  No back pain.  Nothing per vagina.  We reviewed her pathology from surgery and all questions were answered.      ROS: 10 point ROS neg other than the symptoms noted above in the HPI.  Past Medical History:   Diagnosis Date     Asthma, intermittent      Past Surgical History:   Procedure Laterality Date      SECTION  2013    Failure to descend     ENDOMETRIAL ABLATION W/ NOVASURE N/A 2021    HD&C, EA with Novasure, Menometrorrhagia     ENT SURGERY      wisdom teeth     Family History   Problem Relation Age of Onset     Asthma Mother      Diabetes Father      Thyroid Disease Father      Cancer Paternal Grandmother         lung cancer     Asthma Maternal Grandmother      Asthma Sister      C.A.D. No family hx of      Hypertension No family hx of      Cerebrovascular Disease No family hx of      Breast Cancer No family hx of      Cancer - colorectal No family hx of      Prostate Cancer No family hx of      Alcohol/Drug No family hx of      Allergies No family hx of      Alzheimer Disease No family hx of      Anesthesia Reaction No family hx of      Arthritis No family hx of      Blood Disease No family hx of      Cardiovascular No family hx of      Circulatory No family hx of      Congenital Anomalies No family hx of      Connective Tissue Disorder No family hx of      Depression No family hx of      Endocrine Disease No family hx of      Eye Disorder No family hx of      Genetic Disorder No family hx of      Gastrointestinal Disease No family hx of      Genitourinary Problems No family hx of      Gynecology No family hx of      Heart Disease No family hx of      Lipids No family hx of      Musculoskeletal Disorder No family hx of      Neurologic Disorder No  family hx of      Obesity No family hx of      Osteoporosis No family hx of      Psychotic Disorder No family hx of      Respiratory No family hx of      Family History Negative No family hx of      Hearing Loss No family hx of      Skin Cancer No family hx of      Social History     Tobacco Use     Smoking status: Never Smoker     Smokeless tobacco: Never Used   Substance Use Topics     Alcohol use: Yes     Comment: occ         Objective  Vitals: /60 (BP Location: Left arm, Cuff Size: Adult Regular)   Wt 65.3 kg (144 lb)   LMP 08/09/2021 (Exact Date)   BMI 23.82 kg/m    BMI= Body mass index is 23.82 kg/m .        Pathology=8/19/2021:  Specimen:    Endometrium, Endometrial Currettings                                                       Final Diagnosis   Endometrium, curettage:  - Proliferative endometrium  - Negative for hyperplasia, atypia or malignancy       Assessment  1.)  S/p HD&C, endometrial ablation on 8/19/2021 for menometrorrhagia=benign pathology      Plan  1.)  Follow-up as needed       15 minutes were spent on the date of the encounter doing chart review, history and exam, documentation, and further activities as noted above.    Jailyn Palacios DO

## 2021-09-03 NOTE — PATIENT INSTRUCTIONS
Please call if you any questions.    [address]   [address]  [phone]        Jailyn Palacios,

## 2021-09-26 ENCOUNTER — HEALTH MAINTENANCE LETTER (OUTPATIENT)
Age: 31
End: 2021-09-26

## 2022-01-01 NOTE — OP NOTE
OPERATIVE REPORT:    Date of Procedure:  8/19/2021    Preoperative Diagnosis:  Menometrorrhagia    Postoperative Diagnosis:  Same    Procedures:  Hysteroscopy, D&C, endometrial ablation with Novasure    Surgeon:  Jailyn Palacios    Assist:  OR staff    Anesthesia:  MAC    Specimens:    Endometrial curettings    Complications:   None    Findings/Conclusions:   Non-proliferative endometrium, bilateral ostia visualized, uterus sounded to 8cm, cervix measured 4cm    Estimated Blood Loss:  Minimal    Condition on discharge from OR:  Satisfactory      Procedure in Detail:  Proper consents were obtained.  The patient and I reviewed the risks, benefits, goals and limitations.  Her questions seemed to be answered.  After consenting to the procedure, the patient was taken to the OR where she was placed into the supine position.  She was then placed under MAC anesthesia after which she was placed into the dorsal lithotomy position.  She was then prepped and draped into the usual sterile fashion.  I then performed the exam under anesthesia.   The bladder was drained with the in and out catheterization.  The speculum was placed and the cervix was grasped with a single tooth tenaculum. The uterus was sounded to 8 cm.  The hysteroscope was inserted in through the cervix with the findings as noted above.  The hysteroscope was removed.  Sharp curettage of the uterus was performed in a circumferential manor until a gritty texture was noted.  The curettage started at the 12 o'clock position and each pass was brought out, from each of the hours of the clock.  The cornua and the fundus were curetted.  The endometrial curettings were sent to pathology. The Novasure device was primmed according to protocol and gently introduced into the uterine fundus.  A length of 4cm was set and a width of 4.3 was given in return.  The device ran for 88 seconds at a power of 95.  After this, a cool down process ensued.  The Novasure device was  removed and good charring was noted.  The single tooth tenaculum was removed and hemostasis was noted after application of Silver Nitrate.  Everything was removed from the patient's vagina.  The patient was then taken out of the dorsal lithotomy position, awakened, and taken to the recovery room in stable condition.    No apparent complications.  Counts were correct.  I called and spoke to the patient's  after the procedure and all questions were answered.    Jailyn Palacios           40

## 2022-01-07 NOTE — PROGRESS NOTES
[address]  [facility] [address]  Phone: [phone]  Primary Provider: Jailyn Palacios  Pre-op Performing Provider: MICHAEL LANGE      PREOPERATIVE EVALUATION:  Today's date: 1/10/2022    Mary Torres is a 31 year old female who presents for a preoperative evaluation.    Surgical Information:  Surgery/Procedure: ABDOMINAL PLASTY BREAST AUGMENTATION  [facility]gery [facility]: [facility]  Surgeon:    Surgery Date: 22  Time of Surgery: Dr. Shell  Where patient plans to recover: At home with family  Fax number for surgical facility: [phone]    Type of Anesthesia Anticipated: General    Assessment & Plan     The proposed surgical procedure is considered INTERMEDIATE risk.      ICD-10-CM    1. Preop general physical exam  Z01.818    2. Need for hepatitis C screening test  Z11.59    3. Mild intermittent asthma without complication  J45.20    4. LTBI (latent tuberculosis infection)  Z22.7    5. S/P endometrial ablation  Z98.890    6. S/P  section  Z98.891        Treated INH for 6 months for latent TB years ago.  Per patient, no COVID testing required       Risks and Recommendations:  The patient has the following additional risks and recommendations for perioperative complications:   - No identified additional risk factors other than previously addressed        RECOMMENDATION:  APPROVAL GIVEN to proceed with proposed procedure, without further diagnostic evaluation.      10 minutes spent on the date of the encounter doing chart review, history and exam, documentation and further activities per the note        Subjective     HPI related to upcoming procedure: weight loss and change in body      Preop Questions 2022   1. Have you ever had a heart attack or stroke? No   2. Have you ever had surgery on your heart or blood vessels, such as a stent placement, a coronary artery bypass, or surgery on an artery in your head, neck,  heart, or legs? No   3. Do you have chest pain with activity? No   4. Do you have a history of  heart failure? No   5. Do you currently have a cold, bronchitis or symptoms of other infection? No   6. Do you have a cough, shortness of breath, or wheezing? No   7. Do you or anyone in your family have previous history of blood clots? No   8. Do you or does anyone in your family have a serious bleeding problem such as prolonged bleeding following surgeries or cuts? No   9. Have you ever had problems with anemia or been told to take iron pills? YES - after second c/s, normalized after last hg was Aug   10. Have you had any abnormal blood loss such as black, tarry or bloody stools, or abnormal vaginal bleeding? No   11. Have you ever had a blood transfusion? No   12. Are you willing to have a blood transfusion if it is medically needed before, during, or after your surgery? Yes   13. Have you or any of your relatives ever had problems with anesthesia? No   14. Do you have sleep apnea, excessive snoring or daytime drowsiness? No   15. Do you have any artifical heart valves or other implanted medical devices like a pacemaker, defibrillator, or continuous glucose monitor? No   16. Do you have artificial joints? No   17. Are you allergic to latex? No   18. Is there any chance that you may be pregnant? No     Health Care Directive:  Patient does not have a Health Care Directive or Living Will: Discussed advance care planning with patient; information given to patient to review.    Preoperative Review of :   reviewed - had vicodin in Aug - 5 tabs      Status of Chronic Conditions:  ASTHMA - Patient has a longstanding history of moderate-severe Asthma . Patient has been doing well overall noting NO SYMPTOMS and continues on medication regimen consisting of as needed flovent or proair without adverse reactions or side effects.       Review of Systems  Constitutional, neuro, ENT, endocrine, pulmonary, cardiac,  gastrointestinal, genitourinary, musculoskeletal, integument and psychiatric systems are negative, except as otherwise noted.    Patient Active Problem List    Diagnosis Date Noted     S/P endometrial ablation 2021     Priority: Medium     Menorrhagia with regular cycle 2021     Priority: Medium     S/P  section 2020     Priority: Medium     LTBI (latent tuberculosis infection) 10/18/2012     Priority: Medium     Intermittent asthma 10/09/2012     Priority: Medium     CARDIOVASCULAR SCREENING; LDL GOAL LESS THAN 160 04/10/2012     Priority: Medium      Past Medical History:   Diagnosis Date     Asthma, intermittent      Past Surgical History:   Procedure Laterality Date      SECTION  2013    Failure to descend     DILATION AND CURETTAGE, HYSTEROSCOPY, ABLATE ENDOMETRIUM NOVASURE, COMBINED N/A 2021    Procedure: Hysteroscopy dilation and curettage, endometrial ablation with Novasure;  Surgeon: Jailyn Palacios DO;  Location: [facility]     ENDOMETRIAL ABLATION W/ NOVASURE N/A 2021    HD&C, EA with Novasure, Menometrorrhagia     ENT SURGERY      wisdom teeth     Current Outpatient Medications   Medication Sig Dispense Refill     albuterol (PROAIR HFA/PROVENTIL HFA/VENTOLIN HFA) 108 (90 Base) MCG/ACT inhaler Inhale 2 puffs into the lungs every 6 hours as needed for shortness of breath / dyspnea 1 Inhaler 3     fluticasone (FLOVENT HFA) 110 MCG/ACT inhaler Inhale 2 puffs into the lungs 2 times daily 1 Inhaler 1     amoxicillin-clavulanate (AUGMENTIN) 875-125 MG tablet Take 1 tablet by mouth 2 times daily 28 tablet 0     ibuprofen (ADVIL/MOTRIN) 800 MG tablet Take 1 tablet (800 mg) by mouth every 6 hours as needed for other (mild and/or inflammatory pain) 30 tablet 0       No Known Allergies     Social History     Tobacco Use     Smoking status: Never Smoker     Smokeless tobacco: Never Used   Substance Use Topics     Alcohol use: Yes     Comment: occ       History  "  Drug Use No         Objective     /62   Pulse 74   Temp 97.8  F (36.6  C) (Temporal)   Resp 16   Ht 1.61 m (5' 3.39\")   Wt 65.5 kg (144 lb 8 oz)   SpO2 100%   Breastfeeding No   BMI 25.29 kg/m      Physical Exam    GENERAL APPEARANCE: healthy, alert and no distress     EYES: EOMI, PERRL     HENT: ear canals and TM's normal and nose and mouth without ulcers or lesions     NECK: no adenopathy, no asymmetry, masses, or scars and thyroid normal to palpation     RESP: lungs clear to auscultation - no rales, rhonchi or wheezes     CV: regular rates and rhythm, normal S1 S2, no S3 or S4 and no murmur, click or rub     ABDOMEN:  soft, nontender, no HSM or masses and bowel sounds normal     MS: extremities normal- no gross deformities noted, no evidence of inflammation in joints, FROM in all extremities.     SKIN: no suspicious lesions or rashes     NEURO: Normal strength and tone, sensory exam grossly normal, mentation intact and speech normal     PSYCH: mentation appears normal. and affect normal/bright     LYMPHATICS: No cervical adenopathy    Recent Labs   Lab Test 08/16/21  1008 04/23/21  1052 03/23/20  0836   HGB 12.7 12.4 12.0     --  209     --  138   POTASSIUM 3.6  --  3.9   CR 0.67  --  0.48*        Diagnostics:  No labs were ordered during this visit.   No EKG required, no history of coronary heart disease, significant arrhythmia, peripheral arterial disease or other structural heart disease.    Revised Cardiac Risk Index (RCRI):  The patient has the following serious cardiovascular risks for perioperative complications:   - No serious cardiac risks = 0 points     RCRI Interpretation: 0 points: Class I (very low risk - 0.4% complication rate)           Signed Electronically by: Jamila López MD, MD  Copy of this evaluation report is provided to requesting physician.      "

## 2022-01-10 ENCOUNTER — OFFICE VISIT (OUTPATIENT)
Dept: FAMILY MEDICINE | Facility: OTHER | Age: 32
End: 2022-01-10
Payer: COMMERCIAL

## 2022-01-10 ENCOUNTER — TELEPHONE (OUTPATIENT)
Dept: FAMILY MEDICINE | Facility: OTHER | Age: 32
End: 2022-01-10

## 2022-01-10 VITALS
TEMPERATURE: 97.8 F | RESPIRATION RATE: 16 BRPM | SYSTOLIC BLOOD PRESSURE: 108 MMHG | HEIGHT: 63 IN | DIASTOLIC BLOOD PRESSURE: 62 MMHG | HEART RATE: 74 BPM | OXYGEN SATURATION: 100 % | BODY MASS INDEX: 25.6 KG/M2 | WEIGHT: 144.5 LBS

## 2022-01-10 DIAGNOSIS — Z11.59 NEED FOR HEPATITIS C SCREENING TEST: ICD-10-CM

## 2022-01-10 DIAGNOSIS — Z98.891 S/P CESAREAN SECTION: ICD-10-CM

## 2022-01-10 DIAGNOSIS — J45.20 MILD INTERMITTENT ASTHMA WITHOUT COMPLICATION: ICD-10-CM

## 2022-01-10 DIAGNOSIS — Z01.818 PREOP GENERAL PHYSICAL EXAM: Primary | ICD-10-CM

## 2022-01-10 DIAGNOSIS — Z22.7 LTBI (LATENT TUBERCULOSIS INFECTION): ICD-10-CM

## 2022-01-10 DIAGNOSIS — Z98.890 S/P ENDOMETRIAL ABLATION: ICD-10-CM

## 2022-01-10 PROCEDURE — 99213 OFFICE O/P EST LOW 20 MIN: CPT | Performed by: FAMILY MEDICINE

## 2022-01-10 ASSESSMENT — ASTHMA QUESTIONNAIRES
QUESTION_2 LAST FOUR WEEKS HOW OFTEN HAVE YOU HAD SHORTNESS OF BREATH: NOT AT ALL
QUESTION_3 LAST FOUR WEEKS HOW OFTEN DID YOUR ASTHMA SYMPTOMS (WHEEZING, COUGHING, SHORTNESS OF BREATH, CHEST TIGHTNESS OR PAIN) WAKE YOU UP AT NIGHT OR EARLIER THAN USUAL IN THE MORNING: NOT AT ALL
QUESTION_5 LAST FOUR WEEKS HOW WOULD YOU RATE YOUR ASTHMA CONTROL: COMPLETELY CONTROLLED
ACT_TOTALSCORE: 25
QUESTION_4 LAST FOUR WEEKS HOW OFTEN HAVE YOU USED YOUR RESCUE INHALER OR NEBULIZER MEDICATION (SUCH AS ALBUTEROL): NOT AT ALL
QUESTION_1 LAST FOUR WEEKS HOW MUCH OF THE TIME DID YOUR ASTHMA KEEP YOU FROM GETTING AS MUCH DONE AT WORK, SCHOOL OR AT HOME: NONE OF THE TIME

## 2022-01-10 ASSESSMENT — PAIN SCALES - GENERAL: PAINLEVEL: NO PAIN (0)

## 2022-01-10 ASSESSMENT — MIFFLIN-ST. JEOR: SCORE: 1350.7

## 2022-01-11 ASSESSMENT — ASTHMA QUESTIONNAIRES: ACT_TOTALSCORE: 25

## 2022-01-21 ENCOUNTER — TELEPHONE (OUTPATIENT)
Dept: FAMILY MEDICINE | Facility: OTHER | Age: 32
End: 2022-01-21
Payer: COMMERCIAL

## 2022-01-21 NOTE — TELEPHONE ENCOUNTER
Patient calling to state she had preop with Dr López the other day and she thought she did not need to have covid swab but the surgeons office has called to let her know she will need the covid swab.  Can orders be placed for this so patient can completed today or tomorrow?

## 2022-01-21 NOTE — TELEPHONE ENCOUNTER
COVID orders are done typically through the surgeon and his office. I have done this when unable, but the typical plan is through the surgeon's office as then he gets the results and there is less likely a delay in surgery while getting the report to them.  Jamila López MD

## 2022-05-12 ENCOUNTER — E-VISIT (OUTPATIENT)
Dept: URGENT CARE | Facility: CLINIC | Age: 32
End: 2022-05-12
Payer: COMMERCIAL

## 2022-05-12 DIAGNOSIS — N39.0 ACUTE UTI (URINARY TRACT INFECTION): Primary | ICD-10-CM

## 2022-05-12 PROCEDURE — 99421 OL DIG E/M SVC 5-10 MIN: CPT | Performed by: NURSE PRACTITIONER

## 2022-05-12 RX ORDER — NITROFURANTOIN 25; 75 MG/1; MG/1
100 CAPSULE ORAL 2 TIMES DAILY
Qty: 10 CAPSULE | Refills: 0 | Status: SHIPPED | OUTPATIENT
Start: 2022-05-12 | End: 2022-05-17

## 2022-05-13 NOTE — PATIENT INSTRUCTIONS
Dear Mary Wilkinsonky    After reviewing your responses, I've been able to diagnose you with a urinary tract infection, which is a common infection of the bladder with bacteria.  This is not a sexually transmitted infection, though urinating immediately after intercourse can help prevent infections.  Drinking lots of fluids is also helpful to clear your current infection and prevent the next one.      I have sent a prescription for antibiotics to your pharmacy to treat this infection.    It is important that you take all of your prescribed medication even if your symptoms are improving after a few doses.  Taking all of your medicine helps prevent the symptoms from returning.     If your symptoms worsen, you develop pain in your back or stomach, develop fevers, or are not improving in 5 days, please contact your primary care provider for an appointment or visit any of our convenient Walk-in or Urgent Care Centers to be seen, which can be found on our website here.    Thanks again for choosing us as your health care partner,    Gela Hampton CNP    Urinary Tract Infections in Women  Urinary tract infections (UTIs) are most often caused by bacteria. These bacteria enter the urinary tract. The bacteria may come from inside the body. Or they may travel from the skin outside the rectum or vagina into the urethra. Female anatomy makes it easy for bacteria from the bowel to enter a woman s urinary tract, which is the most common source of UTI. This means women develop UTIs more often than men. Pain in or around the urinary tract is a common UTI symptom. But the only way to know for sure if you have a UTI for the healthcare provider to test your urine. The two tests that may be done are the urinalysis and urine culture.     Types of UTIs    Cystitis. A bladder infection (cystitis) is the most common UTI in women. You may have urgent or frequent need to pee. You may also have pain, burning when you pee, and bloody  urine.    Urethritis. This is an inflamed urethra, which is the tube that carries urine from the bladder to outside the body. You may have lower stomach or back pain. You may also have urgent or frequent need to pee.    Pyelonephritis. This is a kidney infection. If not treated, it can be serious and damage your kidneys. In severe cases, you may need to stay in the hospital. You may have a fever and lower back pain.    Medicines to treat a UTI  Most UTIs are treated with antibiotics. These kill the bacteria. The length of time you need to take them depends on the type of infection. It may be as short as 3 days. If you have repeated UTIs, you may need a low-dose antibiotic for several months. Take antibiotics exactly as directed. Don t stop taking them until all of the medicine is gone. If you stop taking the antibiotic too soon, the infection may not go away. You may also develop a resistance to the antibiotic. This can make it much harder to treat.   Lifestyle changes to treat and prevent UTIs   The lifestyle changes below will help get rid of your UTI. They may also help prevent future UTIs.     Drink plenty of fluids. This includes water, juice, or other caffeine-free drinks. Fluids help flush bacteria out of your body.    Empty your bladder. Always empty your bladder when you feel the urge to pee. And always pee before going to sleep. Urine that stays in your bladder can lead to infection. Try to pee before and after sex as well.    Practice good personal hygiene. Wipe yourself from front to back after using the toilet. This helps keep bacteria from getting into the urethra.    Use condoms during sex. These help prevent UTIs caused by sexually transmitted bacteria. Also don't use spermicides during sex. These can increase the risk for UTIs. Choose other forms of birth control instead. For women who tend to get UTIs after sex, a low-dose of a preventive antibiotic may be used. Be sure to discuss this option with  your healthcare provider.    Follow up with your healthcare provider as directed. He or she may test to make sure the infection has cleared. If needed, more treatment may be started.  Michele last reviewed this educational content on 7/1/2019 2000-2021 [employer]. All rights reserved. This information is not intended as a substitute for professional medical care. Always follow your healthcare professional's instructions.

## 2022-11-06 ASSESSMENT — ASTHMA QUESTIONNAIRES
QUESTION_1 LAST FOUR WEEKS HOW MUCH OF THE TIME DID YOUR ASTHMA KEEP YOU FROM GETTING AS MUCH DONE AT WORK, SCHOOL OR AT HOME: NONE OF THE TIME
QUESTION_4 LAST FOUR WEEKS HOW OFTEN HAVE YOU USED YOUR RESCUE INHALER OR NEBULIZER MEDICATION (SUCH AS ALBUTEROL): NOT AT ALL
QUESTION_3 LAST FOUR WEEKS HOW OFTEN DID YOUR ASTHMA SYMPTOMS (WHEEZING, COUGHING, SHORTNESS OF BREATH, CHEST TIGHTNESS OR PAIN) WAKE YOU UP AT NIGHT OR EARLIER THAN USUAL IN THE MORNING: NOT AT ALL
ACT_TOTALSCORE: 25
QUESTION_5 LAST FOUR WEEKS HOW WOULD YOU RATE YOUR ASTHMA CONTROL: COMPLETELY CONTROLLED
QUESTION_2 LAST FOUR WEEKS HOW OFTEN HAVE YOU HAD SHORTNESS OF BREATH: NOT AT ALL
ACT_TOTALSCORE: 25

## 2022-11-10 ENCOUNTER — OFFICE VISIT (OUTPATIENT)
Dept: FAMILY MEDICINE | Facility: CLINIC | Age: 32
End: 2022-11-10
Payer: COMMERCIAL

## 2022-11-10 VITALS
WEIGHT: 138.6 LBS | HEIGHT: 63 IN | DIASTOLIC BLOOD PRESSURE: 69 MMHG | RESPIRATION RATE: 8 BRPM | OXYGEN SATURATION: 100 % | HEART RATE: 74 BPM | TEMPERATURE: 98.4 F | SYSTOLIC BLOOD PRESSURE: 112 MMHG | BODY MASS INDEX: 24.56 KG/M2

## 2022-11-10 DIAGNOSIS — G44.209 TENSION-TYPE HEADACHE, NOT INTRACTABLE, UNSPECIFIED CHRONICITY PATTERN: ICD-10-CM

## 2022-11-10 DIAGNOSIS — G43.109 MIGRAINE WITH AURA AND WITHOUT STATUS MIGRAINOSUS, NOT INTRACTABLE: Primary | ICD-10-CM

## 2022-11-10 PROCEDURE — 99214 OFFICE O/P EST MOD 30 MIN: CPT | Performed by: PHYSICIAN ASSISTANT

## 2022-11-10 RX ORDER — SUMATRIPTAN 50 MG/1
50 TABLET, FILM COATED ORAL
Qty: 9 TABLET | Refills: 1 | Status: SHIPPED | OUTPATIENT
Start: 2022-11-10

## 2022-11-10 RX ORDER — AMITRIPTYLINE HYDROCHLORIDE 10 MG/1
10 TABLET ORAL AT BEDTIME
Qty: 30 TABLET | Refills: 2 | Status: SHIPPED | OUTPATIENT
Start: 2022-11-10 | End: 2023-01-03

## 2022-11-10 ASSESSMENT — PAIN SCALES - GENERAL: PAINLEVEL: NO PAIN (1)

## 2022-11-10 ASSESSMENT — ENCOUNTER SYMPTOMS
HEADACHES: 1
NAUSEA: 1

## 2022-11-10 NOTE — PROGRESS NOTES
Assessment & Plan     Migraine with aura and without status migrainosus, not intractable  Tension-type headache, not intractable, unspecified chronicity pattern  Mix of tension type HA and migraines.   Discussed triggers- changes in sleep, diet, caffeine, hormonal, etc.   Migraine w/aura - dx teen years. Has never tried triptans. rx for imitrex for migraine w/aura.   Start amitriptyline at bedtime for prevention. Discussed possible side effects. Likely will benefit her disrupted sleep also.   Recheck in 2-3mo . Sooner if worsening or new concerns  - SUMAtriptan (IMITREX) 50 MG tablet; Take 1 tablet (50 mg) by mouth at onset of headache for migraine May repeat in 2 hours. Max 4 tablets/24 hours.  - amitriptyline (ELAVIL) 10 MG tablet; Take 1 tablet (10 mg) by mouth At Bedtime    HMA- due for pap- declines today     Follow Up: The patient was instructed to contact clinic for worsening symptoms, non-improvement in time frame as expected/discussed, and for questions regarding medications or treatment plan. For virtual visits, the patient was advised to be seen for in person evaluation if symptoms or condition are worsening or non-improvement as expected.       Return in about 2 months (around 1/10/2023).    MICHAEL Adame [facility] SARAH Hernandez is a 32 year old accompanied by her self, presenting for the following health issues:  Headache      Headache   This is a recurrent problem. The current episode started more than 1 week ago. Episode frequency: 5 days a week. The problem has not changed since onset.The headache is associated with nothing. Pain location: depends of headache for that day. The quality of the pain is described as sharp, dull and throbbing. The pain is at a severity of 3/10. The pain is moderate. The pain radiates to the right neck and upper back. Associated symptoms include nausea. She has tried acetaminophen and NSAIDs for the symptoms. The treatment  "provided mild relief.   History of Present Illness       Headaches:   Since the patient's last clinic visit, headaches are: no change  The patient is getting headaches:  Most days  She is able to do normal daily activities when she has a migraine.  The patient is taking the following rescue/relief medications:  Ibuprofen (Advil, Motrin) and Tylenol   Patient states \"I get some relief\" from the rescue/relief medications.   The patient is taking the following medications to prevent migraines:  No medications to prevent migraines  In the past 4 weeks, the patient has gone to an Urgent Care or Emergency Room 0 times times due to headaches.    She eats 2-3 servings of fruits and vegetables daily.She consumes 1 sweetened beverage(s) daily.She exercises with enough effort to increase her heart rate 30 to 60 minutes per day.  She exercises with enough effort to increase her heart rate 4 days per week.   She is taking medications regularly.     Headaches- 6 weeks   Most days getting a headache  Some times an annoyance , sometimes a \"full blown migraines\".   Migraine frequency- less often- 2x per month. Right sided , pounding, sharp, light sensitive, +N, no vomiting. About 50% of time has visual aura of \"sparkles\" in vision before migraines.   Non-migraines - frontal and radiates around and into neck.   Can't identify trigger for increase in headaches.  Treating headaches - 3x per week with OTC. Ibuprofen 400 or 600mg  OR tylenol 1000mg  No head injury/trauma.   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  Exercise - treadmill, weights. HIIT. 3-4x per week.   Eating consistently. Some intermittent fasting  Hydrating- gallon water daily  Caffeine- coffee in am, and 1 caffeinated pm drink   Sleep- wakes up few times per night. Not sure why waking. Long standing pattern  Stress- minimal     Dx migraines age 15yo. Never had prescription meds to treat    S/p endometrial ablation. Minimal spotting or no bleeding. " "      Review of Systems   Gastrointestinal: Positive for nausea.   Neurological: Positive for headaches.      Constitutional, HEENT, cardiovascular, pulmonary, gi and gu systems are negative, except as otherwise noted.      Objective    /69 (BP Location: Left arm, Patient Position: Sitting, Cuff Size: Adult Regular)   Pulse 74   Temp 98.4  F (36.9  C) (Temporal)   Resp 8   Ht 1.61 m (5' 3.39\")   Wt 62.9 kg (138 lb 9.6 oz)   SpO2 100%   Breastfeeding No   BMI 24.25 kg/m    Body mass index is 24.25 kg/m .  Physical Exam   GENERAL: healthy, alert and no distress  EYES: Eyes grossly normal to inspection, PERRL and conjunctivae and sclerae normal  HENT: ear canals and TM's normal, nose and mouth without ulcers or lesions  NECK: no adenopathy, no asymmetry, masses, or scars and thyroid normal to palpation  RESP: lungs clear to auscultation - no rales, rhonchi or wheezes  CV: regular rate and rhythm, normal S1 S2, no S3 or S4, no murmur, click or rub, no peripheral edema and peripheral pulses strong  ABDOMEN: soft, nontender, no hepatosplenomegaly, no masses and bowel sounds normal  MS: no gross musculoskeletal defects noted, no edema  NEURO:  CN 2-12 intact, cerebellar exam normal- fluid finger-nose pursuit and heel-shin trace. Reflexes symmetric patellar and brachial. Strength 5/5 and symmetric in extremities. Gait normal. Romberg negative/normal.   PSYCH: mentation appears normal, affect normal/bright  LYMPH: normal ant/post cervical, supraclavicular nodes                    "

## 2022-11-10 NOTE — PATIENT INSTRUCTIONS
Amitriptyline 10mg at bedtime nightly.  This is a daily medicine to stop cycle of daily headaches.     Sumatriptan - take at onset of MIGRAINE type headaches. With the aura.

## 2022-11-29 ENCOUNTER — E-VISIT (OUTPATIENT)
Dept: FAMILY MEDICINE | Facility: CLINIC | Age: 32
End: 2022-11-29
Payer: COMMERCIAL

## 2022-11-29 DIAGNOSIS — N39.0 ACUTE UTI (URINARY TRACT INFECTION): Primary | ICD-10-CM

## 2022-11-29 PROCEDURE — 99421 OL DIG E/M SVC 5-10 MIN: CPT | Performed by: PHYSICIAN ASSISTANT

## 2022-11-29 RX ORDER — NITROFURANTOIN 25; 75 MG/1; MG/1
100 CAPSULE ORAL 2 TIMES DAILY
Qty: 10 CAPSULE | Refills: 0 | Status: SHIPPED | OUTPATIENT
Start: 2022-11-29 | End: 2022-12-04

## 2022-11-30 NOTE — PATIENT INSTRUCTIONS
Dear Mary Powellatsky    After reviewing your responses, I've been able to diagnose you with a urinary tract infection, which is a common infection of the bladder with bacteria.  This is not a sexually transmitted infection, though urinating immediately after intercourse can help prevent infections.  Drinking lots of fluids is also helpful to clear your current infection and prevent the next one.      I have sent a prescription for antibiotics to your pharmacy to treat this infection.    It is important that you take all of your prescribed medication even if your symptoms are improving after a few doses.  Taking all of your medicine helps prevent the symptoms from returning.     If your symptoms worsen, you develop pain in your back or stomach, develop fevers, or are not improving in 5 days, please contact your primary care provider for an appointment or visit any of our convenient Walk-in or Urgent Care Centers to be seen, which can be found on our website here.    Thanks again for choosing us as your health care partner,    Paulie Price PA-C

## 2023-01-02 DIAGNOSIS — G43.109 MIGRAINE WITH AURA AND WITHOUT STATUS MIGRAINOSUS, NOT INTRACTABLE: ICD-10-CM

## 2023-01-02 DIAGNOSIS — G44.209 TENSION-TYPE HEADACHE, NOT INTRACTABLE, UNSPECIFIED CHRONICITY PATTERN: ICD-10-CM

## 2023-01-03 RX ORDER — AMITRIPTYLINE HYDROCHLORIDE 10 MG/1
TABLET ORAL
Qty: 30 TABLET | Refills: 2 | Status: SHIPPED | OUTPATIENT
Start: 2023-01-03 | End: 2023-04-07

## 2023-01-14 ENCOUNTER — HEALTH MAINTENANCE LETTER (OUTPATIENT)
Age: 33
End: 2023-01-14

## 2023-01-31 ENCOUNTER — E-VISIT (OUTPATIENT)
Dept: URGENT CARE | Facility: CLINIC | Age: 33
End: 2023-01-31
Payer: COMMERCIAL

## 2023-01-31 DIAGNOSIS — N39.0 ACUTE UTI (URINARY TRACT INFECTION): Primary | ICD-10-CM

## 2023-01-31 PROCEDURE — 99421 OL DIG E/M SVC 5-10 MIN: CPT | Performed by: EMERGENCY MEDICINE

## 2023-01-31 RX ORDER — NITROFURANTOIN 25; 75 MG/1; MG/1
100 CAPSULE ORAL 2 TIMES DAILY
Qty: 10 CAPSULE | Refills: 0 | Status: SHIPPED | OUTPATIENT
Start: 2023-01-31 | End: 2023-02-05

## 2023-01-31 NOTE — PATIENT INSTRUCTIONS
Dear Mary Wilkinsonky    After reviewing your responses, I've been able to diagnose you with a urinary tract infection, which is a common infection of the bladder with bacteria.  This is not a sexually transmitted infection, though urinating immediately after intercourse can help prevent infections.  Drinking lots of fluids is also helpful to clear your current infection and prevent the next one.      I have sent a prescription for antibiotics to your pharmacy to treat this infection.    It is important that you take all of your prescribed medication even if your symptoms are improving after a few doses.  Taking all of your medicine helps prevent the symptoms from returning.     If your symptoms worsen, you develop pain in your back or stomach, develop fevers, or are not improving in 5 days, please contact your primary care provider for an appointment or visit any of our convenient Walk-in or Urgent Care Centers to be seen, which can be found on our website here.    Thanks again for choosing us as your health care partner,    Ion Camacho MD    Urinary Tract Infections in Women  Urinary tract infections (UTIs) are most often caused by bacteria. These bacteria enter the urinary tract. The bacteria may come from inside the body. Or they may travel from the skin outside the rectum or vagina into the urethra. Female anatomy makes it easy for bacteria from the bowel to enter a woman s urinary tract, which is the most common source of UTI. This means women develop UTIs more often than men. Pain in or around the urinary tract is a common UTI symptom. But the only way to know for sure if you have a UTI for the healthcare provider to test your urine. The two tests that may be done are the urinalysis and urine culture.     Types of UTIs    Cystitis. A bladder infection (cystitis) is the most common UTI in women. You may have urgent or frequent need to pee. You may also have pain, burning when you pee, and bloody  urine.    Urethritis. This is an inflamed urethra, which is the tube that carries urine from the bladder to outside the body. You may have lower stomach or back pain. You may also have urgent or frequent need to pee.    Pyelonephritis. This is a kidney infection. If not treated, it can be serious and damage your kidneys. In severe cases, you may need to stay in the hospital. You may have a fever and lower back pain.    Medicines to treat a UTI  Most UTIs are treated with antibiotics. These kill the bacteria. The length of time you need to take them depends on the type of infection. It may be as short as 3 days. If you have repeated UTIs, you may need a low-dose antibiotic for several months. Take antibiotics exactly as directed. Don t stop taking them until all of the medicine is gone. If you stop taking the antibiotic too soon, the infection may not go away. You may also develop a resistance to the antibiotic. This can make it much harder to treat.   Lifestyle changes to treat and prevent UTIs   The lifestyle changes below will help get rid of your UTI. They may also help prevent future UTIs.     Drink plenty of fluids. This includes water, juice, or other caffeine-free drinks. Fluids help flush bacteria out of your body.    Empty your bladder. Always empty your bladder when you feel the urge to pee. And always pee before going to sleep. Urine that stays in your bladder can lead to infection. Try to pee before and after sex as well.    Practice good personal hygiene. Wipe yourself from front to back after using the toilet. This helps keep bacteria from getting into the urethra.    Use condoms during sex. These help prevent UTIs caused by sexually transmitted bacteria. Also don't use spermicides during sex. These can increase the risk for UTIs. Choose other forms of birth control instead. For women who tend to get UTIs after sex, a low-dose of a preventive antibiotic may be used. Be sure to discuss this option with  your healthcare provider.    Follow up with your healthcare provider as directed. He or she may test to make sure the infection has cleared. If needed, more treatment may be started.  Michele last reviewed this educational content on 7/1/2019 2000-2021 [employer]. All rights reserved. This information is not intended as a substitute for professional medical care. Always follow your healthcare professional's instructions.

## 2023-03-10 ENCOUNTER — MYC MEDICAL ADVICE (OUTPATIENT)
Dept: OBGYN | Facility: CLINIC | Age: 33
End: 2023-03-10

## 2023-03-10 ENCOUNTER — OFFICE VISIT (OUTPATIENT)
Dept: OBGYN | Facility: OTHER | Age: 33
End: 2023-03-10
Payer: COMMERCIAL

## 2023-03-10 VITALS — DIASTOLIC BLOOD PRESSURE: 77 MMHG | SYSTOLIC BLOOD PRESSURE: 117 MMHG | BODY MASS INDEX: 24.36 KG/M2 | WEIGHT: 139.2 LBS

## 2023-03-10 DIAGNOSIS — K40.90 INGUINAL HERNIA WITHOUT OBSTRUCTION OR GANGRENE, RECURRENCE NOT SPECIFIED, UNSPECIFIED LATERALITY: ICD-10-CM

## 2023-03-10 DIAGNOSIS — N39.0 FREQUENT URINARY TRACT INFECTIONS: ICD-10-CM

## 2023-03-10 DIAGNOSIS — Z00.00 ANNUAL PHYSICAL EXAM: Primary | ICD-10-CM

## 2023-03-10 DIAGNOSIS — Z98.890 S/P ENDOMETRIAL ABLATION: ICD-10-CM

## 2023-03-10 LAB
ALBUMIN UR-MCNC: NEGATIVE MG/DL
APPEARANCE UR: CLEAR
BACTERIA #/AREA URNS HPF: ABNORMAL /HPF
BILIRUB UR QL STRIP: NEGATIVE
COLOR UR AUTO: YELLOW
GLUCOSE UR STRIP-MCNC: NEGATIVE MG/DL
HGB UR QL STRIP: NEGATIVE
KETONES UR STRIP-MCNC: NEGATIVE MG/DL
LEUKOCYTE ESTERASE UR QL STRIP: ABNORMAL
NITRATE UR QL: NEGATIVE
PH UR STRIP: 7.5 [PH] (ref 5–7)
RBC #/AREA URNS AUTO: ABNORMAL /HPF
SP GR UR STRIP: 1.02 (ref 1–1.03)
SQUAMOUS #/AREA URNS AUTO: ABNORMAL /LPF
UROBILINOGEN UR STRIP-ACNC: 1 E.U./DL
WBC #/AREA URNS AUTO: ABNORMAL /HPF

## 2023-03-10 PROCEDURE — 99395 PREV VISIT EST AGE 18-39: CPT | Performed by: OBSTETRICS & GYNECOLOGY

## 2023-03-10 PROCEDURE — 81001 URINALYSIS AUTO W/SCOPE: CPT | Performed by: OBSTETRICS & GYNECOLOGY

## 2023-03-10 NOTE — PATIENT INSTRUCTIONS
PREVENTIVE HEALTH RECOMMENDATIONS:   Get a physical every year.  A pap test is important to have done starting at age 21 and then every three years as long as your pap is normal.  When you receive the results of your pap test it will include when you need to have your next pap test.    You should be tested each year for chlamydia and gonorrhea if you are aged 16-25 and if you have had a new sexual partner since you were last tested.   Vaccines: Get a flu shot each year.   Eat at least 8-10 servings of fruits and vegetables daily.  Eat whole-grain bread and cereal, whole-wheat pasta and brown rice instead of white grains and white rice.   For bone health: Eat calcium-rich foods (dairy products) or take calcium pills (500 to 600 mg with vitamin D) twice a day with food.   Exercise for an average of 30 minutes a day, 5 days of the week. It can be as simple as taking a brisk walk.  This will help you control your weight and prevent many diseases.   Limit alcohol to one drink per day.   Don't smoke and limit your exposure to second hand smoke.  If you smoke consider making a plan to quit. Go to [employer] and clink on   [employer]  for help   Wear sunscreen with at least SPF15 to prevent skin damage and skin cancer.   Brush your teeth twice a day and floss once a day and see your dentist twice a year for an exam and cleaning.   Have a great year and I will look forward to seeing you next year.   Jailyn Palacios DO    If you have labs or imaging done, the results will automatically release in [employer] without an interpretation.  Your health care professional will review those results and send an interpretation with recommendations as soon as possible, but this may be 1-3 business days.    If you have any questions regarding your visit, please contact your care team.     [employer] [facility]: [phone]  Women s Health CLINIC HOURS TELEPHONE NUMBER       DO Марина Watkins   BETTINA Meza-BOLA Winslow-BOLA Landeros-BOLA Martinez-  Tanika-     Monday- [place]  8:00 a.m - 5:00 p.m    Tuesday- [place]  8:00 a.m - 5:00 p.m    Wednesday- OFF    Thursday- Surgery     Friday- [place]  8:00 a.m - 5:00 p.m. [facility]  [address]  [place] [address]  [phone] Fax  Imaging [phone]    [facility]  [address] [place] [address] [place] [address]  [phone]    [address] [place] [address]  [phone] Fax  Imaging [phone]     Urgent Care locations:  [facility] Monday-Friday  10 am - 8 pm  Saturday and Sunday   9 am - 5 pm  Monday-Friday   10 am - 8 pm  Saturday and Sunday   9 am - 5 pm   [phone]     If you need a medication refill, please contact your pharmacy. Please allow 3 business days for your refill to be completed.    As always, thank you for trusting us with your healthcare needs!

## 2023-03-10 NOTE — PROGRESS NOTES
Chief Complaint   Patient presents with     Physical       Subjective  Mary Torres is a 32 year old female who presents for her annual exam. Patient states that she is doing well. She has a history of an ablation back on 21, which is working well for her.  She has a small amount of vaginal spotting when she would normally get a menses.  She has had 4 CS.  She has had a tubal ligation.  She has not had any abnormal pap smears. Headaches have been controlled with the Amitriptyline. She develops headaches 1-2 times per week and uses the Imitrex PRN.     Other concerns:  1) Frequent UTIs. She was last treated with Macrobid on 23. She developed cloudy urine with an odor on 23.   2) Lump located in groin. Patient had a tummy tuck back in . After the procedure there were bilateral lumps located near the pelvic bone on each side. Over the past few days the lump located on the right side in the groin has increased in size (bouncy ball) and has become more painful.     Most recent pap: 2019  History of abnormal Pap smear: No  History of STI's: No  History of PID: No    Family history of uterine cancer: No  Family history of ovarian cancer: No  Family history of colon cancer: No  Family history of breast cancer: Sure feels Maternal Grandma was diagnosed. Unsure at what age. Is .     ROS  ROS: 10 point ROS neg other than the symptoms noted above in the HPI.    Past Medical History:   Diagnosis Date     Asthma, intermittent      Past Surgical History:   Procedure Laterality Date      SECTION  2013    Failure to descend     DILATION AND CURETTAGE, HYSTEROSCOPY, ABLATE ENDOMETRIUM NOVASURE, COMBINED N/A 2021    Procedure: Hysteroscopy dilation and curettage, endometrial ablation with Novasure;  Surgeon: Jailyn Palacios DO;  Location: [facility] OR     ENDOMETRIAL ABLATION W/ NOVASURE N/A 2021    HD&C, EA with Novasure, Menometrorrhagia     ENT SURGERY      domenico  teeth     Family History   Problem Relation Age of Onset     Asthma Mother      Diabetes Father      Thyroid Disease Father      Cancer Paternal Grandmother         lung cancer     Asthma Maternal Grandmother      Asthma Sister      C.A.D. No family hx of      Hypertension No family hx of      Cerebrovascular Disease No family hx of      Breast Cancer No family hx of      Cancer - colorectal No family hx of      Prostate Cancer No family hx of      Alcohol/Drug No family hx of      Allergies No family hx of      Alzheimer Disease No family hx of      Anesthesia Reaction No family hx of      Arthritis No family hx of      Blood Disease No family hx of      Cardiovascular No family hx of      Circulatory No family hx of      Congenital Anomalies No family hx of      Connective Tissue Disorder No family hx of      Depression No family hx of      Endocrine Disease No family hx of      Eye Disorder No family hx of      Genetic Disorder No family hx of      Gastrointestinal Disease No family hx of      Genitourinary Problems No family hx of      Gynecology No family hx of      Heart Disease No family hx of      Lipids No family hx of      Musculoskeletal Disorder No family hx of      Neurologic Disorder No family hx of      Obesity No family hx of      Osteoporosis No family hx of      Psychotic Disorder No family hx of      Respiratory No family hx of      Family History Negative No family hx of      Hearing Loss No family hx of      Skin Cancer No family hx of      Social History     Tobacco Use     Smoking status: Never     Smokeless tobacco: Never   Substance Use Topics     Alcohol use: Yes     Comment: occ- 1 x per week       Tobacco abuse:  No  Do you get at least three servings of calcium containing foods daily (dairy, green leafy vegetables, etc.)? yes   Outside of work or daily activities, how many days per week do you exercise for 30 minutes or longer? 3-4x per week  The patient does not drink >3 drinks per day  nor >7 drinks per week.   Have you had an eye exam in the past two years? yes   Do you see a dentist twice per year? yes   Today's PHQ-2 Score:   Abuse: Current or Past(Physical, Sexual or Emotional)- No   Do you feel safe in your environment - Yes  Objective  Vitals: /77 (BP Location: Right arm, Cuff Size: Adult Regular)   Wt 63.1 kg (139 lb 3.2 oz)   BMI 24.36 kg/m    BMI= Body mass index is 24.36 kg/m .    General appearance=well developed, well-nourished female  Gait=normal  Psych=mood is stable, alert and oriented x3  HEENT=mucous membranes moist  Skin=no rashes or lesions seen,normal turgor   Breast:  Benign exam.  No masses noted.  Non tender. No skin changes, retraction, or nipple discharge.  Axilla feel completely normal, no lymph node enlargement and non-tender.  Neck=overall appearance is normal  Heart=RRR, no murmurs, no swelling noted  Thyroid=normal, no masses, no TTP, no enlargement  Lungs=non-labored breathing, no use of accessory muscles, clear to ausculation bilaterally  Abd=soft, Nontender/nondistended, +bowel sounds x4, no masses, no signs of hernias, no evidence of hepatosplenomegaly  PELVIC:    External genitalia: normal without lesions or masses  Urethral meatus: no lesions or prolapse noted, normal size  Urethra: no masses, non tender  Bladder: non tender, no fullness  Vagina: normal mucosa and rugae, no discharge.  Cervix: normal without lesion, no cervical motion tenderness, healthy  Uterus: small, mobile, nontender.  Adnexa: non tender, without masses  Rectal: deferred  Ext=no clubbing or cyanosis, no swelling      Last lipid profile: 2012  Regular self breast exam: Yes  Most recent mammogram:  NA  History of abnormal mammogram: NA  Fit testing: NA  DEXA: NA        Assessment/Plan  1.)  Annual/well woman exam=CBC, CMP, lipids, TSH, pap smear due but will do it next year  2.)  S/p endometrial ablation   3.)  Palpable groin lymph node vs hernia=will have patient follow up with  General Surgery  4.)  Dysuria=U/A today.  Urology referral        The following topics were discussed or recommended   Discussed seat belt, helmet and sunscreen use    Calcium/Vitamin D supplement=Recommended 1000 mg of calcium daily and 800 IU of vitamin D.  Contraception - HX of Ablation 2021    30 minutes were spent on the date of the encounter doing chart review, history and exam, documentation, and further activities as noted above.        Susie Hurst RN, FNP    I agree with the note above and addended it where appropriate.      Jailyn Palacios, DO

## 2023-03-23 ENCOUNTER — OFFICE VISIT (OUTPATIENT)
Dept: SURGERY | Facility: CLINIC | Age: 33
End: 2023-03-23
Payer: COMMERCIAL

## 2023-03-23 ENCOUNTER — TELEPHONE (OUTPATIENT)
Dept: SURGERY | Facility: CLINIC | Age: 33
End: 2023-03-23

## 2023-03-23 VITALS
HEIGHT: 63 IN | SYSTOLIC BLOOD PRESSURE: 118 MMHG | WEIGHT: 138 LBS | DIASTOLIC BLOOD PRESSURE: 72 MMHG | TEMPERATURE: 98 F | BODY MASS INDEX: 24.45 KG/M2

## 2023-03-23 DIAGNOSIS — Z00.00 ANNUAL PHYSICAL EXAM: ICD-10-CM

## 2023-03-23 DIAGNOSIS — R59.0 INGUINAL ADENOPATHY: Primary | ICD-10-CM

## 2023-03-23 DIAGNOSIS — K40.90 INGUINAL HERNIA WITHOUT OBSTRUCTION OR GANGRENE, RECURRENCE NOT SPECIFIED, UNSPECIFIED LATERALITY: ICD-10-CM

## 2023-03-23 LAB
ALBUMIN SERPL BCG-MCNC: 4.4 G/DL (ref 3.5–5.2)
ALP SERPL-CCNC: 62 U/L (ref 35–104)
ALT SERPL W P-5'-P-CCNC: 34 U/L (ref 10–35)
ANION GAP SERPL CALCULATED.3IONS-SCNC: 10 MMOL/L (ref 7–15)
AST SERPL W P-5'-P-CCNC: 28 U/L (ref 10–35)
BILIRUB SERPL-MCNC: 0.5 MG/DL
BUN SERPL-MCNC: 9.7 MG/DL (ref 6–20)
CALCIUM SERPL-MCNC: 9.3 MG/DL (ref 8.6–10)
CHLORIDE SERPL-SCNC: 100 MMOL/L (ref 98–107)
CHOLEST SERPL-MCNC: 175 MG/DL
CREAT SERPL-MCNC: 0.59 MG/DL (ref 0.51–0.95)
DEPRECATED HCO3 PLAS-SCNC: 27 MMOL/L (ref 22–29)
ERYTHROCYTE [DISTWIDTH] IN BLOOD BY AUTOMATED COUNT: 11.8 % (ref 10–15)
GFR SERPL CREATININE-BSD FRML MDRD: >90 ML/MIN/1.73M2
GLUCOSE SERPL-MCNC: 94 MG/DL (ref 70–99)
HCT VFR BLD AUTO: 37.3 % (ref 35–47)
HDLC SERPL-MCNC: 71 MG/DL
HGB BLD-MCNC: 12.8 G/DL (ref 11.7–15.7)
LDLC SERPL CALC-MCNC: 96 MG/DL
MCH RBC QN AUTO: 30.7 PG (ref 26.5–33)
MCHC RBC AUTO-ENTMCNC: 34.3 G/DL (ref 31.5–36.5)
MCV RBC AUTO: 89 FL (ref 78–100)
NONHDLC SERPL-MCNC: 104 MG/DL
PLATELET # BLD AUTO: 246 10E3/UL (ref 150–450)
POTASSIUM SERPL-SCNC: 3.9 MMOL/L (ref 3.4–5.3)
PROT SERPL-MCNC: 7.3 G/DL (ref 6.4–8.3)
RBC # BLD AUTO: 4.17 10E6/UL (ref 3.8–5.2)
SODIUM SERPL-SCNC: 137 MMOL/L (ref 136–145)
TRIGL SERPL-MCNC: 38 MG/DL
TSH SERPL DL<=0.005 MIU/L-ACNC: 1.09 UIU/ML (ref 0.3–4.2)
WBC # BLD AUTO: 5.8 10E3/UL (ref 4–11)

## 2023-03-23 PROCEDURE — 36415 COLL VENOUS BLD VENIPUNCTURE: CPT | Performed by: SPECIALIST

## 2023-03-23 PROCEDURE — 84443 ASSAY THYROID STIM HORMONE: CPT | Performed by: SPECIALIST

## 2023-03-23 PROCEDURE — 85027 COMPLETE CBC AUTOMATED: CPT | Performed by: SPECIALIST

## 2023-03-23 PROCEDURE — 80053 COMPREHEN METABOLIC PANEL: CPT | Performed by: SPECIALIST

## 2023-03-23 PROCEDURE — 99203 OFFICE O/P NEW LOW 30 MIN: CPT | Performed by: SPECIALIST

## 2023-03-23 PROCEDURE — 80061 LIPID PANEL: CPT | Performed by: SPECIALIST

## 2023-03-23 ASSESSMENT — PAIN SCALES - GENERAL: PAINLEVEL: NO PAIN (0)

## 2023-03-23 NOTE — PROGRESS NOTES
Consult requested by Dr. Palacios    Reason for consultation: Possible inguinal hernias    HPI:  Patient is a 32-year-old white female with a history of 4 C-sections and abdominoplasty last January.  After the procedure she noticed some lumps in both her groins.  She states the right side is recently got more painful.  It is worse with exercise.  She denies any trauma, fevers, chills, night sweats or other constitutional complaints.  She states the masses are always there.    Past Medical History:   Diagnosis Date     Asthma, intermittent      Past Surgical History:   Procedure Laterality Date      SECTION  2013    Failure to descend     DILATION AND CURETTAGE, HYSTEROSCOPY, ABLATE ENDOMETRIUM NOVASURE, COMBINED N/A 2021    Procedure: Hysteroscopy dilation and curettage, endometrial ablation with Novasure;  Surgeon: Jailyn Palacios DO;  Location: [facility] OR     ENDOMETRIAL ABLATION W/ NOVASURE N/A 2021    HD&C, EA with Novasure, Menometrorrhagia     ENT SURGERY      wisdom teeth     Current Outpatient Medications   Medication     albuterol (PROAIR HFA/PROVENTIL HFA/VENTOLIN HFA) 108 (90 Base) MCG/ACT inhaler     amitriptyline (ELAVIL) 10 MG tablet     fluticasone (FLOVENT HFA) 110 MCG/ACT inhaler     SUMAtriptan (IMITREX) 50 MG tablet     No current facility-administered medications for this visit.      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 Not Asked      Service No     Blood Transfusions No     Caffeine Concern No     Occupational Exposure No     Hobby Hazards No     Sleep Concern No     Stress Concern No     Weight Concern No     Special Diet No     Back Care No     Exercise Yes     Bike Helmet No     Comment: don't ride bike     Seat Belt Yes     Self-Exams Yes   Social History Narrative    Lives with spouse and daughter, in own home. One dog. No cats.      Social Determinants of Health     Financial Resource Strain: Not on file   Food Insecurity: Not on file   Transportation Needs: Not on file   Physical Activity: Not on file   Stress: Not on file   Social Connections: Not on file   Intimate Partner Violence: Not on file   Housing Stability: Not on file     Family History   Problem Relation Age of Onset     Asthma Mother      Diabetes Father      Thyroid Disease Father      Cancer Paternal Grandmother         lung cancer     Asthma Maternal Grandmother      Asthma Sister      C.A.D. No family hx of      Hypertension No family hx of      Cerebrovascular Disease No family hx of      Breast Cancer No family hx of      Cancer - colorectal No family hx of      Prostate Cancer No family hx of      Alcohol/Drug No family hx of      Allergies No family hx of      Alzheimer Disease No family hx of      Anesthesia Reaction No family hx of      Arthritis No family hx of      Blood Disease No family hx of      Cardiovascular No family hx of      Circulatory No family hx of      Congenital Anomalies No family hx of      Connective Tissue Disorder No family hx of      Depression No family hx of      Endocrine Disease No family hx of      Eye Disorder No family hx of      Genetic Disorder No family hx of      Gastrointestinal Disease No family hx of      Genitourinary Problems No family hx of      Gynecology No family hx of      Heart Disease No family hx of       Lipids No family hx of      Musculoskeletal Disorder No family hx of      Neurologic Disorder No family hx of      Obesity No family hx of      Osteoporosis No family hx of      Psychotic Disorder No family hx of      Respiratory No family hx of      Family History Negative No family hx of      Hearing Loss No family hx of      Skin Cancer No family hx of         ROS: 10 point ROS neg other than the symptoms noted above in the HPI.    PE:  B/P: 118/72, T: 98, P: Data Unavailable, R: Data Unavailable  General: well developed, well nourished WF who appears their stated age  HEENT: NC/AT, EOMI, (-)icterus, (-)injection  Neck: Supple, No JVD  Chest: CTA  Heart: S1, S2, (-)m/r/g  Abd: Soft, non tender, non distended, non tender, no masses, no obvious hernias   Ext; Warm, no edema, bilateral inguinal subcutaneous masses below the inguinal ligament.  Mobile right side is slightly tender.  The right is slightly larger than left.  Suspect adenopathy.  Psych: AAOx3  Neuro: No focal deficits      Impression/plan:  This is a 32-year-old with probable bilateral inguinal lymphadenopathy with a right greater than left.  As has been present almost a year I did recommend a biopsy of the larger side on the right.  The patient is in agreement with that plan.  The plan at this time is for a right inguinal lymph node excision under MAC anesthesia.   The procedure, risks (bleeding, infection, lymphedema), benefits, and alternatives were discussed and the patient agrees to proceed.  She will be scheduled the near future.    Tim Mckay MD, FACS

## 2023-03-23 NOTE — TELEPHONE ENCOUNTER
Type of surgery: INGUINAL LYMPHNODE BIOPSY    Location of surgery: [facility]  Date and time of surgery: 4/28  Surgeon: Woody  Pre-Op Appt Date: 4/11  Post-Op Appt Date: 5/8   Packet sent out: Yes  Pre-cert/Authorization completed:  Not Applicable  Date: na

## 2023-03-23 NOTE — LETTER
3/23/2023         RE: Mary Torres  [address] [facility] [address]        Dear Colleague,    Thank you for referring your patient, Mary Torres, to the [facility]. Please see a copy of my visit note below.    Consult requested by Dr. Palacios    Reason for consultation: Possible inguinal hernias    HPI:  Patient is a 32-year-old white female with a history of 4 C-sections and abdominoplasty last January.  After the procedure she noticed some lumps in both her groins.  She states the right side is recently got more painful.  It is worse with exercise.  She denies any trauma, fevers, chills, night sweats or other constitutional complaints.  She states the masses are always there.    Past Medical History:   Diagnosis Date     Asthma, intermittent      Past Surgical History:   Procedure Laterality Date      SECTION  2013    Failure to descend     DILATION AND CURETTAGE, HYSTEROSCOPY, ABLATE ENDOMETRIUM NOVASURE, COMBINED N/A 2021    Procedure: Hysteroscopy dilation and curettage, endometrial ablation with Novasure;  Surgeon: Jailyn Palacios DO;  Location: [facility] OR     ENDOMETRIAL ABLATION W/ NOVASURE N/A 2021    HD&C, EA with Novasure, Menometrorrhagia     ENT SURGERY      wisdom teeth     Current Outpatient Medications   Medication     albuterol (PROAIR HFA/PROVENTIL HFA/VENTOLIN HFA) 108 (90 Base) MCG/ACT inhaler     amitriptyline (ELAVIL) 10 MG tablet     fluticasone (FLOVENT HFA) 110 MCG/ACT inhaler     SUMAtriptan (IMITREX) 50 MG tablet     No current facility-administered medications for this visit.      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 Not Asked      Service No     Blood Transfusions No     Caffeine Concern No     Occupational Exposure No     Hobby Hazards No     Sleep Concern No     Stress Concern No     Weight Concern No     Special Diet No     Back Care No     Exercise Yes     Bike Helmet No     Comment: don't ride bike     Seat Belt Yes     Self-Exams Yes   Social History Narrative    Lives with spouse and daughter, in own home. One dog. No cats.      Social Determinants of Health     Financial Resource Strain: Not on file   Food Insecurity: Not on file   Transportation Needs: Not on file   Physical Activity: Not on file   Stress: Not on file   Social Connections: Not on file   Intimate Partner Violence: Not on file   Housing Stability: Not on file     Family History   Problem Relation Age of Onset     Asthma Mother      Diabetes Father      Thyroid Disease Father      Cancer Paternal Grandmother         lung cancer     Asthma Maternal Grandmother      Asthma Sister      C.A.D. No family hx of      Hypertension No family hx of      Cerebrovascular Disease No family hx of      Breast Cancer No family hx of      Cancer - colorectal No family hx of      Prostate Cancer No family hx of      Alcohol/Drug No family hx of      Allergies No family hx of      Alzheimer Disease No family hx of      Anesthesia Reaction No family hx of      Arthritis No family hx of      Blood Disease No family hx of      Cardiovascular No family hx of      Circulatory No family hx of      Congenital Anomalies No family hx of      Connective Tissue Disorder No family hx of      Depression No family hx of      Endocrine  Disease No family hx of      Eye Disorder No family hx of      Genetic Disorder No family hx of      Gastrointestinal Disease No family hx of      Genitourinary Problems No family hx of      Gynecology No family hx of      Heart Disease No family hx of      Lipids No family hx of      Musculoskeletal Disorder No family hx of      Neurologic Disorder No family hx of      Obesity No family hx of      Osteoporosis No family hx of      Psychotic Disorder No family hx of      Respiratory No family hx of      Family History Negative No family hx of      Hearing Loss No family hx of      Skin Cancer No family hx of         ROS: 10 point ROS neg other than the symptoms noted above in the HPI.    PE:  B/P: 118/72, T: 98, P: Data Unavailable, R: Data Unavailable  General: well developed, well nourished WF who appears their stated age  HEENT: NC/AT, EOMI, (-)icterus, (-)injection  Neck: Supple, No JVD  Chest: CTA  Heart: S1, S2, (-)m/r/g  Abd: Soft, non tender, non distended, non tender, no masses, no obvious hernias   Ext; Warm, no edema, bilateral inguinal subcutaneous masses below the inguinal ligament.  Mobile right side is slightly tender.  The right is slightly larger than left.  Suspect adenopathy.  Psych: AAOx3  Neuro: No focal deficits      Impression/plan:  This is a 32-year-old with probable bilateral inguinal lymphadenopathy with a right greater than left.  As has been present almost a year I did recommend a biopsy of the larger side on the right.  The patient is in agreement with that plan.  The plan at this time is for a right inguinal lymph node excision under MAC anesthesia.   The procedure, risks (bleeding, infection, lymphedema), benefits, and alternatives were discussed and the patient agrees to proceed.  She will be scheduled the near future.    Tim Mckay MD, FACS        Again, thank you for allowing me to participate in the care of your patient.        Sincerely,        Tim Mckay MD

## 2023-04-04 ENCOUNTER — OFFICE VISIT (OUTPATIENT)
Dept: UROLOGY | Facility: CLINIC | Age: 33
End: 2023-04-04
Attending: OBSTETRICS & GYNECOLOGY
Payer: COMMERCIAL

## 2023-04-04 VITALS
DIASTOLIC BLOOD PRESSURE: 80 MMHG | WEIGHT: 135 LBS | BODY MASS INDEX: 23.92 KG/M2 | HEIGHT: 63 IN | SYSTOLIC BLOOD PRESSURE: 132 MMHG

## 2023-04-04 DIAGNOSIS — N39.0 FREQUENT URINARY TRACT INFECTIONS: ICD-10-CM

## 2023-04-04 LAB
ALBUMIN UR-MCNC: NEGATIVE MG/DL
APPEARANCE UR: CLEAR
BILIRUB UR QL STRIP: NEGATIVE
COLOR UR AUTO: YELLOW
GLUCOSE UR STRIP-MCNC: NEGATIVE MG/DL
HGB UR QL STRIP: NEGATIVE
KETONES UR STRIP-MCNC: NEGATIVE MG/DL
LEUKOCYTE ESTERASE UR QL STRIP: NEGATIVE
NITRATE UR QL: NEGATIVE
PH UR STRIP: 6 [PH] (ref 5–7)
SP GR UR STRIP: 1.01 (ref 1–1.03)
UROBILINOGEN UR STRIP-MCNC: NORMAL MG/DL

## 2023-04-04 PROCEDURE — 52000 CYSTOURETHROSCOPY: CPT | Performed by: UROLOGY

## 2023-04-04 PROCEDURE — 81003 URINALYSIS AUTO W/O SCOPE: CPT | Performed by: UROLOGY

## 2023-04-04 PROCEDURE — 99205 OFFICE O/P NEW HI 60 MIN: CPT | Mod: 25 | Performed by: UROLOGY

## 2023-04-04 ASSESSMENT — PAIN SCALES - GENERAL: PAINLEVEL: NO PAIN (0)

## 2023-04-04 NOTE — PROGRESS NOTES
"Mary Torres is a 32 year old female seen in consultation for UTI's. Consult from Jailyn Palacios.      Pt reports 5 episodes of \"UTI's\" in the last 12 months. Her sx's are, in rank order:  1. \"Feeling worn down\"  2. Pain with voiding  3. Urinary urgency  4. Odor to urine      Pt states that she had recurrent UTI's as a youngster, was advised to undergo  eval but did not due to financial reasons.    Pt has had all 5 episodes rx with e-visits >> PO abx >> resolution of sx's in \"a day or two.\"    Does not believe that she has a UTI at this point. Specifically denies dysuria, gross hematuria, frequency, incontinence, prior  eval, hx bladder surgery, use of bladder meds.    Hx 4 c/s deliveries, tubal, unilat oopher.    Some constpation; rx with fiber with some relief. Does not eat breakfast.     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 Pt is a retired MA.    Drinks 1-2 caf coffee, occas tea and soda and \"a gallon or more\" of water \"so that I don't drink other things.\" (Did not complete voiding diary). Previously drank massive amounts of Mountain Dew and Cola \"to stay awake\" when she was working two shifts per day.      Reviewed PMH in detail including migranes, menorrhagia, asthma      Past Medical History:   Diagnosis Date     Asthma, intermittent        Past Surgical History:   Procedure Laterality Date      SECTION  2013    Failure to descend     DILATION AND CURETTAGE, HYSTEROSCOPY, ABLATE ENDOMETRIUM NOVASURE, COMBINED N/A 2021    Procedure: Hysteroscopy dilation and curettage, endometrial ablation with Novasure;  Surgeon: Jailyn Palacios DO;  Location: [facility]     ENDOMETRIAL ABLATION W/ NOVASURE N/A 2021    HD&C, EA with Novasure, Menometrorrhagia     ENT SURGERY      wisdom teeth       Social History     Socioeconomic History     Marital status:      Spouse name: Not on file     Number of children: 1     Years of education: Not on file     Highest " education level: Not on file   Occupational History     Employer: [employer]     Comment: MA-  @ [employer]   Tobacco Use     Smoking status: Never     Smokeless tobacco: Never   Vaping Use     Vaping status: Never Used     Passive vaping exposure: Yes   Substance and Sexual Activity     Alcohol use: Yes     Comment: occ- 1 x per week     Drug use: No     Sexual activity: Yes     Partners: Male     Birth control/protection: None     Comment: none   Other Topics Concern     Parent/sibling w/ CABG, MI or angioplasty before 65F 55M? Not Asked      Service No     Blood Transfusions No     Caffeine Concern No     Occupational Exposure No     Hobby Hazards No     Sleep Concern No     Stress Concern No     Weight Concern No     Special Diet No     Back Care No     Exercise Yes     Bike Helmet No     Comment: don't ride bike     Seat Belt Yes     Self-Exams Yes   Social History Narrative    Lives with spouse and daughter, in own home. One dog. No cats.      Social Determinants of Health     Financial Resource Strain: Not on file   Food Insecurity: Not on file   Transportation Needs: Not on file   Physical Activity: Not on file   Stress: Not on file   Social Connections: Not on file   Intimate Partner Violence: Not on file   Housing Stability: Not on file       Current Outpatient Medications   Medication Sig Dispense Refill     albuterol (PROAIR HFA/PROVENTIL HFA/VENTOLIN HFA) 108 (90 Base) MCG/ACT inhaler Inhale 2 puffs into the lungs every 6 hours as needed for shortness of breath / dyspnea 1 Inhaler 3     amitriptyline (ELAVIL) 10 MG tablet TAKE 1 TABLET BY MOUTH EVERYDAY AT BEDTIME 30 tablet 2     fluticasone (FLOVENT HFA) 110 MCG/ACT inhaler Inhale 2 puffs into the lungs 2 times daily 1 Inhaler 1     SUMAtriptan (IMITREX) 50 MG tablet Take 1 tablet (50 mg) by mouth at onset of headache for migraine May repeat in 2 hours. Max 4 tablets/24 hours. 9 tablet 1       Physical Exam:    GENL: NAD.    ABD: Soft,  "non-tender, no masses.    EG: Well-estrogenized, no masses.    VAGINA: Well-estrogenized, no masses.    BN HYPERMOBILITY: None.    CYSTOCELE: Grade 1.    APICAL PROLAPSE: None.    RECTOCELE: None.    BIMANUAL: No mass or tenderness.    Cysto:    (Informed consent obtained. Pause for cause performed)   Sterile prep.    17 Fr scope inserted through urethra. Systematic examination w 70 degree lens.   PVR: 100 cc   MUCOSA: Normal without lesion   ORIFICES: Normal location and morphology   CAPACITY: 900 cc; no pain with filling   Scope withdrawn without untoward effect.    (Pt tolerated procedure without difficulty).                  Results for orders placed or performed in visit on 04/04/23   UA reflex to Microscopic     Status: Normal   Result Value Ref Range    Color Urine Yellow Colorless, Straw, Light Yellow, Yellow    Appearance Urine Clear Clear    Glucose Urine Negative Negative mg/dL    Bilirubin Urine Negative Negative    Ketones Urine Negative Negative mg/dL    Specific Gravity Urine 1.008 1.003 - 1.035    Blood Urine Negative Negative    pH Urine 6.0 5.0 - 7.0    Protein Albumin Urine Negative Negative mg/dL    Urobilinogen Urine Normal Normal, 2.0 mg/dL    Nitrite Urine Negative Negative    Leukocyte Esterase Urine Negative Negative    Narrative    Microscopic not indicated         IMP:  1. Hx \"UTI's\" without documentation  2. Hx massive fluids, large bladder capacity      PLAN:  1. Discussed situation with patient in detail.  2. Stressed importance of UC prior to starting abx for presumed UTI  3. Consider moderation of fluids; discussed in detail; pt expresses understanding  4. F/u with us only PRN  5. Total time spent in reviewing patient records, discussing history, performing exam, discussing diagnosis, outlining treatment plan and documentation: 60 minutes        "

## 2023-04-07 DIAGNOSIS — G44.209 TENSION-TYPE HEADACHE, NOT INTRACTABLE, UNSPECIFIED CHRONICITY PATTERN: ICD-10-CM

## 2023-04-07 DIAGNOSIS — G43.109 MIGRAINE WITH AURA AND WITHOUT STATUS MIGRAINOSUS, NOT INTRACTABLE: ICD-10-CM

## 2023-04-07 RX ORDER — AMITRIPTYLINE HYDROCHLORIDE 10 MG/1
TABLET ORAL
Qty: 90 TABLET | Refills: 0 | Status: SHIPPED | OUTPATIENT
Start: 2023-04-07 | End: 2023-07-05

## 2023-04-11 ENCOUNTER — OFFICE VISIT (OUTPATIENT)
Dept: FAMILY MEDICINE | Facility: OTHER | Age: 33
End: 2023-04-11
Payer: COMMERCIAL

## 2023-04-11 VITALS
TEMPERATURE: 98.5 F | RESPIRATION RATE: 18 BRPM | OXYGEN SATURATION: 99 % | HEART RATE: 78 BPM | DIASTOLIC BLOOD PRESSURE: 60 MMHG | BODY MASS INDEX: 24.98 KG/M2 | SYSTOLIC BLOOD PRESSURE: 108 MMHG | HEIGHT: 63 IN | WEIGHT: 141 LBS

## 2023-04-11 DIAGNOSIS — Z12.4 CERVICAL CANCER SCREENING: ICD-10-CM

## 2023-04-11 DIAGNOSIS — Z01.818 PREOP GENERAL PHYSICAL EXAM: Primary | ICD-10-CM

## 2023-04-11 DIAGNOSIS — Z11.59 NEED FOR HEPATITIS C SCREENING TEST: ICD-10-CM

## 2023-04-11 DIAGNOSIS — R59.0 INGUINAL LYMPHADENOPATHY: ICD-10-CM

## 2023-04-11 DIAGNOSIS — J45.20 MILD INTERMITTENT ASTHMA WITHOUT COMPLICATION: ICD-10-CM

## 2023-04-11 DIAGNOSIS — Z98.51 S/P TUBAL LIGATION: ICD-10-CM

## 2023-04-11 DIAGNOSIS — Z98.890 S/P ENDOMETRIAL ABLATION: ICD-10-CM

## 2023-04-11 DIAGNOSIS — G44.209 TENSION-TYPE HEADACHE, NOT INTRACTABLE, UNSPECIFIED CHRONICITY PATTERN: ICD-10-CM

## 2023-04-11 DIAGNOSIS — Z98.891 S/P CESAREAN SECTION: ICD-10-CM

## 2023-04-11 DIAGNOSIS — G43.109 MIGRAINE WITH AURA AND WITHOUT STATUS MIGRAINOSUS, NOT INTRACTABLE: ICD-10-CM

## 2023-04-11 PROBLEM — J45.31 MILD PERSISTENT ASTHMA WITH ACUTE EXACERBATION: Status: ACTIVE | Noted: 2023-04-11

## 2023-04-11 PROCEDURE — 99214 OFFICE O/P EST MOD 30 MIN: CPT | Performed by: PHYSICIAN ASSISTANT

## 2023-04-11 RX ORDER — ALBUTEROL SULFATE 90 UG/1
2 AEROSOL, METERED RESPIRATORY (INHALATION) EVERY 6 HOURS PRN
Qty: 18 G | Refills: 1 | Status: SHIPPED | OUTPATIENT
Start: 2023-04-11

## 2023-04-11 RX ORDER — FLUTICASONE PROPIONATE 110 UG/1
2 AEROSOL, METERED RESPIRATORY (INHALATION) 2 TIMES DAILY
Qty: 12 G | Refills: 0 | Status: SHIPPED | OUTPATIENT
Start: 2023-04-11

## 2023-04-11 ASSESSMENT — PAIN SCALES - GENERAL: PAINLEVEL: NO PAIN (0)

## 2023-04-11 NOTE — H&P (VIEW-ONLY)
[address]  [facility] [address]  Phone: [phone]  Primary Provider: Jailyn Palacios  Pre-op Performing Provider: JOSÉ DUNN    PREOPERATIVE EVALUATION:  Today's date: 2023    Mary Torres is a 32 year old female who presents for a preoperative evaluation.      2023     8:41 AM   Additional Questions   Roomed by Lani   Accompanied by Self     Surgical Information:  Surgery/Procedure: INGUINAL LYMPHNODE BIOPSY  Surgery Location: [facility]  Surgeon: Dr. Mckay  Surgery Date: 23  Time of Surgery: TBD  Where patient plans to recover: At home with family  Fax number for surgical facility: Note does not need to be faxed, will be available electronically in Epic.    Assessment & Plan     The proposed surgical procedure is considered LOW risk.    Preop general physical exam  Inguinal lymphadenopathy - right  S/P tubal ligation  S/P  section  S/P endometrial ablation  Cleared for surgical intervention at this point in time without further concern.  No labs done today.  We did discuss urine pregnancy testing but after review of her surgical history decided against this.  She is well aware that she may have to submit for testing prior to surgery.  Advised that she take her albuterol inhaler along with her to surgery to make sure that she has something to treat any wheezing that may be present.  No concerns related to this today.    Need for hepatitis C screening test  Cervical cancer screening      Mild persistent asthma without complication  Doing very well no new concerns.  Refilled medications.  Follow-up urine.  - fluticasone (FLOVENT HFA) 110 MCG/ACT inhaler; Inhale 2 puffs into the lungs 2 times daily  - albuterol (PROAIR HFA/PROVENTIL HFA/VENTOLIN HFA) 108 (90 Base) MCG/ACT inhaler; Inhale 2 puffs into the lungs every 6 hours as needed for shortness of breath    Migraine with aura and without status migrainosus, not  intractable  Tension-type headache, not intractable, unspecified chronicity pattern  No new concerns with respect to this.  Has not used Imitrex much.  Follow-up with primary care provider strongly recommended.    Medication Instructions:  Patient is to take all scheduled medications on the day of surgery    RECOMMENDATION:  APPROVAL GIVEN to proceed with proposed procedure, without further diagnostic evaluation.    Subjective     HPI related to upcoming procedure: Patient has had inguinal lymphadenopathy for quite some time of uncertain etiology.  She has been recommended to have a biopsy of one of them to assess for this concern.        4/4/2023     7:01 PM   Preop Questions   1. Have you ever had a heart attack or stroke? No   2. Have you ever had surgery on your heart or blood vessels, such as a stent placement, a coronary artery bypass, or surgery on an artery in your head, neck, heart, or legs? No   3. Do you have chest pain with activity? No   4. Do you have a history of  heart failure? No   5. Do you currently have a cold, bronchitis or symptoms of other infection? No   6. Do you have a cough, shortness of breath, or wheezing? No   7. Do you or anyone in your family have previous history of blood clots? No   8. Do you or does anyone in your family have a serious bleeding problem such as prolonged bleeding following surgeries or cuts? No   9. Have you ever had problems with anemia or been told to take iron pills? YES -    10. Have you had any abnormal blood loss such as black, tarry or bloody stools, or abnormal vaginal bleeding? No   11. Have you ever had a blood transfusion? No   12. Are you willing to have a blood transfusion if it is medically needed before, during, or after your surgery? Yes   13. Have you or any of your relatives ever had problems with anesthesia? No   14. Do you have sleep apnea, excessive snoring or daytime drowsiness? No   15. Do you have any artifical heart valves or other implanted  medical devices like a pacemaker, defibrillator, or continuous glucose monitor? No   16. Do you have artificial joints? No   17. Are you allergic to latex? No   18. Is there any chance that you may be pregnant? No       Health Care Directive:  Patient does not have a Health Care Directive or Living Will:   Preoperative Review of :   reviewed - no record of controlled substances prescribed.    Review of Systems  CONSTITUTIONAL: NEGATIVE for fever, chills, change in weight  INTEGUMENTARY/SKIN: NEGATIVE for worrisome rashes, moles or lesions  EYES: NEGATIVE for vision changes or irritation  ENT/MOUTH: NEGATIVE for ear, mouth and throat problems  RESP: NEGATIVE for significant cough or SOB  CV: NEGATIVE for chest pain, palpitations or peripheral edema  GI: NEGATIVE for nausea, abdominal pain, heartburn, or change in bowel habits  : NEGATIVE for frequency, dysuria, or hematuria  MUSCULOSKELETAL: NEGATIVE for significant arthralgias or myalgia  NEURO: NEGATIVE for weakness, dizziness or paresthesias  ENDOCRINE: NEGATIVE for temperature intolerance, skin/hair changes  HEME: NEGATIVE for bleeding problems  PSYCHIATRIC: NEGATIVE for changes in mood or affect    Patient Active Problem List    Diagnosis Date Noted     S/P endometrial ablation 2021     Priority: Medium     Menorrhagia with regular cycle 2021     Priority: Medium     S/P  section 2020     Priority: Medium     LTBI (latent tuberculosis infection) 10/18/2012     Priority: Medium     Intermittent asthma 10/09/2012     Priority: Medium     CARDIOVASCULAR SCREENING; LDL GOAL LESS THAN 160 04/10/2012     Priority: Medium      Past Medical History:   Diagnosis Date     Asthma, intermittent      Past Surgical History:   Procedure Laterality Date      SECTION  2013    Failure to descend     DILATION AND CURETTAGE, HYSTEROSCOPY, ABLATE ENDOMETRIUM NOVASURE, COMBINED N/A 2021    Procedure: Hysteroscopy dilation and  curettage, endometrial ablation with Novasure;  Surgeon: Jailyn Palacios DO;  Location: [facility]     ENDOMETRIAL ABLATION W/ NOVASURE N/A 08/19/2021    HD&C, EA with Novasure, Menometrorrhagia     ENT SURGERY      wisdom teeth     Current Outpatient Medications   Medication Sig Dispense Refill     albuterol (PROAIR HFA/PROVENTIL HFA/VENTOLIN HFA) 108 (90 Base) MCG/ACT inhaler Inhale 2 puffs into the lungs every 6 hours as needed for shortness of breath / dyspnea 1 Inhaler 3     amitriptyline (ELAVIL) 10 MG tablet TAKE 1 TABLET BY MOUTH EVERYDAY AT BEDTIME 90 tablet 0     fluticasone (FLOVENT HFA) 110 MCG/ACT inhaler Inhale 2 puffs into the lungs 2 times daily 1 Inhaler 1     SUMAtriptan (IMITREX) 50 MG tablet Take 1 tablet (50 mg) by mouth at onset of headache for migraine May repeat in 2 hours. Max 4 tablets/24 hours. 9 tablet 1       No Known Allergies     Social History     Tobacco Use     Smoking status: Never     Smokeless tobacco: Never   Vaping Use     Vaping status: Never Used     Passive vaping exposure: Yes   Substance Use Topics     Alcohol use: Yes     Comment: occ- 1 x per week     Family History   Problem Relation Age of Onset     Asthma Mother      Diabetes Father      Thyroid Disease Father      Cancer Paternal Grandmother         lung cancer     Asthma Maternal Grandmother      Asthma Sister      C.A.D. No family hx of      Hypertension No family hx of      Cerebrovascular Disease No family hx of      Breast Cancer No family hx of      Cancer - colorectal No family hx of      Prostate Cancer No family hx of      Alcohol/Drug No family hx of      Allergies No family hx of      Alzheimer Disease No family hx of      Anesthesia Reaction No family hx of      Arthritis No family hx of      Blood Disease No family hx of      Cardiovascular No family hx of      Circulatory No family hx of      Congenital Anomalies No family hx of      Connective Tissue Disorder No family hx of      Depression No family  "hx of      Endocrine Disease No family hx of      Eye Disorder No family hx of      Genetic Disorder No family hx of      Gastrointestinal Disease No family hx of      Genitourinary Problems No family hx of      Gynecology No family hx of      Heart Disease No family hx of      Lipids No family hx of      Musculoskeletal Disorder No family hx of      Neurologic Disorder No family hx of      Obesity No family hx of      Osteoporosis No family hx of      Psychotic Disorder No family hx of      Respiratory No family hx of      Family History Negative No family hx of      Hearing Loss No family hx of      Skin Cancer No family hx of      History   Drug Use No         Objective     /60   Pulse 78   Temp 98.5  F (36.9  C) (Temporal)   Resp 18   Ht 1.61 m (5' 3.39\")   Wt 64 kg (141 lb)   SpO2 99%   BMI 24.67 kg/m      Physical Exam    GENERAL APPEARANCE: healthy, alert and no distress     EYES: EOMI, PERRL     HENT: ear canals and TM's normal and nose and mouth without ulcers or lesions     NECK: no adenopathy, no asymmetry, masses, or scars and thyroid normal to palpation     RESP: lungs clear to auscultation - no rales, rhonchi or wheezes     CV: regular rates and rhythm, normal S1 S2, no S3 or S4 and no murmur, click or rub     ABDOMEN:  soft, nontender, no HSM or masses and bowel sounds normal     MS: extremities normal- no gross deformities noted, no evidence of inflammation in joints, FROM in all extremities.     SKIN: no suspicious lesions or rashes     NEURO: Normal strength and tone, sensory exam grossly normal, mentation intact and speech normal     PSYCH: mentation appears normal. and affect normal/bright     LYMPHATICS: No cervical adenopathy    Recent Labs   Lab Test 03/23/23  1131 08/16/21  1008   HGB 12.8 12.7    247    138   POTASSIUM 3.9 3.6   CR 0.59 0.67        Diagnostics:  Labs pending at this time.  Results will be reviewed when available.   No EKG required for low risk " surgery (cataract, skin procedure, breast biopsy, etc).    Revised Cardiac Risk Index (RCRI):  The patient has the following serious cardiovascular risks for perioperative complications:   - No serious cardiac risks = 0 points     RCRI Interpretation: 1 point: Class II (low risk - 0.9% complication rate)           Signed Electronically by: Yogesh Hester PA-C  Copy of this evaluation report is provided to requesting physician.

## 2023-04-11 NOTE — PROGRESS NOTES
[address]  [facility] [address]  Phone: [phone]  Primary Provider: Jailyn Palacios  Pre-op Performing Provider: JOSÉ DUNN    PREOPERATIVE EVALUATION:  Today's date: 2023    Mary Torres is a 32 year old female who presents for a preoperative evaluation.      2023     8:41 AM   Additional Questions   Roomed by Lani   Accompanied by Self     Surgical Information:  Surgery/Procedure: INGUINAL LYMPHNODE BIOPSY  Surgery Location: [facility]  Surgeon: Dr. Mckay  Surgery Date: 23  Time of Surgery: TBD  Where patient plans to recover: At home with family  Fax number for surgical facility: Note does not need to be faxed, will be available electronically in Epic.    Assessment & Plan     The proposed surgical procedure is considered LOW risk.    Preop general physical exam  Inguinal lymphadenopathy - right  S/P tubal ligation  S/P  section  S/P endometrial ablation  Cleared for surgical intervention at this point in time without further concern.  No labs done today.  We did discuss urine pregnancy testing but after review of her surgical history decided against this.  She is well aware that she may have to submit for testing prior to surgery.  Advised that she take her albuterol inhaler along with her to surgery to make sure that she has something to treat any wheezing that may be present.  No concerns related to this today.    Need for hepatitis C screening test  Cervical cancer screening      Mild persistent asthma without complication  Doing very well no new concerns.  Refilled medications.  Follow-up urine.  - fluticasone (FLOVENT HFA) 110 MCG/ACT inhaler; Inhale 2 puffs into the lungs 2 times daily  - albuterol (PROAIR HFA/PROVENTIL HFA/VENTOLIN HFA) 108 (90 Base) MCG/ACT inhaler; Inhale 2 puffs into the lungs every 6 hours as needed for shortness of breath    Migraine with aura and without status migrainosus, not  intractable  Tension-type headache, not intractable, unspecified chronicity pattern  No new concerns with respect to this.  Has not used Imitrex much.  Follow-up with primary care provider strongly recommended.    Medication Instructions:  Patient is to take all scheduled medications on the day of surgery    RECOMMENDATION:  APPROVAL GIVEN to proceed with proposed procedure, without further diagnostic evaluation.    Subjective     HPI related to upcoming procedure: Patient has had inguinal lymphadenopathy for quite some time of uncertain etiology.  She has been recommended to have a biopsy of one of them to assess for this concern.        4/4/2023     7:01 PM   Preop Questions   1. Have you ever had a heart attack or stroke? No   2. Have you ever had surgery on your heart or blood vessels, such as a stent placement, a coronary artery bypass, or surgery on an artery in your head, neck, heart, or legs? No   3. Do you have chest pain with activity? No   4. Do you have a history of  heart failure? No   5. Do you currently have a cold, bronchitis or symptoms of other infection? No   6. Do you have a cough, shortness of breath, or wheezing? No   7. Do you or anyone in your family have previous history of blood clots? No   8. Do you or does anyone in your family have a serious bleeding problem such as prolonged bleeding following surgeries or cuts? No   9. Have you ever had problems with anemia or been told to take iron pills? YES -    10. Have you had any abnormal blood loss such as black, tarry or bloody stools, or abnormal vaginal bleeding? No   11. Have you ever had a blood transfusion? No   12. Are you willing to have a blood transfusion if it is medically needed before, during, or after your surgery? Yes   13. Have you or any of your relatives ever had problems with anesthesia? No   14. Do you have sleep apnea, excessive snoring or daytime drowsiness? No   15. Do you have any artifical heart valves or other implanted  medical devices like a pacemaker, defibrillator, or continuous glucose monitor? No   16. Do you have artificial joints? No   17. Are you allergic to latex? No   18. Is there any chance that you may be pregnant? No       Health Care Directive:  Patient does not have a Health Care Directive or Living Will:   Preoperative Review of :   reviewed - no record of controlled substances prescribed.    Review of Systems  CONSTITUTIONAL: NEGATIVE for fever, chills, change in weight  INTEGUMENTARY/SKIN: NEGATIVE for worrisome rashes, moles or lesions  EYES: NEGATIVE for vision changes or irritation  ENT/MOUTH: NEGATIVE for ear, mouth and throat problems  RESP: NEGATIVE for significant cough or SOB  CV: NEGATIVE for chest pain, palpitations or peripheral edema  GI: NEGATIVE for nausea, abdominal pain, heartburn, or change in bowel habits  : NEGATIVE for frequency, dysuria, or hematuria  MUSCULOSKELETAL: NEGATIVE for significant arthralgias or myalgia  NEURO: NEGATIVE for weakness, dizziness or paresthesias  ENDOCRINE: NEGATIVE for temperature intolerance, skin/hair changes  HEME: NEGATIVE for bleeding problems  PSYCHIATRIC: NEGATIVE for changes in mood or affect    Patient Active Problem List    Diagnosis Date Noted     S/P endometrial ablation 2021     Priority: Medium     Menorrhagia with regular cycle 2021     Priority: Medium     S/P  section 2020     Priority: Medium     LTBI (latent tuberculosis infection) 10/18/2012     Priority: Medium     Intermittent asthma 10/09/2012     Priority: Medium     CARDIOVASCULAR SCREENING; LDL GOAL LESS THAN 160 04/10/2012     Priority: Medium      Past Medical History:   Diagnosis Date     Asthma, intermittent      Past Surgical History:   Procedure Laterality Date      SECTION  2013    Failure to descend     DILATION AND CURETTAGE, HYSTEROSCOPY, ABLATE ENDOMETRIUM NOVASURE, COMBINED N/A 2021    Procedure: Hysteroscopy dilation and  curettage, endometrial ablation with Novasure;  Surgeon: Jailyn Palacios DO;  Location: [facility] OR     ENDOMETRIAL ABLATION W/ NOVASURE N/A 08/19/2021    HD&C, EA with Novasure, Menometrorrhagia     ENT SURGERY      wisdom teeth     Current Outpatient Medications   Medication Sig Dispense Refill     albuterol (PROAIR HFA/PROVENTIL HFA/VENTOLIN HFA) 108 (90 Base) MCG/ACT inhaler Inhale 2 puffs into the lungs every 6 hours as needed for shortness of breath / dyspnea 1 Inhaler 3     amitriptyline (ELAVIL) 10 MG tablet TAKE 1 TABLET BY MOUTH EVERYDAY AT BEDTIME 90 tablet 0     fluticasone (FLOVENT HFA) 110 MCG/ACT inhaler Inhale 2 puffs into the lungs 2 times daily 1 Inhaler 1     SUMAtriptan (IMITREX) 50 MG tablet Take 1 tablet (50 mg) by mouth at onset of headache for migraine May repeat in 2 hours. Max 4 tablets/24 hours. 9 tablet 1       No Known Allergies     Social History     Tobacco Use     Smoking status: Never     Smokeless tobacco: Never   Vaping Use     Vaping status: Never Used     Passive vaping exposure: Yes   Substance Use Topics     Alcohol use: Yes     Comment: occ- 1 x per week     Family History   Problem Relation Age of Onset     Asthma Mother      Diabetes Father      Thyroid Disease Father      Cancer Paternal Grandmother         lung cancer     Asthma Maternal Grandmother      Asthma Sister      C.A.D. No family hx of      Hypertension No family hx of      Cerebrovascular Disease No family hx of      Breast Cancer No family hx of      Cancer - colorectal No family hx of      Prostate Cancer No family hx of      Alcohol/Drug No family hx of      Allergies No family hx of      Alzheimer Disease No family hx of      Anesthesia Reaction No family hx of      Arthritis No family hx of      Blood Disease No family hx of      Cardiovascular No family hx of      Circulatory No family hx of      Congenital Anomalies No family hx of      Connective Tissue Disorder No family hx of      Depression No family  "hx of      Endocrine Disease No family hx of      Eye Disorder No family hx of      Genetic Disorder No family hx of      Gastrointestinal Disease No family hx of      Genitourinary Problems No family hx of      Gynecology No family hx of      Heart Disease No family hx of      Lipids No family hx of      Musculoskeletal Disorder No family hx of      Neurologic Disorder No family hx of      Obesity No family hx of      Osteoporosis No family hx of      Psychotic Disorder No family hx of      Respiratory No family hx of      Family History Negative No family hx of      Hearing Loss No family hx of      Skin Cancer No family hx of      History   Drug Use No         Objective     /60   Pulse 78   Temp 98.5  F (36.9  C) (Temporal)   Resp 18   Ht 1.61 m (5' 3.39\")   Wt 64 kg (141 lb)   SpO2 99%   BMI 24.67 kg/m      Physical Exam    GENERAL APPEARANCE: healthy, alert and no distress     EYES: EOMI, PERRL     HENT: ear canals and TM's normal and nose and mouth without ulcers or lesions     NECK: no adenopathy, no asymmetry, masses, or scars and thyroid normal to palpation     RESP: lungs clear to auscultation - no rales, rhonchi or wheezes     CV: regular rates and rhythm, normal S1 S2, no S3 or S4 and no murmur, click or rub     ABDOMEN:  soft, nontender, no HSM or masses and bowel sounds normal     MS: extremities normal- no gross deformities noted, no evidence of inflammation in joints, FROM in all extremities.     SKIN: no suspicious lesions or rashes     NEURO: Normal strength and tone, sensory exam grossly normal, mentation intact and speech normal     PSYCH: mentation appears normal. and affect normal/bright     LYMPHATICS: No cervical adenopathy    Recent Labs   Lab Test 03/23/23  1131 08/16/21  1008   HGB 12.8 12.7    247    138   POTASSIUM 3.9 3.6   CR 0.59 0.67        Diagnostics:  Labs pending at this time.  Results will be reviewed when available.   No EKG required for low risk " surgery (cataract, skin procedure, breast biopsy, etc).    Revised Cardiac Risk Index (RCRI):  The patient has the following serious cardiovascular risks for perioperative complications:   - No serious cardiac risks = 0 points     RCRI Interpretation: 1 point: Class II (low risk - 0.9% complication rate)           Signed Electronically by: Yogesh Hester PA-C  Copy of this evaluation report is provided to requesting physician.

## 2023-04-27 ENCOUNTER — ANESTHESIA EVENT (OUTPATIENT)
Dept: SURGERY | Facility: CLINIC | Age: 33
End: 2023-04-27
Payer: COMMERCIAL

## 2023-04-28 ENCOUNTER — ANESTHESIA (OUTPATIENT)
Dept: SURGERY | Facility: CLINIC | Age: 33
End: 2023-04-28
Payer: COMMERCIAL

## 2023-04-28 ENCOUNTER — HOSPITAL ENCOUNTER (OUTPATIENT)
Facility: CLINIC | Age: 33
Discharge: HOME | End: 2023-04-28
Attending: SPECIALIST | Admitting: SPECIALIST
Payer: COMMERCIAL

## 2023-04-28 VITALS
RESPIRATION RATE: 16 BRPM | SYSTOLIC BLOOD PRESSURE: 115 MMHG | DIASTOLIC BLOOD PRESSURE: 60 MMHG | OXYGEN SATURATION: 99 % | HEART RATE: 79 BPM | BODY MASS INDEX: 23.62 KG/M2 | TEMPERATURE: 97.8 F | WEIGHT: 135 LBS

## 2023-04-28 DIAGNOSIS — G89.18 POSTOPERATIVE PAIN: Primary | ICD-10-CM

## 2023-04-28 PROCEDURE — 370N000017 HC ANESTHESIA TECHNICAL FEE, PER MIN: Performed by: SPECIALIST

## 2023-04-28 PROCEDURE — 88341 IMHCHEM/IMCYTCHM EA ADD ANTB: CPT | Mod: 26 | Performed by: PATHOLOGY

## 2023-04-28 PROCEDURE — 250N000011 HC RX IP 250 OP 636: Performed by: SPECIALIST

## 2023-04-28 PROCEDURE — 88307 TISSUE EXAM BY PATHOLOGIST: CPT | Mod: 26 | Performed by: PATHOLOGY

## 2023-04-28 PROCEDURE — 999N000141 HC STATISTIC PRE-PROCEDURE NURSING ASSESSMENT: Performed by: SPECIALIST

## 2023-04-28 PROCEDURE — 360N000075 HC SURGERY LEVEL 2, PER MIN: Performed by: SPECIALIST

## 2023-04-28 PROCEDURE — 258N000003 HC RX IP 258 OP 636: Performed by: NURSE ANESTHETIST, CERTIFIED REGISTERED

## 2023-04-28 PROCEDURE — 88189 FLOWCYTOMETRY/READ 16 & >: CPT | Performed by: PATHOLOGY

## 2023-04-28 PROCEDURE — 88185 FLOWCYTOMETRY/TC ADD-ON: CPT | Performed by: SPECIALIST

## 2023-04-28 PROCEDURE — 88184 FLOWCYTOMETRY/ TC 1 MARKER: CPT | Performed by: SPECIALIST

## 2023-04-28 PROCEDURE — 272N000001 HC OR GENERAL SUPPLY STERILE: Performed by: SPECIALIST

## 2023-04-28 PROCEDURE — 88342 IMHCHEM/IMCYTCHM 1ST ANTB: CPT | Mod: 26 | Performed by: PATHOLOGY

## 2023-04-28 PROCEDURE — 271N000001 HC OR GENERAL SUPPLY NON-STERILE: Performed by: SPECIALIST

## 2023-04-28 PROCEDURE — 710N000012 HC RECOVERY PHASE 2, PER MINUTE: Performed by: SPECIALIST

## 2023-04-28 PROCEDURE — 38531 OPEN BX/EXC INGUINOFEM NODES: CPT | Mod: RT | Performed by: SPECIALIST

## 2023-04-28 PROCEDURE — 250N000011 HC RX IP 250 OP 636: Performed by: NURSE ANESTHETIST, CERTIFIED REGISTERED

## 2023-04-28 PROCEDURE — 250N000009 HC RX 250: Performed by: SPECIALIST

## 2023-04-28 PROCEDURE — 88184 FLOWCYTOMETRY/ TC 1 MARKER: CPT | Performed by: PATHOLOGY

## 2023-04-28 PROCEDURE — 250N000009 HC RX 250: Performed by: NURSE ANESTHETIST, CERTIFIED REGISTERED

## 2023-04-28 PROCEDURE — 88313 SPECIAL STAINS GROUP 2: CPT | Mod: 26 | Performed by: PATHOLOGY

## 2023-04-28 PROCEDURE — 88307 TISSUE EXAM BY PATHOLOGIST: CPT | Mod: TC | Performed by: SPECIALIST

## 2023-04-28 RX ORDER — OXYCODONE HYDROCHLORIDE 5 MG/1
10 TABLET ORAL
Status: DISCONTINUED | OUTPATIENT
Start: 2023-04-28 | End: 2023-04-28 | Stop reason: HOSPADM

## 2023-04-28 RX ORDER — KETOROLAC TROMETHAMINE 30 MG/ML
INJECTION, SOLUTION INTRAMUSCULAR; INTRAVENOUS PRN
Status: DISCONTINUED | OUTPATIENT
Start: 2023-04-28 | End: 2023-04-28

## 2023-04-28 RX ORDER — FENTANYL CITRATE 50 UG/ML
25 INJECTION, SOLUTION INTRAMUSCULAR; INTRAVENOUS
Status: DISCONTINUED | OUTPATIENT
Start: 2023-04-28 | End: 2023-04-28 | Stop reason: HOSPADM

## 2023-04-28 RX ORDER — SODIUM CHLORIDE, SODIUM LACTATE, POTASSIUM CHLORIDE, CALCIUM CHLORIDE 600; 310; 30; 20 MG/100ML; MG/100ML; MG/100ML; MG/100ML
INJECTION, SOLUTION INTRAVENOUS CONTINUOUS
Status: DISCONTINUED | OUTPATIENT
Start: 2023-04-28 | End: 2023-04-28 | Stop reason: HOSPADM

## 2023-04-28 RX ORDER — CEFAZOLIN SODIUM/WATER 2 G/20 ML
2 SYRINGE (ML) INTRAVENOUS
Status: COMPLETED | OUTPATIENT
Start: 2023-04-28 | End: 2023-04-28

## 2023-04-28 RX ORDER — HYDROCODONE BITARTRATE AND ACETAMINOPHEN 5; 325 MG/1; MG/1
1-2 TABLET ORAL EVERY 6 HOURS PRN
Qty: 10 TABLET | Refills: 0 | Status: SHIPPED | OUTPATIENT
Start: 2023-04-28 | End: 2023-05-08

## 2023-04-28 RX ORDER — LIDOCAINE 40 MG/G
CREAM TOPICAL
Status: DISCONTINUED | OUTPATIENT
Start: 2023-04-28 | End: 2023-04-28 | Stop reason: HOSPADM

## 2023-04-28 RX ORDER — OXYCODONE HYDROCHLORIDE 5 MG/1
5 TABLET ORAL
Status: DISCONTINUED | OUTPATIENT
Start: 2023-04-28 | End: 2023-04-28 | Stop reason: HOSPADM

## 2023-04-28 RX ORDER — HYDROCODONE BITARTRATE AND ACETAMINOPHEN 5; 325 MG/1; MG/1
2 TABLET ORAL
Status: DISCONTINUED | OUTPATIENT
Start: 2023-04-28 | End: 2023-04-28 | Stop reason: HOSPADM

## 2023-04-28 RX ORDER — BUPIVACAINE HYDROCHLORIDE AND EPINEPHRINE 2.5; 5 MG/ML; UG/ML
INJECTION, SOLUTION INFILTRATION; PERINEURAL PRN
Status: DISCONTINUED | OUTPATIENT
Start: 2023-04-28 | End: 2023-04-28 | Stop reason: HOSPADM

## 2023-04-28 RX ORDER — CEFAZOLIN SODIUM/WATER 2 G/20 ML
2 SYRINGE (ML) INTRAVENOUS SEE ADMIN INSTRUCTIONS
Status: DISCONTINUED | OUTPATIENT
Start: 2023-04-28 | End: 2023-04-28 | Stop reason: HOSPADM

## 2023-04-28 RX ORDER — PROPOFOL 10 MG/ML
INJECTION, EMULSION INTRAVENOUS PRN
Status: DISCONTINUED | OUTPATIENT
Start: 2023-04-28 | End: 2023-04-28

## 2023-04-28 RX ORDER — DEXAMETHASONE SODIUM PHOSPHATE 4 MG/ML
INJECTION, SOLUTION INTRA-ARTICULAR; INTRALESIONAL; INTRAMUSCULAR; INTRAVENOUS; SOFT TISSUE PRN
Status: DISCONTINUED | OUTPATIENT
Start: 2023-04-28 | End: 2023-04-28

## 2023-04-28 RX ORDER — SODIUM CHLORIDE, SODIUM LACTATE, POTASSIUM CHLORIDE, CALCIUM CHLORIDE 600; 310; 30; 20 MG/100ML; MG/100ML; MG/100ML; MG/100ML
INJECTION, SOLUTION INTRAVENOUS CONTINUOUS PRN
Status: DISCONTINUED | OUTPATIENT
Start: 2023-04-28 | End: 2023-04-28

## 2023-04-28 RX ORDER — LIDOCAINE HYDROCHLORIDE 20 MG/ML
INJECTION, SOLUTION INFILTRATION; PERINEURAL PRN
Status: DISCONTINUED | OUTPATIENT
Start: 2023-04-28 | End: 2023-04-28

## 2023-04-28 RX ORDER — PROPOFOL 10 MG/ML
INJECTION, EMULSION INTRAVENOUS CONTINUOUS PRN
Status: DISCONTINUED | OUTPATIENT
Start: 2023-04-28 | End: 2023-04-28

## 2023-04-28 RX ORDER — ONDANSETRON 2 MG/ML
INJECTION INTRAMUSCULAR; INTRAVENOUS PRN
Status: DISCONTINUED | OUTPATIENT
Start: 2023-04-28 | End: 2023-04-28

## 2023-04-28 RX ORDER — FENTANYL CITRATE 50 UG/ML
INJECTION, SOLUTION INTRAMUSCULAR; INTRAVENOUS PRN
Status: DISCONTINUED | OUTPATIENT
Start: 2023-04-28 | End: 2023-04-28

## 2023-04-28 RX ADMIN — FENTANYL CITRATE 50 MCG: 50 INJECTION, SOLUTION INTRAMUSCULAR; INTRAVENOUS at 07:30

## 2023-04-28 RX ADMIN — Medication 2 G: at 07:30

## 2023-04-28 RX ADMIN — PROPOFOL 150 MCG/KG/MIN: 10 INJECTION, EMULSION INTRAVENOUS at 07:44

## 2023-04-28 RX ADMIN — LIDOCAINE HYDROCHLORIDE 50 MG: 20 INJECTION, SOLUTION INFILTRATION; PERINEURAL at 07:44

## 2023-04-28 RX ADMIN — MIDAZOLAM 1 MG: 1 INJECTION INTRAMUSCULAR; INTRAVENOUS at 07:30

## 2023-04-28 RX ADMIN — ONDANSETRON 4 MG: 2 INJECTION INTRAMUSCULAR; INTRAVENOUS at 07:46

## 2023-04-28 RX ADMIN — KETOROLAC TROMETHAMINE 30 MG: 30 INJECTION, SOLUTION INTRAMUSCULAR at 08:15

## 2023-04-28 RX ADMIN — DEXAMETHASONE SODIUM PHOSPHATE 10 MG: 4 INJECTION, SOLUTION INTRA-ARTICULAR; INTRALESIONAL; INTRAMUSCULAR; INTRAVENOUS; SOFT TISSUE at 07:46

## 2023-04-28 RX ADMIN — SODIUM CHLORIDE, POTASSIUM CHLORIDE, SODIUM LACTATE AND CALCIUM CHLORIDE: 600; 310; 30; 20 INJECTION, SOLUTION INTRAVENOUS at 07:33

## 2023-04-28 RX ADMIN — PROPOFOL 100 MG: 10 INJECTION, EMULSION INTRAVENOUS at 07:44

## 2023-04-28 ASSESSMENT — ACTIVITIES OF DAILY LIVING (ADL)
ADLS_ACUITY_SCORE: 35
ADLS_ACUITY_SCORE: 35

## 2023-04-28 NOTE — ANESTHESIA PREPROCEDURE EVALUATION
Anesthesia Pre-Procedure Evaluation    Patient: Mary Torres   MRN: [id number] : 1990        Procedure : Procedure(s):  INGUINAL LYMPHNODE BIOPSY          Past Medical History:   Diagnosis Date     Asthma, intermittent       Past Surgical History:   Procedure Laterality Date      SECTION  2013    Failure to descend     DILATION AND CURETTAGE, HYSTEROSCOPY, ABLATE ENDOMETRIUM NOVASURE, COMBINED N/A 2021    Procedure: Hysteroscopy dilation and curettage, endometrial ablation with Novasure;  Surgeon: Jailyn Palacios DO;  Location: [facility] OR     ENDOMETRIAL ABLATION W/ NOVASURE N/A 2021    HD&C, EA with Novasure, Menometrorrhagia     ENT SURGERY      wisdom teeth      No Known Allergies   Social History     Tobacco Use     Smoking status: Never     Smokeless tobacco: Never   Vaping Use     Vaping status: Never Used     Passive vaping exposure: Yes   Substance Use Topics     Alcohol use: Yes     Comment: occ- 1 x per week      Wt Readings from Last 1 Encounters:   23 64 kg (141 lb)        Anesthesia Evaluation   Pt has had prior anesthetic. Type: Regional and MAC.    No history of anesthetic complications       ROS/MED HX  ENT/Pulmonary:     (+) Intermittent, asthma Treatment: Inhaler prn,      Neurologic:  - neg neurologic ROS     Cardiovascular:  - neg cardiovascular ROS     METS/Exercise Tolerance:     Hematologic:  - neg hematologic  ROS     Musculoskeletal:  - neg musculoskeletal ROS     GI/Hepatic:  - neg GI/hepatic ROS     Renal/Genitourinary:  - neg Renal ROS     Endo:  - neg endo ROS     Psychiatric/Substance Use:  - neg psychiatric ROS     Infectious Disease:  - neg infectious disease ROS     Malignancy:  - neg malignancy ROS     Other:  - neg other ROS          Physical Exam    Airway  airway exam normal           Respiratory Devices and Support         Dental           Cardiovascular   cardiovascular exam normal          Pulmonary   pulmonary exam normal                 OUTSIDE LABS:  CBC:   Lab Results   Component Value Date    WBC 5.8 03/23/2023    WBC 5.9 08/16/2021    HGB 12.8 03/23/2023    HGB 12.7 08/16/2021    HCT 37.3 03/23/2023    HCT 37.3 08/16/2021     03/23/2023     08/16/2021     BMP:   Lab Results   Component Value Date     03/23/2023     08/16/2021    POTASSIUM 3.9 03/23/2023    POTASSIUM 3.6 08/16/2021    CHLORIDE 100 03/23/2023    CHLORIDE 103 08/16/2021    CO2 27 03/23/2023    CO2 31 08/16/2021    BUN 9.7 03/23/2023    BUN 11 08/16/2021    CR 0.59 03/23/2023    CR 0.67 08/16/2021    GLC 94 03/23/2023    GLC 85 08/16/2021     COAGS: No results found for: PTT, INR, FIBR  POC:   Lab Results   Component Value Date    HCG Negative 08/19/2021     HEPATIC:   Lab Results   Component Value Date    ALBUMIN 4.4 03/23/2023    PROTTOTAL 7.3 03/23/2023    ALT 34 03/23/2023    AST 28 03/23/2023    ALKPHOS 62 03/23/2023    BILITOTAL 0.5 03/23/2023     OTHER:   Lab Results   Component Value Date    ABRAM 9.3 03/23/2023    TSH 1.09 03/23/2023    SED 15 04/10/2012       Anesthesia Plan    ASA Status:  1   NPO Status:  NPO Appropriate    Anesthesia Type: MAC.     - Reason for MAC: straight local not clinically adequate   Induction: Intravenous, Propofol.   Maintenance: TIVA.        Consents    Anesthesia Plan(s) and associated risks, benefits, and realistic alternatives discussed. Questions answered and patient/representative(s) expressed understanding.    - Discussed:     - Discussed with:  Patient      - Extended Intubation/Ventilatory Support Discussed: No.      - Patient is DNR/DNI Status: No    Use of blood products discussed: No .     Postoperative Care    Pain management: IV analgesics, Oral pain medications.   PONV prophylaxis: Ondansetron (or other 5HT-3), Background Propofol Infusion     Comments:    Other Comments: The risks and benefits of anesthesia, and the alternatives where applicable, have been discussed with the patient, and they  wish to proceed.            Gabriel Rosas, APRN CRNA

## 2023-04-28 NOTE — ANESTHESIA POSTPROCEDURE EVALUATION
Patient: Mary Torres    Procedure: Procedure(s):  INGUINAL LYMPHNODE BIOPSY       Anesthesia Type:  MAC    Note:  Disposition: Outpatient   Postop Pain Control: Uneventful            Sign Out: Well controlled pain   PONV: No   Neuro/Psych: Uneventful            Sign Out: Acceptable/Baseline neuro status   Airway/Respiratory: Uneventful            Sign Out: Acceptable/Baseline resp. status   CV/Hemodynamics: Uneventful            Sign Out: Acceptable CV status   Other NRE: NONE   DID A NON-ROUTINE EVENT OCCUR? No    Event details/Postop Comments:  Pt was happy with anesthesia care.  No complications.  I will follow up with the pt if needed.           Last vitals:  Vitals Value Taken Time   BP 99/51 04/28/23 0820   Temp 97.8  F (36.6  C) 04/28/23 0820   Pulse 80 04/28/23 0820   Resp 14 04/28/23 0820   SpO2 96 % 04/28/23 0823   Vitals shown include unvalidated device data.    Electronically Signed By: ISELA Padgett CRNA  April 28, 2023  8:24 AM

## 2023-04-28 NOTE — OP NOTE
Procedure Date: 04/28/2023    PREOPERATIVE DIAGNOSIS:  Bilateral inguinal adenopathy.    POSTOPERATIVE DIAGNOSIS:  Bilateral inguinal adenopathy.    PROCEDURE PERFORMED:  Excision of right groin lymph node measuring 2.5 x 1 x 1 cm in size.    SURGEON:  Tim Mckay MD, FACS    ANESTHESIA:  MAC.    INDICATIONS FOR PROCEDURE:  A 32-year-old lady who has a history of 4 C-sections and abdominoplasty last January.  After the procedure, she developed inguinal adenopathy and was concerned about.  It did not resolve after 4 or 5 months, so she opted for a biopsy.    OPERATIVE FINDINGS:  Included a 2.5 x 1 x 1 cm hemorrhagic appearing lymph node.    DETAILS OF PROCEDURE:  With the cooperation of the patient in the preoperative holding area, the right groin was marked.  She was then taken to the operating room after receiving some sedation.  The right groin was prepped and draped in sterile fashion.  A timeout was performed confirming the day and the patient as well as the procedure to be performed.  The area was infiltrated with the local anesthetic and after adequate analgesia was achieved, a transverse incision was then made over the area.  Subcutaneous tissue was opened.  Cord was then bluntly dissected out with Metzenbaum scissors and found a hemorrhagic appearing node.  This was dissected free using Metzenbaum scissors.  Any feeding vessels were isolated using a right angle clamp and ligated using 3-0 Vicryls.  After dissecting out the lymph node, it was cut in half with half being sent to pathology and the other half in RPMI for lymphoma evaluation.  Hemostasis was achieved using cautery.  The subcutaneous tissue was reapproximated using 3-0 Vicryl.  The skin was closed using a running 4-0 Vicryl suture.  Exofin glue was applied.  The patient was taken from the operating room to recovery room in stable condition to be sent home.    Tim Mckay MD, FACS        D: 04/28/2023   T: 04/28/2023   MT: ade    Name:      CRISTINO MORENO  MRN:      [id number]        Account:        [id number]   :      1990           Procedure Date: 2023     Document: [id number]

## 2023-04-28 NOTE — BRIEF OP NOTE
[facility]    Brief Operative Note    Pre-operative diagnosis: Inguinal adenopathy [R59.0]  Post-operative diagnosis Same as pre-operative diagnosis    Procedure: Procedure(s):  INGUINAL LYMPHNODE BIOPSY  Surgeon: Surgeon(s) and Role:     * Tim Mckay MD - Primary  Anesthesia: MAC   Estimated Blood Loss: Minimal    Drains: None  Specimens:   ID Type Source Tests Collected by Time Destination   1 : Right inguinal lymph node Biopsy Lymph Node(s), Groin, Right FLOW CYTOMETRY, SURGICAL PATHOLOGY EXAM Tim Mckay MD 4/28/2023  8:04 AM    2 : Right inguinal lymph node Tissue Lymph Node(s), Groin, Right SURGICAL PATHOLOGY EXAM Tim Mckay MD 4/28/2023  8:10 AM      Findings:   2.5 x 1 x 1 cm hemorrhagic appearing lymph node.  Complications: None.  Implants: * No implants in log *      Tim Mckay MD, FACS    #[id number]

## 2023-04-28 NOTE — ANESTHESIA CARE TRANSFER NOTE
Patient: Mary Torres    Procedure: Procedure(s):  INGUINAL LYMPHNODE BIOPSY       Diagnosis: Inguinal adenopathy [R59.0]  Diagnosis Additional Information: No value filed.    Anesthesia Type:   MAC     Note:    Oropharynx: spontaneously breathing and oropharynx clear of all foreign objects  Level of Consciousness: awake  Oxygen Supplementation: room air    Independent Airway: airway patency satisfactory and stable  Dentition: dentition unchanged  Vital Signs Stable: post-procedure vital signs reviewed and stable  Report to RN Given: handoff report given  Patient transferred to: Phase II    Handoff Report: Identifed the Patient, Identified the Reponsible Provider, Reviewed the pertinent medical history, Discussed the surgical course, Reviewed Intra-OP anesthesia mangement and issues during anesthesia, Set expectations for post-procedure period and Allowed opportunity for questions and acknowledgement of understanding      Vitals:  Vitals Value Taken Time   BP 99/51 04/28/23 0820   Temp 97.8  F (36.6  C) 04/28/23 0820   Pulse 80 04/28/23 0820   Resp 14 04/28/23 0820   SpO2 96 % 04/28/23 0823   Vitals shown include unvalidated device data.    Electronically Signed By: ISELA Padgett CRNA  April 28, 2023  8:24 AM

## 2023-05-01 LAB
PATH REPORT.COMMENTS IMP SPEC: NORMAL
PATH REPORT.FINAL DX SPEC: NORMAL
PATH REPORT.MICROSCOPIC SPEC OTHER STN: NORMAL
PATH REPORT.RELEVANT HX SPEC: NORMAL

## 2023-05-08 ENCOUNTER — OFFICE VISIT (OUTPATIENT)
Dept: SURGERY | Facility: CLINIC | Age: 33
End: 2023-05-08
Payer: COMMERCIAL

## 2023-05-08 VITALS
WEIGHT: 135 LBS | TEMPERATURE: 97.4 F | DIASTOLIC BLOOD PRESSURE: 70 MMHG | SYSTOLIC BLOOD PRESSURE: 122 MMHG | BODY MASS INDEX: 23.62 KG/M2

## 2023-05-08 DIAGNOSIS — Z98.890 POST-OPERATIVE STATE: Primary | ICD-10-CM

## 2023-05-08 PROCEDURE — 99024 POSTOP FOLLOW-UP VISIT: CPT | Performed by: SPECIALIST

## 2023-05-08 ASSESSMENT — PAIN SCALES - GENERAL: PAINLEVEL: NO PAIN (1)

## 2023-05-08 NOTE — PROGRESS NOTES
Follow-up for right inguinal lymph node biopsy      Subjective:  Patient feels good.  No complaints.    Objective:  B/P: 122/70, T: 97.4, P: Data Unavailable, R: Data Unavailable  Right groin: Incision healing well      Path: Reactive lymphoid hyperplasia, negative for malignancy granulomas or necrosis.  Flow interpretation polytypic B cells no aberrant immuno phenotype on T cells    Assessment/plan:  Patient is status post a right groin lymph node biopsy.  Path was benign.  She will increase her activity as tolerated follow-up with me as necessary.    Tim Mckay MD, FACS

## 2023-05-08 NOTE — LETTER
5/8/2023         RE: Mary Torres  [address] [place] [address]        Dear Colleague,    Thank you for referring your patient, Mary Torres, to the [facility]. Please see a copy of my visit note below.    Follow-up for right inguinal lymph node biopsy      Subjective:  Patient feels good.  No complaints.    Objective:  B/P: 122/70, T: 97.4, P: Data Unavailable, R: Data Unavailable  Right groin: Incision healing well      Path: Reactive lymphoid hyperplasia, negative for malignancy granulomas or necrosis.  Flow interpretation polytypic B cells no aberrant immuno phenotype on T cells    Assessment/plan:  Patient is status post a right groin lymph node biopsy.  Path was benign.  She will increase her activity as tolerated follow-up with me as necessary.    Tim Mckay MD, FACS              Again, thank you for allowing me to participate in the care of your patient.        Sincerely,        Tim Mckay MD

## 2023-07-03 ENCOUNTER — NURSE TRIAGE (OUTPATIENT)
Dept: OBGYN | Facility: CLINIC | Age: 33
End: 2023-07-03
Payer: COMMERCIAL

## 2023-07-03 DIAGNOSIS — G44.209 TENSION-TYPE HEADACHE, NOT INTRACTABLE, UNSPECIFIED CHRONICITY PATTERN: ICD-10-CM

## 2023-07-03 DIAGNOSIS — G43.109 MIGRAINE WITH AURA AND WITHOUT STATUS MIGRAINOSUS, NOT INTRACTABLE: ICD-10-CM

## 2023-07-03 NOTE — TELEPHONE ENCOUNTER
Patient stated she was on the [place] this morning when she hit a bump and her face / mouth hit the plastic of the helmet causing a laceration on the inside of her lip.  She stated she has the bleeding under some control, only bleeding on and off.    Advised patient of home care per protocol.  Advised patient to seek emergency care for worsening symptoms per protocol.  Patient stated understanding.    Reason for Disposition    Minor cut or scratch    Additional Information    Negative: Major bleeding (e.g., actively dripping or spurting) and can't be stopped    Negative: Amputation    Negative: Shock suspected (e.g., cold/pale/clammy skin, too weak to stand, low BP, rapid pulse)    Negative: Knife wound (or other possibly deep cut) and to chest, abdomen, back, neck, or head    Negative: Self-injury (e.g., cutting, self-harm) and suicidal or out-of-control    Negative: Sounds like a life-threatening emergency to the triager    Negative: Animal bite and broken skin    Negative: Human bite and broken skin    Negative: Puncture wound    Negative: Bleeding and won't stop after 10 minutes of direct pressure (using correct technique)    Negative: Skin is split open or gaping (or length > 1/2 inch or 12 mm on the skin, 1/4 inch or 6 mm on the face)    Negative: Deep cut and can see bone or tendons    Negative: Cut over knuckle (MCP joint)    Negative: Sensation of something in the wound (i.e., retained object in wound)    Negative: Dirt in the wound and not removed with 15 minutes of scrubbing    Negative: Wound causes numbness (i.e., loss of sensation)    Negative: Wound causes weakness (i.e., decreased ability to move hand, finger, toe)    Negative: SEVERE pain and not improved 2 hours after pain medicine    Negative: Looks infected and large red area (> 2 inches or 5 cm) or streak    Negative: Fever and spreading red area or streak    Negative: Suspicious history for the injury    Negative: Looks infected (spreading  "redness, pus) and no fever    Negative: No prior tetanus shots (or is not fully vaccinated)    Negative: HIV positive or severe immunodeficiency (severely weak immune system) and DIRTY cut    Negative: Patient wants to be seen    Negative: Last tetanus shot > 5 years ago and DIRTY cut    Negative: Last tetanus shot > 10 years ago and CLEAN cut    Negative: After 14 days and wound isn't healed    Negative: Diabetes mellitus and minor cut or scratch on foot    Negative: Minor cut or scratch and from self-injury (e.g., cutting, self-harm) and stable (i.e., not suicidal, not out of control)    Answer Assessment - Initial Assessment Questions  1. APPEARANCE of INJURY: \"What does the injury look like?\"       About 1/2\" long, in middle gapped 2 mm, bleeds off and on inside lower lip    2. SIZE: \"How large is the cut?\"       1/2\" long    3. BLEEDING: \"Is it bleeding now?\" If Yes, ask: \"Is it difficult to stop?\"       Bleeding on and off    4. LOCATION: \"Where is the injury located?\"       Lip    5. ONSET: \"How long ago did the injury occur?\"       Approximately 3 hours ago    6. MECHANISM: \"Tell me how it happened.\"       4- oreilly    7. TETANUS: \"When was the last tetanus booster?\"      Up to date    8. PREGNANCY: \"Is there any chance you are pregnant?\" \"When was your last menstrual period?\"      No    Protocols used: CUTS AND [id number]    Rosario Hancock RN    "

## 2023-07-05 RX ORDER — AMITRIPTYLINE HYDROCHLORIDE 10 MG/1
TABLET ORAL
Qty: 90 TABLET | Refills: 0 | Status: SHIPPED | OUTPATIENT
Start: 2023-07-05

## 2024-04-21 ENCOUNTER — HEALTH MAINTENANCE LETTER (OUTPATIENT)
Age: 34
End: 2024-04-21

## 2024-04-29 ENCOUNTER — OFFICE VISIT (OUTPATIENT)
Dept: OBGYN | Facility: OTHER | Age: 34
End: 2024-04-29
Payer: COMMERCIAL

## 2024-04-29 VITALS — WEIGHT: 135.9 LBS | SYSTOLIC BLOOD PRESSURE: 128 MMHG | DIASTOLIC BLOOD PRESSURE: 73 MMHG | BODY MASS INDEX: 23.78 KG/M2

## 2024-04-29 DIAGNOSIS — Z00.00 ANNUAL PHYSICAL EXAM: Primary | ICD-10-CM

## 2024-04-29 DIAGNOSIS — Z90.721 S/P LEFT OOPHORECTOMY: ICD-10-CM

## 2024-04-29 DIAGNOSIS — R10.2 PELVIC PAIN IN FEMALE: ICD-10-CM

## 2024-04-29 DIAGNOSIS — N95.1 MENOPAUSAL SYNDROME (HOT FLASHES): ICD-10-CM

## 2024-04-29 PROCEDURE — 83001 ASSAY OF GONADOTROPIN (FSH): CPT | Performed by: OBSTETRICS & GYNECOLOGY

## 2024-04-29 PROCEDURE — 87624 HPV HI-RISK TYP POOLED RSLT: CPT | Performed by: OBSTETRICS & GYNECOLOGY

## 2024-04-29 PROCEDURE — G0145 SCR C/V CYTO,THINLAYER,RESCR: HCPCS | Performed by: OBSTETRICS & GYNECOLOGY

## 2024-04-29 PROCEDURE — 82670 ASSAY OF TOTAL ESTRADIOL: CPT | Performed by: OBSTETRICS & GYNECOLOGY

## 2024-04-29 PROCEDURE — 99395 PREV VISIT EST AGE 18-39: CPT | Performed by: OBSTETRICS & GYNECOLOGY

## 2024-04-29 PROCEDURE — 36415 COLL VENOUS BLD VENIPUNCTURE: CPT | Performed by: OBSTETRICS & GYNECOLOGY

## 2024-04-29 NOTE — PROGRESS NOTES
Chief Complaint   Patient presents with    Physical     Hormonal concerns       Subjective  Mary Torres is a 34 year old female who presents for her annual exam.  Patient had an endometrial ablation in .  She admits to vaginal spotting once a month since then, however now she is bleeding very inconsistently.  The bleeding is still just spotting.  Some cramping.  She has occasionally bilateral lower pelvic pain.  The left side is more consistently.  Patient had a left oophorectomy at the time of her last c section.  She has had 4 c sections with TL.  We discussed possible causes for this and the need for an ultrasound.  She will schedule that.  We discussed possible scar tissue given the pain is occasionally asso with movement.  Patient has been more constipated lately.  We discussed over the counter options.  No problems urinating.  Patient is sexually active.  Some dyspareunia for the last few months.  Everything hurts-positional and on entrance.  Using lubrication with no relief.  No vaginal spotting after.  Hot flashes since Nov.  They wake her up at night.  She wakes up drenched in sweat.  She feels she is not herself.     Most recent pap: 2019  History of abnormal Pap smear:  No    Family history of uterine cancer:  No  Family history of ovarian cancer: No  Family history of colon cancer: No  Family history of breast cancer:  Paternal mother    ROS  ROS: 10 point ROS neg other than the symptoms noted above in the HPI.    Past Medical History:   Diagnosis Date    Asthma, intermittent      Past Surgical History:   Procedure Laterality Date     SECTION  2013    Failure to descend    DILATION AND CURETTAGE, HYSTEROSCOPY, ABLATE ENDOMETRIUM NOVASURE, COMBINED N/A 2021    Procedure: Hysteroscopy dilation and curettage, endometrial ablation with Novasure;  Surgeon: Jailyn Palacios DO;  Location: [facility] OR    DISSECT LYMPH NODE INGUINAL Right 2023    Procedure: INGUINAL LYMPH NODE  BIOPSY RIGHT;  Surgeon: Tim Mckay MD;  Location: [facility] OR    ENDOMETRIAL ABLATION W/ DIPIKA N/A 08/19/2021    HD&C, EA with Dipika, Menometrorrhagia    ENT SURGERY      wisdom teeth     Family History   Problem Relation Age of Onset    Asthma Mother     Diabetes Father     Thyroid Disease Father     Cancer Paternal Grandmother         lung cancer    Asthma Maternal Grandmother     Asthma Sister     C.A.D. No family hx of     Hypertension No family hx of     Cerebrovascular Disease No family hx of     Breast Cancer No family hx of     Cancer - colorectal No family hx of     Prostate Cancer No family hx of     Alcohol/Drug No family hx of     Allergies No family hx of     Alzheimer Disease No family hx of     Anesthesia Reaction No family hx of     Arthritis No family hx of     Blood Disease No family hx of     Cardiovascular No family hx of     Circulatory No family hx of     Congenital Anomalies No family hx of     Connective Tissue Disorder No family hx of     Depression No family hx of     Endocrine Disease No family hx of     Eye Disorder No family hx of     Genetic Disorder No family hx of     Gastrointestinal Disease No family hx of     Genitourinary Problems No family hx of     Gynecology No family hx of     Heart Disease No family hx of     Lipids No family hx of     Musculoskeletal Disorder No family hx of     Neurologic Disorder No family hx of     Obesity No family hx of     Osteoporosis No family hx of     Psychotic Disorder No family hx of     Respiratory No family hx of     Family History Negative No family hx of     Hearing Loss No family hx of     Skin Cancer No family hx of      Social History     Tobacco Use    Smoking status: Never    Smokeless tobacco: Never   Substance Use Topics    Alcohol use: Yes     Comment: occ- 1 x per week       Tobacco abuse:  No  Do you get at least three servings of calcium containing foods daily (dairy, green leafy vegetables, etc.)? yes   Outside of work or  daily activities, how many days per week do you exercise for 30 minutes or longer? 3-4x per week  The patient does not drink >3 drinks per day nor >7 drinks per week.   Have you had an eye exam in the past two years? yes   Do you see a dentist twice per year? yes   Today's PHQ-2 Score:   Abuse: Current or Past(Physical, Sexual or Emotional)- No   Do you feel safe in your environment - Yes  Objective  Vitals: /73 (BP Location: Right arm, Patient Position: Sitting, Cuff Size: Adult Regular)   Wt 61.6 kg (135 lb 14.4 oz)   BMI 23.78 kg/m    BMI= Body mass index is 23.78 kg/m .    General appearance=well developed, well-nourished female  Gait=normal  Psych=mood is stable, alert and oriented x3  HEENT=mucous membranes moist  Skin=no rashes or lesions seen,normal turgor   Breast:  Benign exam.  No masses noted.  Non tender. No skin changes, retraction, or nipple discharge.  Axilla feel completely normal, no lymph node enlargement and non-tender.  Neck=overall appearance is normal  Heart=RRR, no murmurs, no swelling noted  Thyroid=normal, no masses, no TTP, no enlargement  Lungs=non-labored breathing, no use of accessory muscles, clear to ausculation bilaterally  Abd=soft, Nontender/nondistended, +bowel sounds x4, no masses, no signs of hernias, no evidence of hepatosplenomegaly  PELVIC:    External genitalia: normal without lesions or masses  Urethral meatus: no lesions or prolapse noted, normal size  Urethra: no masses, non tender  Bladder: non tender, no fullness  Vagina: normal mucosa and rugae, no discharge.  Cervix: normal without lesion, no cervical motion tenderness, healthy, multiparous, pap smear obtained  Uterus: small, mobile, nontender.  Adnexa: non tender, without masses  Rectal: deferred  Ext=no clubbing or cyanosis, no swelling      Last lipid profile: 2023  Regular self breast exam:   Yes  Most recent mammogram:   N/A  History of abnormal mammogram:  N/A  Fit testing:  N/A  DEXA:   N/A        Assessment/Plan  1.)  Annual/well woman exam=pap smear  2.)  Pelvic pain=pelvic ultrasound   3.)  Dyspareunia  4.)  Hot flashes=estrogen, FSH  5.)  S/p TL and left oophorectomy      The following topics were discussed or recommended   Discussed seat belt, helmet and sunscreen use   Calcium/Vitamin D supplement=Recommended 1000 mg of calcium daily and 800 IU of vitamin D.            Jailyn Palacios DO

## 2024-04-30 LAB
ESTRADIOL SERPL-MCNC: 33 PG/ML
FSH SERPL IRP2-ACNC: 3.8 MIU/ML

## 2024-05-02 LAB
BKR LAB AP GYN ADEQUACY: NORMAL
BKR LAB AP GYN INTERPRETATION: NORMAL
BKR LAB AP HPV REFLEX: NORMAL
BKR LAB AP PREVIOUS ABNORMAL: NORMAL
PATH REPORT.COMMENTS IMP SPEC: NORMAL
PATH REPORT.COMMENTS IMP SPEC: NORMAL
PATH REPORT.RELEVANT HX SPEC: NORMAL

## 2024-05-03 ENCOUNTER — ANCILLARY PROCEDURE (OUTPATIENT)
Dept: ULTRASOUND IMAGING | Facility: OTHER | Age: 34
End: 2024-05-03
Attending: OBSTETRICS & GYNECOLOGY
Payer: COMMERCIAL

## 2024-05-03 DIAGNOSIS — R10.2 PELVIC PAIN IN FEMALE: ICD-10-CM

## 2024-05-03 PROCEDURE — 76830 TRANSVAGINAL US NON-OB: CPT | Mod: TC | Performed by: RADIOLOGY

## 2024-05-03 PROCEDURE — 76856 US EXAM PELVIC COMPLETE: CPT | Mod: TC | Performed by: RADIOLOGY

## 2024-05-06 LAB
HUMAN PAPILLOMA VIRUS 16 DNA: NEGATIVE
HUMAN PAPILLOMA VIRUS 18 DNA: NEGATIVE
HUMAN PAPILLOMA VIRUS FINAL DIAGNOSIS: NORMAL
HUMAN PAPILLOMA VIRUS OTHER HR: NEGATIVE

## 2024-06-10 ENCOUNTER — OFFICE VISIT (OUTPATIENT)
Dept: OBGYN | Facility: OTHER | Age: 34
End: 2024-06-10
Payer: COMMERCIAL

## 2024-06-10 VITALS — SYSTOLIC BLOOD PRESSURE: 110 MMHG | WEIGHT: 138.2 LBS | DIASTOLIC BLOOD PRESSURE: 69 MMHG | BODY MASS INDEX: 24.18 KG/M2

## 2024-06-10 DIAGNOSIS — R10.2 PELVIC PAIN IN FEMALE: Primary | ICD-10-CM

## 2024-06-10 DIAGNOSIS — Z90.721 S/P LEFT OOPHORECTOMY: ICD-10-CM

## 2024-06-10 DIAGNOSIS — N92.0 MENORRHAGIA WITH REGULAR CYCLE: ICD-10-CM

## 2024-06-10 DIAGNOSIS — Z98.51 S/P TUBAL LIGATION: ICD-10-CM

## 2024-06-10 DIAGNOSIS — Z98.891 S/P CESAREAN SECTION: ICD-10-CM

## 2024-06-10 DIAGNOSIS — Z98.890 S/P ENDOMETRIAL ABLATION: ICD-10-CM

## 2024-06-10 PROCEDURE — 99214 OFFICE O/P EST MOD 30 MIN: CPT | Performed by: OBSTETRICS & GYNECOLOGY

## 2024-06-10 NOTE — PROGRESS NOTES
Subjective  34 year old non-pregnant female presents today to discuss treatment options.  I saw patient in April for her annual exam.  She complained of irregular vaginal bleeding and pelvic pain.  Patient had an endometrial ablation in . She admits to vaginal spotting once a month since then, however now she is bleeding very inconsistently. The bleeding is still just spotting. Some cramping. Patient has lower abdominal pain for the last almost year or so.  The left side is more constant.  Patient had a left oophorectomy at the time of her last c section. She has had 4 c sections with TL.  She has also had an abdominoplasty.  Patient deals with the pain on a regular basis lately.  She is very sick of the pain.  She is wanting something done for this.  She was having hot flashes.  We had discussed at the time of her annual exam possible treatment options for this.  She did try of the black cohosh and ginseng and states the hot flashes have improved.   Some dyspareunia for the last few months.  Everything hurts-positional and on entrance.  Using lubrication with no relief.  No vaginal spotting after.       I personally reviewed the ultrasound and the findings showed a small endometrial cyst..      ROS: 10 point ROS neg other than the symptoms noted above in the HPI.  Past Medical History:   Diagnosis Date    Asthma, intermittent      Past Surgical History:   Procedure Laterality Date     SECTION  2013    Failure to descend    DILATION AND CURETTAGE, HYSTEROSCOPY, ABLATE ENDOMETRIUM NOVASURE, COMBINED N/A 2021    Procedure: Hysteroscopy dilation and curettage, endometrial ablation with Novasure;  Surgeon: Jailyn Palacios DO;  Location: [facility]    DISSECT LYMPH NODE INGUINAL Right 2023    Procedure: INGUINAL LYMPH NODE BIOPSY RIGHT;  Surgeon: Tim Mckay MD;  Location: [facility]    ENDOMETRIAL ABLATION W/ NOVASURE N/A 2021    HD&C, EA with Novasure, Menometrorrhagia    ENT  SURGERY      wisdom teeth     Family History   Problem Relation Age of Onset    Asthma Mother     Diabetes Father     Thyroid Disease Father     Cancer Paternal Grandmother         lung cancer    Asthma Maternal Grandmother     Asthma Sister     C.A.D. No family hx of     Hypertension No family hx of     Cerebrovascular Disease No family hx of     Breast Cancer No family hx of     Cancer - colorectal No family hx of     Prostate Cancer No family hx of     Alcohol/Drug No family hx of     Allergies No family hx of     Alzheimer Disease No family hx of     Anesthesia Reaction No family hx of     Arthritis No family hx of     Blood Disease No family hx of     Cardiovascular No family hx of     Circulatory No family hx of     Congenital Anomalies No family hx of     Connective Tissue Disorder No family hx of     Depression No family hx of     Endocrine Disease No family hx of     Eye Disorder No family hx of     Genetic Disorder No family hx of     Gastrointestinal Disease No family hx of     Genitourinary Problems No family hx of     Gynecology No family hx of     Heart Disease No family hx of     Lipids No family hx of     Musculoskeletal Disorder No family hx of     Neurologic Disorder No family hx of     Obesity No family hx of     Osteoporosis No family hx of     Psychotic Disorder No family hx of     Respiratory No family hx of     Family History Negative No family hx of     Hearing Loss No family hx of     Skin Cancer No family hx of      Social History     Tobacco Use    Smoking status: Never    Smokeless tobacco: Never   Substance Use Topics    Alcohol use: Yes     Comment: occ- 1 x per week         Objective  Vitals: /69 (BP Location: Right arm, Cuff Size: Adult Regular)   Wt 62.7 kg (138 lb 3.2 oz)   BMI 24.18 kg/m    BMI= Body mass index is 24.18 kg/m .    General appearance=well developed, well-nourished female  Gait=normal  Psych=mood is stable, alert and oriented x3        Pelvic  ultrasound=5/3/2024:  FINDINGS:     UTERUS: 8.9 x 2.6 x 4.6 cm. Normal in size and position with no  masses.     ENDOMETRIUM: 3 mm. Normal smooth endometrium. There is a small cyst  abutting the anterior endometrium at the fundus measuring 7 x 5 x 8  mm, new since 4/23/2021.     RIGHT OVARY: 2.7 x 2.5 x 2.7 cm. Normal.     LEFT OVARY: Surgically absent.     No significant free fluid.                                                                      IMPRESSION:  1.  A small simple cyst abutting the endometrium at the fundus.  Otherwise, normal uterus.  2.  Normal right ovary. Left oophorectomy.       Assessment  1.)  Pelvic pain L>R  2.)  History of 4 c sections  3.)  History of abdominoplasty  4.)  S/p endometrial ablation   5.)  History of TL and left oophorectomy  6.)  Hot flashes  7.)  Dyspareunia      Plan  1.)  Schedule TLH with partner to assist=consent signed today  2.)  Schedule pre-op with PCP    Chronic, uncomplicated illness or injury and major surgery scheduled.  We discussed pelvic pain in detail.  We discussed how a hysterectomy can never guarantee that the pelvic pain will go away.  We discussed the possibility of her pelvic pain being related to scar tissue from all of her abdominal surgeries.  We discussed how pain from scar tissue does not improve after hysterectomy.  Because patient has had a tubal ligation as well as an endometrial ablation and is still having irregular vaginal bleeding, coupled with the pelvic pain, patient is wanting to proceed with a hysterectomy.  Given her significant abdominal surgical history we discussed the possibility of the laparoscopic hysterectomy being converted to an abdominal hysterectomy.  We discussed risks associated with hysterectomy in detail and how hers is at an increased risk of injury given again her multiple abdominal surgeries.  She is still wishing to proceed with this.  I discussed risks, benefits, and complications of surgery including but not  limited to bleeding, infection, damage to nearby organs including but not limited to bladder, bowel, ureters, nerves, and blood vessels as well as anesthesia risks.  Injury may result at the time of surgery or in a separate procedure.  We also discussed the possibility of a reoperation if the pathology came back abnormal.  All questions answered, and accepting these risks, the patient elects to proceed with the procedure.        Nursing notes read and reviewed    Jailyn Palacios DO

## 2024-06-11 ENCOUNTER — TELEPHONE (OUTPATIENT)
Dept: OBGYN | Facility: CLINIC | Age: 34
End: 2024-06-11
Payer: COMMERCIAL

## 2024-06-11 PROBLEM — R10.2 PELVIC PAIN IN FEMALE: Status: ACTIVE | Noted: 2024-06-11

## 2024-06-11 NOTE — TELEPHONE ENCOUNTER
[facility] SURGERY PLANNING/SCHEDULING WORKSHEET                                                     Mary Torres                :  1990  MRN:  [id number]  Home Phone [phone]   Work Phone Not on file.   Mobile [phone]         Surgeon: Jailyn Palacios DO    DIAGNOSIS:  Pelvic pain, metrorrhagia    SURGICAL PROCEDURE:  Total laparoscopic hysterectomy    Surgery Location:  [facility]  Patient Surgery Class:  SDS  Length of Procedure:  120 minutes  Type of anesthesia:  General    Multi-surgeon case: Yes. Dr. Santana  OR Assistant needed:   No  Vendor needed: No  Positioning:  Lithotomy  Laterality:  NA  Date requested:   When able    Special Equipment: Ligassure  Special Instructions for patient:  Not needed  Precautions:  NONE  :  NOT NEEDED    Sterilization consent:  Yes and was signed on 6/10/2024.    Preop: Pre-op options: PCP  Pre-surgery consult needed:  Not applicable.  Postop evaluation needed:  2 weeks and 6 weeks    ALLERGIES: No Known Allergies   BMI:There is no height or weight on file to calculate BMI.     The proposed surgical procedure is considered INTERMEDIATE risk.      Jailyn Palacios DO    2024

## 2024-06-11 NOTE — PATIENT INSTRUCTIONS
Please call if you any questions.    [address]   [address]  [phone]        Jailyn Palacios,

## 2024-06-12 ENCOUNTER — MYC MEDICAL ADVICE (OUTPATIENT)
Dept: OBGYN | Facility: OTHER | Age: 34
End: 2024-06-12
Payer: COMMERCIAL

## 2024-06-12 ENCOUNTER — TELEPHONE (OUTPATIENT)
Dept: OBGYN | Facility: CLINIC | Age: 34
End: 2024-06-12

## 2024-06-12 NOTE — TELEPHONE ENCOUNTER
[facility] SURGERY PLANNING/SCHEDULING WORKSHEET                                                     Mary Torres                :  1990  MRN:  [id number]  Home Phone [phone]   Work Phone Not on file.   Mobile [phone]         Surgeon: Jailyn Palacios DO     DIAGNOSIS:  Pelvic pain, metrorrhagia     SURGICAL PROCEDURE:  Total laparoscopic hysterectomy     Surgery Location:  [facility]  Patient Surgery Class:  SDS  Length of Procedure:  120 minutes  Type of anesthesia:  General     Multi-surgeon case: Yes. Dr. Santana  OR Assistant needed:   No  Vendor needed: No  Positioning:  Lithotomy  Laterality:  NA  Date requested:   When able     Special Equipment: Ligassure  Special Instructions for patient:  Not needed  Precautions:  NONE  :  NOT NEEDED     Sterilization consent:  Yes and was signed on 6/10/2024.     Preop: Pre-op options: PCP  Pre-surgery consult needed:  Not applicable.  Postop evaluation needed:  2 weeks and 6 weeks     ALLERGIES:   Allergies   No Known Allergies      BMI:There is no height or weight on file to calculate BMI.      The proposed surgical procedure is considered INTERMEDIATE risk.        Jailyn Palacios DO    2024   SURGERY SCHEDULING AND PRECERTIFICATION    Medical Record Number: [id number]  Mary Wilkinsonky  YOB: 1990   Phone: [phone] (home)   Primary Provider: Jailyn Palacios    Reason for Admit:  ICD-10 CODE:  R10.2, N92.0    Surgeon: Jailyn Palacios DO  Surgical Procedure: Total laparoscopic hysterectomy    Date of Surgery 10/29 Time of Surgery 12pm  Surgery to be performed at:  [facility]  Status: Outpatient  Type of Anesthesia Anticipated: General    Sterilization consent:  Yes and was signed on 06/10.    Pre-Op: On 10/15 with Dr Salvador at [facility]  COVID testing:  Per Provider's discretion Covid testing is not indicated.     Post-Op: Patient instructed to schedule 2 & 6  ximena with Dr Palacios (Clinic schedules not out for Nov. & Dec. Yet)    Pre-certification routed to [employer]:  Yes    Surgery packet mailed to patient's home address: Yes  Patient instructed NPO 12 hours prior to surgery, arrive 1.5 hours  prior to surgery, must have a .  Patient understood and agrees to the plan.      Requestor:  Mckenna Victor     Location:  [place] [id number]

## 2024-07-22 ENCOUNTER — MYC MEDICAL ADVICE (OUTPATIENT)
Dept: OBGYN | Facility: OTHER | Age: 34
End: 2024-07-22
Payer: COMMERCIAL

## 2024-08-26 ENCOUNTER — MYC MEDICAL ADVICE (OUTPATIENT)
Dept: OBGYN | Facility: OTHER | Age: 34
End: 2024-08-26

## 2024-08-27 NOTE — TELEPHONE ENCOUNTER
Spoke with patient and she would like to cancel surgery with Dr Palacios for 10/29 and hold off until February schedules come out.  Will reach back out then to reschedule.

## 2024-10-03 ENCOUNTER — MYC MEDICAL ADVICE (OUTPATIENT)
Dept: OBGYN | Facility: OTHER | Age: 34
End: 2024-10-03
Payer: COMMERCIAL

## 2025-01-10 ASSESSMENT — ASTHMA QUESTIONNAIRES
QUESTION_2 LAST FOUR WEEKS HOW OFTEN HAVE YOU HAD SHORTNESS OF BREATH: ONCE OR TWICE A WEEK
ACT_TOTALSCORE: 23
QUESTION_4 LAST FOUR WEEKS HOW OFTEN HAVE YOU USED YOUR RESCUE INHALER OR NEBULIZER MEDICATION (SUCH AS ALBUTEROL): ONCE A WEEK OR LESS
QUESTION_1 LAST FOUR WEEKS HOW MUCH OF THE TIME DID YOUR ASTHMA KEEP YOU FROM GETTING AS MUCH DONE AT WORK, SCHOOL OR AT HOME: NONE OF THE TIME
ACT_TOTALSCORE: 23
QUESTION_5 LAST FOUR WEEKS HOW WOULD YOU RATE YOUR ASTHMA CONTROL: COMPLETELY CONTROLLED
QUESTION_3 LAST FOUR WEEKS HOW OFTEN DID YOUR ASTHMA SYMPTOMS (WHEEZING, COUGHING, SHORTNESS OF BREATH, CHEST TIGHTNESS OR PAIN) WAKE YOU UP AT NIGHT OR EARLIER THAN USUAL IN THE MORNING: NOT AT ALL

## 2025-01-13 ENCOUNTER — OFFICE VISIT (OUTPATIENT)
Dept: FAMILY MEDICINE | Facility: CLINIC | Age: 35
End: 2025-01-13
Payer: COMMERCIAL

## 2025-01-13 ENCOUNTER — ANCILLARY PROCEDURE (OUTPATIENT)
Dept: GENERAL RADIOLOGY | Facility: OTHER | Age: 35
End: 2025-01-13
Attending: PHYSICIAN ASSISTANT
Payer: COMMERCIAL

## 2025-01-13 VITALS
HEIGHT: 63 IN | OXYGEN SATURATION: 100 % | TEMPERATURE: 98.7 F | HEART RATE: 71 BPM | BODY MASS INDEX: 24.27 KG/M2 | DIASTOLIC BLOOD PRESSURE: 70 MMHG | WEIGHT: 137 LBS | SYSTOLIC BLOOD PRESSURE: 120 MMHG

## 2025-01-13 DIAGNOSIS — K59.00 CONSTIPATION, UNSPECIFIED CONSTIPATION TYPE: ICD-10-CM

## 2025-01-13 DIAGNOSIS — R10.84 ABDOMINAL PAIN, GENERALIZED: ICD-10-CM

## 2025-01-13 DIAGNOSIS — R10.84 ABDOMINAL PAIN, GENERALIZED: Primary | ICD-10-CM

## 2025-01-13 LAB
ALBUMIN SERPL BCG-MCNC: 4.7 G/DL (ref 3.5–5.2)
ALBUMIN UR-MCNC: NEGATIVE MG/DL
ALP SERPL-CCNC: 53 U/L (ref 40–150)
ALT SERPL W P-5'-P-CCNC: 35 U/L (ref 0–50)
ANION GAP SERPL CALCULATED.3IONS-SCNC: 10 MMOL/L (ref 7–15)
APPEARANCE UR: CLEAR
AST SERPL W P-5'-P-CCNC: 26 U/L (ref 0–45)
BILIRUB SERPL-MCNC: 0.4 MG/DL
BILIRUB UR QL STRIP: NEGATIVE
BUN SERPL-MCNC: 12.5 MG/DL (ref 6–20)
CALCIUM SERPL-MCNC: 10 MG/DL (ref 8.8–10.4)
CHLORIDE SERPL-SCNC: 100 MMOL/L (ref 98–107)
COLOR UR AUTO: YELLOW
CREAT SERPL-MCNC: 0.62 MG/DL (ref 0.51–0.95)
EGFRCR SERPLBLD CKD-EPI 2021: >90 ML/MIN/1.73M2
ERYTHROCYTE [DISTWIDTH] IN BLOOD BY AUTOMATED COUNT: 12.1 % (ref 10–15)
GLUCOSE SERPL-MCNC: 95 MG/DL (ref 70–99)
GLUCOSE UR STRIP-MCNC: NEGATIVE MG/DL
HCO3 SERPL-SCNC: 30 MMOL/L (ref 22–29)
HCT VFR BLD AUTO: 38.1 % (ref 35–47)
HGB BLD-MCNC: 13.3 G/DL (ref 11.7–15.7)
HGB UR QL STRIP: NEGATIVE
KETONES UR STRIP-MCNC: NEGATIVE MG/DL
LEUKOCYTE ESTERASE UR QL STRIP: NEGATIVE
MCH RBC QN AUTO: 31.2 PG (ref 26.5–33)
MCHC RBC AUTO-ENTMCNC: 34.9 G/DL (ref 31.5–36.5)
MCV RBC AUTO: 89 FL (ref 78–100)
NITRATE UR QL: NEGATIVE
PH UR STRIP: 7.5 [PH] (ref 5–7)
PLATELET # BLD AUTO: 249 10E3/UL (ref 150–450)
POTASSIUM SERPL-SCNC: 4.2 MMOL/L (ref 3.4–5.3)
PROT SERPL-MCNC: 7.4 G/DL (ref 6.4–8.3)
RBC # BLD AUTO: 4.26 10E6/UL (ref 3.8–5.2)
SODIUM SERPL-SCNC: 140 MMOL/L (ref 135–145)
SP GR UR STRIP: 1.01 (ref 1–1.03)
UROBILINOGEN UR STRIP-ACNC: 0.2 E.U./DL
WBC # BLD AUTO: 5.9 10E3/UL (ref 4–11)

## 2025-01-13 PROCEDURE — 80053 COMPREHEN METABOLIC PANEL: CPT | Performed by: PHYSICIAN ASSISTANT

## 2025-01-13 PROCEDURE — 74018 RADEX ABDOMEN 1 VIEW: CPT | Mod: TC | Performed by: RADIOLOGY

## 2025-01-13 PROCEDURE — 99214 OFFICE O/P EST MOD 30 MIN: CPT | Performed by: PHYSICIAN ASSISTANT

## 2025-01-13 PROCEDURE — 36415 COLL VENOUS BLD VENIPUNCTURE: CPT | Performed by: PHYSICIAN ASSISTANT

## 2025-01-13 PROCEDURE — 85027 COMPLETE CBC AUTOMATED: CPT | Performed by: PHYSICIAN ASSISTANT

## 2025-01-13 PROCEDURE — 81003 URINALYSIS AUTO W/O SCOPE: CPT | Performed by: PHYSICIAN ASSISTANT

## 2025-01-13 ASSESSMENT — PAIN SCALES - GENERAL: PAINLEVEL_OUTOF10: NO PAIN (1)

## 2025-01-13 NOTE — PROGRESS NOTES
Assessment & Plan     Abdominal pain, generalized  Labs so far are normal, awaiting CMP and x-ray report.  Will plan for abdominal ultrasound certainly if symptoms acutely worsen she is to be seen emergently.  - CBC with platelets; Future  - Comprehensive metabolic panel (BMP + Alb, Alk Phos, ALT, AST, Total. Bili, TP); Future  - XR Abdomen 1 View; Future  - UA Macroscopic with reflex to Microscopic and Culture - Lab Collect; Future  - CBC with platelets  - Comprehensive metabolic panel (BMP + Alb, Alk Phos, ALT, AST, Total. Bili, TP)  - UA Macroscopic with reflex to Microscopic and Culture - Lab Collect    Constipation, unspecified constipation type  She will start to use MiraLAX more routinely until she has a bowel movement, I will let her know the results of her x-ray as they become available  - XR Abdomen 1 View; Future  - UA Macroscopic with reflex to Microscopic and Culture - Lab Collect; Future  - UA Macroscopic with reflex to Microscopic and Culture - Lab Collect    This chart documentation was completed in part with Dragon voice recognition software.  Documentation is reviewed after completion, however, some words and grammatical errors may remain.  MICHAEL Balderas   Mary is a 34 year old, presenting for the following health issues:  Abdominal Pain        1/13/2025     1:07 PM   Additional Questions   Roomed by BT   Accompanied by self     History of Present Illness       Reason for visit:  Deep pain left of umbilicus. Severity is inconsistent.  Symptom onset:  3-7 days ago  Symptoms include:  Pain  Symptom intensity:  Moderate  Symptom progression:  Staying the same  Had these symptoms before:  No  What makes it worse:  Pressure on area or moving too much  What makes it better:  Rest   She is taking medications regularly.     On January 5 she noticed this discomfort it was left lateral abdominal pain.  She had had diarrhea for 2 days and then has really been constipated ever since.  " This is not unusual for her to be constipated but she took MiraLAX last week and it did not work.  She has been treating her constipation with probiotic and pills that generally clean her out after 1 dosage.  Last week she had stopped running on the treadmill because she noticed that this pain increased.  Now the pain can be on the right or the left side.  Last week she had nausea and reduced appetite but that has resolved.  This occurred after she had diarrhea and that resolved.  On the right side of her abdomen when she feels the pain here it does radiate into her back.  She has had no fevers chills or urinary symptoms.  She has had abdominoplasty and 4 C-sections but she has not had any other abdominal procedures        Review of Systems  Constitutional, HEENT, cardiovascular, pulmonary, gi and gu systems are negative, except as otherwise noted.      Objective    /70   Pulse 71   Temp 98.7  F (37.1  C) (Temporal)   Ht 1.6 m (5' 3\")   Wt 62.1 kg (137 lb)   SpO2 100%   BMI 24.27 kg/m    Body mass index is 24.27 kg/m .  Physical Exam   GENERAL: alert and no distress  NECK: no adenopathy, no asymmetry, masses, or scars  RESP: lungs clear to auscultation - no rales, rhonchi or wheezes  CV: regular rate and rhythm, normal S1 S2, no S3 or S4, no murmur, click or rub, no peripheral edema  ABDOMEN: soft, minimally tender in the left upper quadrant and just to the right of her umbilicus otherwise nontender, no hepatosplenomegaly, no masses and bowel sounds normal  MS: no gross musculoskeletal defects noted, no edema  NEURO: Normal strength and tone, mentation intact and speech normal  PSYCH: mentation appears normal, affect normal/bright    Results for orders placed or performed in visit on 01/13/25   CBC with platelets     Status: Normal   Result Value Ref Range    WBC Count 5.9 4.0 - 11.0 10e3/uL    RBC Count 4.26 3.80 - 5.20 10e6/uL    Hemoglobin 13.3 11.7 - 15.7 g/dL    Hematocrit 38.1 35.0 - 47.0 %    MCV " 89 78 - 100 fL    MCH 31.2 26.5 - 33.0 pg    MCHC 34.9 31.5 - 36.5 g/dL    RDW 12.1 10.0 - 15.0 %    Platelet Count 249 150 - 450 10e3/uL   UA Macroscopic with reflex to Microscopic and Culture - Lab Collect     Status: Abnormal    Specimen: Urine, Clean Catch   Result Value Ref Range    Color Urine Yellow Colorless, Straw, Light Yellow, Yellow    Appearance Urine Clear Clear    Glucose Urine Negative Negative mg/dL    Bilirubin Urine Negative Negative    Ketones Urine Negative Negative mg/dL    Specific Gravity Urine 1.015 1.003 - 1.035    Blood Urine Negative Negative    pH Urine 7.5 (H) 5.0 - 7.0    Protein Albumin Urine Negative Negative mg/dL    Urobilinogen Urine 0.2 0.2, 1.0 E.U./dL    Nitrite Urine Negative Negative    Leukocyte Esterase Urine Negative Negative    Narrative    Microscopic not indicated           Signed Electronically by: Cheryl Miranda PA-C

## 2025-01-19 ENCOUNTER — MYC MEDICAL ADVICE (OUTPATIENT)
Dept: FAMILY MEDICINE | Facility: CLINIC | Age: 35
End: 2025-01-19
Payer: COMMERCIAL

## 2025-01-19 DIAGNOSIS — R10.84 ABDOMINAL PAIN, GENERALIZED: Primary | ICD-10-CM

## 2025-01-22 ENCOUNTER — ANCILLARY PROCEDURE (OUTPATIENT)
Dept: ULTRASOUND IMAGING | Facility: OTHER | Age: 35
End: 2025-01-22
Attending: PHYSICIAN ASSISTANT
Payer: COMMERCIAL

## 2025-01-22 DIAGNOSIS — R10.84 ABDOMINAL PAIN, GENERALIZED: ICD-10-CM

## 2025-01-22 PROCEDURE — 76700 US EXAM ABDOM COMPLETE: CPT | Mod: TC | Performed by: RADIOLOGY

## 2025-02-20 DIAGNOSIS — J45.20 MILD INTERMITTENT ASTHMA WITHOUT COMPLICATION: Primary | ICD-10-CM

## 2025-02-20 RX ORDER — ALBUTEROL SULFATE 90 UG/1
2 INHALANT RESPIRATORY (INHALATION) EVERY 6 HOURS PRN
Qty: 18 G | Refills: 1 | Status: SHIPPED | OUTPATIENT
Start: 2025-02-20

## 2025-05-12 ENCOUNTER — OFFICE VISIT (OUTPATIENT)
Dept: OBGYN | Facility: OTHER | Age: 35
End: 2025-05-12
Payer: COMMERCIAL

## 2025-05-12 VITALS
DIASTOLIC BLOOD PRESSURE: 72 MMHG | WEIGHT: 138.2 LBS | OXYGEN SATURATION: 99 % | HEIGHT: 63 IN | HEART RATE: 72 BPM | BODY MASS INDEX: 24.49 KG/M2 | SYSTOLIC BLOOD PRESSURE: 114 MMHG

## 2025-05-12 DIAGNOSIS — Z98.51 S/P TUBAL LIGATION: ICD-10-CM

## 2025-05-12 DIAGNOSIS — Z98.890 S/P ENDOMETRIAL ABLATION: ICD-10-CM

## 2025-05-12 DIAGNOSIS — N85.8 UTERINE CYST: ICD-10-CM

## 2025-05-12 DIAGNOSIS — Z00.00 ANNUAL PHYSICAL EXAM: Primary | ICD-10-CM

## 2025-05-12 PROBLEM — N92.0 MENORRHAGIA WITH REGULAR CYCLE: Status: RESOLVED | Noted: 2021-04-23 | Resolved: 2025-05-12

## 2025-05-12 PROCEDURE — 3078F DIAST BP <80 MM HG: CPT | Performed by: OBSTETRICS & GYNECOLOGY

## 2025-05-12 PROCEDURE — 99395 PREV VISIT EST AGE 18-39: CPT | Performed by: OBSTETRICS & GYNECOLOGY

## 2025-05-12 PROCEDURE — 3074F SYST BP LT 130 MM HG: CPT | Performed by: OBSTETRICS & GYNECOLOGY

## 2025-05-12 PROCEDURE — 99459 PELVIC EXAMINATION: CPT | Performed by: OBSTETRICS & GYNECOLOGY

## 2025-05-12 NOTE — PROGRESS NOTES
Chief Complaint   Patient presents with    Physical       Subjective  Mary Torres is a 35 year old female who presents for her annual exam.  Patient has an endometrial ablation done in .  She will have occasional vaginal spotting but nothing more than that.  No pain.  No problems urinating.  Normal bowel movements. Patient is sexually active.  Some dyspareunia=deep penetration.  No vaginal spotting after.  4 c sections.  She has had a tubal ligation.      Most recent pap:    History of abnormal Pap smear:  No    Family history of uterine cancer:  No  Family history of ovarian cancer: No  Family history of colon cancer:  No  Family history of breast cancer:  No    ROS  ROS: 10 point ROS neg other than the symptoms noted above in the HPI.    Past Medical History:   Diagnosis Date    Asthma, intermittent      Past Surgical History:   Procedure Laterality Date     SECTION  2013    Failure to descend    DILATION AND CURETTAGE, HYSTEROSCOPY, ABLATE ENDOMETRIUM NOVASURE, COMBINED N/A 2021    Procedure: Hysteroscopy dilation and curettage, endometrial ablation with Novasure;  Surgeon: Jailyn Palacios DO;  Location: [facility] OR    DISSECT LYMPH NODE INGUINAL Right 2023    Procedure: INGUINAL LYMPH NODE BIOPSY RIGHT;  Surgeon: Tim Mckay MD;  Location: [facility] OR    ENDOMETRIAL ABLATION W/ NOVASURE N/A 2021    HD&C, EA with Novasure, Menometrorrhagia    ENT SURGERY      wisdom teeth     Family History   Problem Relation Age of Onset    Asthma Mother     Diabetes Father     Thyroid Disease Father     Cancer Paternal Grandmother         lung cancer    Asthma Maternal Grandmother     Asthma Sister     C.A.D. No family hx of     Hypertension No family hx of     Cerebrovascular Disease No family hx of     Breast Cancer No family hx of     Cancer - colorectal No family hx of     Prostate Cancer No family hx of     Alcohol/Drug No family hx of     Allergies No family hx of     Alzheimer  Nette Paniagua presents today for   Chief Complaint   Patient presents with    Side Pain       Is someone accompanying this pt? No    Is the patient using any DME equipment during OV? No    Depression Screening:  3 most recent PHQ Screens 8/31/2021   PHQ Not Done -   Little interest or pleasure in doing things Not at all   Feeling down, depressed, irritable, or hopeless Not at all   Total Score PHQ 2 0   Trouble falling or staying asleep, or sleeping too much -   Feeling tired or having little energy -   Poor appetite, weight loss, or overeating -   Feeling bad about yourself - or that you are a failure or have let yourself or your family down -   Trouble concentrating on things such as school, work, reading, or watching TV -   Moving or speaking so slowly that other people could have noticed; or the opposite being so fidgety that others notice -   Thoughts of being better off dead, or hurting yourself in some way -   PHQ 9 Score -   How difficult have these problems made it for you to do your work, take care of your home and get along with others -       Learning Assessment:  Learning Assessment 9/25/2020   PRIMARY LEARNER Patient   HIGHEST LEVEL OF EDUCATION - PRIMARY LEARNER  SOME COLLEGE   BARRIERS PRIMARY LEARNER NONE   CO-LEARNER CAREGIVER No   PRIMARY LANGUAGE ENGLISH   LEARNER PREFERENCE PRIMARY LISTENING   ANSWERED BY patient   RELATIONSHIP SELF       Fall Risk  Fall Risk Assessment, last 12 mths 3/8/2021   Able to walk? Yes   Fall in past 12 months? 0       Health Maintenance reviewed and discussed and ordered per Provider. Health Maintenance Due   Topic Date Due    Hepatitis C Screening  Never done    COVID-19 Vaccine (1) Never done    DTaP/Tdap/Td series (1 - Tdap) Never done    PAP AKA CERVICAL CYTOLOGY  Never done    Colorectal Cancer Screening Combo  Never done   . Coordination of Care:  1. Have you been to the ER, urgent care clinic since your last visit?  Hospitalized since your last "Disease No family hx of     Anesthesia Reaction No family hx of     Arthritis No family hx of     Blood Disease No family hx of     Cardiovascular No family hx of     Circulatory No family hx of     Congenital Anomalies No family hx of     Connective Tissue Disorder No family hx of     Depression No family hx of     Endocrine Disease No family hx of     Eye Disorder No family hx of     Genetic Disorder No family hx of     Gastrointestinal Disease No family hx of     Genitourinary Problems No family hx of     Gynecology No family hx of     Heart Disease No family hx of     Lipids No family hx of     Musculoskeletal Disorder No family hx of     Neurologic Disorder No family hx of     Obesity No family hx of     Osteoporosis No family hx of     Psychotic Disorder No family hx of     Respiratory No family hx of     Family History Negative No family hx of     Hearing Loss No family hx of     Skin Cancer No family hx of      Social History     Tobacco Use    Smoking status: Never    Smokeless tobacco: Never   Substance Use Topics    Alcohol use: Yes     Comment: occ- 1 x per week       Tobacco abuse:  No  Do you get at least three servings of calcium containing foods daily (dairy, green leafy vegetables, etc.)? yes   Outside of work or daily activities, how many days per week do you exercise for 30 minutes or longer? 3-4x per week  The patient does not drink >3 drinks per day nor >7 drinks per week.   Have you had an eye exam in the past two years? yes   Do you see a dentist twice per year? yes   Today's PHQ-2 Score:   Abuse: Current or Past(Physical, Sexual or Emotional)- No   Do you feel safe in your environment - Yes  Objective  Vitals: /72   Pulse 72   Ht 1.6 m (5' 3\")   Wt 62.7 kg (138 lb 3.2 oz)   SpO2 99%   BMI 24.48 kg/m    BMI= Body mass index is 24.48 kg/m .    General appearance=well developed, well-nourished female  Gait=normal  Psych=mood is stable, alert and oriented x3  HEENT=mucous membranes " visit? No    2. Have you seen or consulted any other health care providers outside of the [address] [place] [address] since your last visit? Include any pap smears or colon screening.  No moist  Skin=no rashes or lesions seen,normal turgor   Breast:  Benign exam.  No masses noted.  Non tender. No skin changes, retraction, or nipple discharge.  Axilla feel completely normal, no lymph node enlargement and non-tender.  Neck=overall appearance is normal  Heart=RRR, no murmurs, no swelling noted  Thyroid=normal, no masses, no TTP, no enlargement  Lungs=non-labored breathing, no use of accessory muscles, clear to ausculation bilaterally  Abd=soft, Nontender/nondistended, +bowel sounds x4, no masses, no signs of hernias, no evidence of hepatosplenomegaly  PELVIC:    External genitalia: normal without lesions or masses  Urethral meatus: no lesions or prolapse noted, normal size  Urethra: no masses, non tender  Bladder: non tender, no fullness  Vagina: normal mucosa and rugae, no discharge.  Cervix: normal without lesion, no cervical motion tenderness, healthy, nulliparous  Uterus: small, mobile, nontender.  Adnexa: non tender, without masses  Rectal: deferred  Ext=no clubbing or cyanosis, no swelling      Last lipid profile:  2023  Regular self breast exam:  Yes  Most recent mammogram:  N/A  History of abnormal mammogram:  N/A  Fit testing:  N/A  DEXA:  N/A    Pelvic ultrasound=5/3/2024:  FINDINGS:     UTERUS: 8.9 x 2.6 x 4.6 cm. Normal in size and position with no  masses.     ENDOMETRIUM: 3 mm. Normal smooth endometrium. There is a small cyst  abutting the anterior endometrium at the fundus measuring 7 x 5 x 8  mm, new since 4/23/2021.     RIGHT OVARY: 2.7 x 2.5 x 2.7 cm. Normal.     LEFT OVARY: Surgically absent.     No significant free fluid.                                                          IMPRESSION:  1.  A small simple cyst abutting the endometrium at the fundus.  Otherwise, normal uterus.  2.  Normal right ovary. Left oophorectomy.      Assessment/Plan  1.)  Annual/well woman exam  2.)  History of endometrial ablation in 2021  3.)  History of tubal ligation  4.)  Endometrial cyst=repeat  pelvic ultrasound       The following topics were discussed or recommended   Discussed seat belt, helmet and sunscreen use    Calcium/Vitamin D supplement=Recommended 1000 mg of calcium daily and 800 IU of vitamin D.          Jailyn Palacios DO

## 2025-05-23 ENCOUNTER — RESULTS FOLLOW-UP (OUTPATIENT)
Dept: OBGYN | Facility: OTHER | Age: 35
End: 2025-05-23

## (undated) DEVICE — CURETTE SUCTION PIPELLE ENDOMETRIAL 3.1MMX23.5CM  8200

## (undated) DEVICE — PACK MINOR SBA15MIFSE

## (undated) DEVICE — DRAPE GYN/UROLOGY FLUID POUCH TUR 29455

## (undated) DEVICE — DRSG TELFA 3X8" 1238

## (undated) DEVICE — CATH INTERMITTENT CLEAN-CATH FEMALE 14FR 6" VINYL LF 420614

## (undated) DEVICE — TUBING SYS AQUILEX BLUE INFLOW AQL-110 YLW OUTFLOW AQL-111

## (undated) DEVICE — GLOVE BIOGEL PI ULTRATOUCH G SZ 7.5 42175

## (undated) DEVICE — ESU ABLATION NOVASURE W/SURESOUND NS2007

## (undated) DEVICE — PAD PERI W/WINGS 1580A

## (undated) DEVICE — SPONGE KITTNER 31001010

## (undated) DEVICE — SUCTION MANIFOLD NEPTUNE 2 SYS 4 PORT 0702-020-000

## (undated) DEVICE — SUCTION CANISTER DOLPHIN 3000ML

## (undated) DEVICE — BLADE KNIFE SURG 10 371110

## (undated) DEVICE — SU PDS II 1 CT-1 MONOFIL Z347H

## (undated) DEVICE — PREP CHLORAPREP 26ML TINTED ORANGE  260815

## (undated) DEVICE — GLOVE PROTEXIS W/NEU-THERA 6.0  2D73TE60

## (undated) DEVICE — PACK MINOR PROCEDURE CUSTOM

## (undated) DEVICE — SU VICRYL 3-0 SH 27" UND J416H

## (undated) DEVICE — SU VICRYL 4-0 PS-2 27" UND J426H

## (undated) DEVICE — ADH SKIN CLOSURE PREMIERPRO EXOFIN 1.0ML 3470

## (undated) DEVICE — STOCKING SLEEVE COMPRESSION CALF MED

## (undated) DEVICE — SOL WATER IRRIG 1000ML BOTTLE 07139-09

## (undated) DEVICE — PREP SKIN SCRUB TRAY 4461A

## (undated) RX ORDER — FENTANYL CITRATE 50 UG/ML
INJECTION, SOLUTION INTRAMUSCULAR; INTRAVENOUS
Status: DISPENSED
Start: 2023-04-28

## (undated) RX ORDER — ONDANSETRON 2 MG/ML
INJECTION INTRAMUSCULAR; INTRAVENOUS
Status: DISPENSED
Start: 2023-04-28

## (undated) RX ORDER — DEXAMETHASONE SODIUM PHOSPHATE 10 MG/ML
INJECTION, SOLUTION INTRAMUSCULAR; INTRAVENOUS
Status: DISPENSED
Start: 2023-04-28

## (undated) RX ORDER — BUPIVACAINE HYDROCHLORIDE AND EPINEPHRINE 2.5; 5 MG/ML; UG/ML
INJECTION, SOLUTION EPIDURAL; INFILTRATION; INTRACAUDAL; PERINEURAL
Status: DISPENSED
Start: 2023-04-28

## (undated) RX ORDER — ACETAMINOPHEN 325 MG/1
TABLET ORAL
Status: DISPENSED
Start: 2021-08-19

## (undated) RX ORDER — PROPOFOL 10 MG/ML
INJECTION, EMULSION INTRAVENOUS
Status: DISPENSED
Start: 2023-04-28

## (undated) RX ORDER — LIDOCAINE HYDROCHLORIDE 10 MG/ML
INJECTION, SOLUTION EPIDURAL; INFILTRATION; INTRACAUDAL; PERINEURAL
Status: DISPENSED
Start: 2023-04-28

## (undated) RX ORDER — KETOROLAC TROMETHAMINE 30 MG/ML
INJECTION, SOLUTION INTRAMUSCULAR; INTRAVENOUS
Status: DISPENSED
Start: 2023-04-28

## (undated) RX ORDER — FENTANYL CITRATE 50 UG/ML
INJECTION, SOLUTION INTRAMUSCULAR; INTRAVENOUS
Status: DISPENSED
Start: 2021-08-19